# Patient Record
Sex: MALE | Race: WHITE | NOT HISPANIC OR LATINO | Employment: OTHER | URBAN - METROPOLITAN AREA
[De-identification: names, ages, dates, MRNs, and addresses within clinical notes are randomized per-mention and may not be internally consistent; named-entity substitution may affect disease eponyms.]

---

## 2023-10-30 DIAGNOSIS — I10 HYPERTENSION, UNSPECIFIED TYPE: ICD-10-CM

## 2023-10-30 DIAGNOSIS — I25.10 CORONARY ARTERY DISEASE INVOLVING NATIVE CORONARY ARTERY OF NATIVE HEART, UNSPECIFIED WHETHER ANGINA PRESENT: Primary | ICD-10-CM

## 2023-10-30 RX ORDER — METOPROLOL TARTRATE 25 MG/1
1 TABLET, FILM COATED ORAL 2 TIMES DAILY
COMMUNITY
Start: 2021-12-20 | End: 2023-10-30 | Stop reason: SDUPTHER

## 2023-10-30 RX ORDER — METOPROLOL TARTRATE 25 MG/1
25 TABLET, FILM COATED ORAL 2 TIMES DAILY
Qty: 180 TABLET | Refills: 1 | Status: SHIPPED | OUTPATIENT
Start: 2023-10-30 | End: 2024-04-19 | Stop reason: ALTCHOICE

## 2024-01-02 ENCOUNTER — APPOINTMENT (OUTPATIENT)
Dept: RADIOLOGY | Facility: CLINIC | Age: 69
End: 2024-01-02
Payer: MEDICARE

## 2024-01-15 ENCOUNTER — APPOINTMENT (OUTPATIENT)
Dept: RADIOLOGY | Facility: CLINIC | Age: 69
End: 2024-01-15
Payer: MEDICARE

## 2024-01-29 ENCOUNTER — APPOINTMENT (OUTPATIENT)
Dept: RADIOLOGY | Facility: CLINIC | Age: 69
End: 2024-01-29
Payer: MEDICARE

## 2024-02-16 ENCOUNTER — HOSPITAL ENCOUNTER (OUTPATIENT)
Dept: RADIOLOGY | Facility: CLINIC | Age: 69
End: 2024-02-16
Payer: MEDICARE

## 2024-02-24 RX ORDER — MELATON/THEAN/VAL/LEM/CHAM/LAV 10MG-200MG
1 TABLET,IMMED, EXTENDED RELEASE, BIPHASIC ORAL DAILY
COMMUNITY

## 2024-02-24 RX ORDER — ATORVASTATIN CALCIUM 40 MG/1
40 TABLET, FILM COATED ORAL
COMMUNITY
End: 2024-04-19 | Stop reason: WASHOUT

## 2024-02-24 RX ORDER — EZETIMIBE 10 MG/1
10 TABLET ORAL DAILY
COMMUNITY
Start: 2022-04-13

## 2024-02-24 RX ORDER — SEMAGLUTIDE 0.68 MG/ML
INJECTION, SOLUTION SUBCUTANEOUS
COMMUNITY
Start: 2023-05-18

## 2024-02-24 RX ORDER — MULTIVIT WITH MINERALS/HERBS
1 TABLET ORAL DAILY
COMMUNITY
Start: 2022-04-13

## 2024-02-24 RX ORDER — CETIRIZINE HYDROCHLORIDE 10 MG/1
10 TABLET ORAL DAILY
COMMUNITY

## 2024-02-24 RX ORDER — CLOPIDOGREL BISULFATE 75 MG/1
75 TABLET ORAL DAILY
COMMUNITY
End: 2024-03-13 | Stop reason: SDUPTHER

## 2024-02-24 RX ORDER — MULTIVIT-MIN/IRON/FOLIC ACID/K 18-600-40
CAPSULE ORAL
COMMUNITY

## 2024-02-24 RX ORDER — ISOSORBIDE MONONITRATE 30 MG/1
30 TABLET, EXTENDED RELEASE ORAL DAILY
COMMUNITY
End: 2024-03-13 | Stop reason: SDUPTHER

## 2024-02-24 RX ORDER — HUMAN INSULIN 100 [IU]/ML
INJECTION, SOLUTION SUBCUTANEOUS
COMMUNITY

## 2024-02-24 RX ORDER — NAPROXEN SODIUM 220 MG/1
1 TABLET, FILM COATED ORAL DAILY
COMMUNITY

## 2024-02-24 RX ORDER — UBIDECARENONE 75 MG
CAPSULE ORAL
COMMUNITY

## 2024-02-24 RX ORDER — INSULIN HUMAN 100 [IU]/ML
INJECTION, SUSPENSION SUBCUTANEOUS 2 TIMES DAILY
COMMUNITY
Start: 2019-06-27

## 2024-02-24 RX ORDER — SYRINGE-NEEDLE,INSULIN,0.5 ML 31 GX5/16"
SYRINGE, EMPTY DISPOSABLE MISCELLANEOUS
COMMUNITY
Start: 2023-10-30

## 2024-02-24 RX ORDER — MULTIVITAMIN
TABLET ORAL
COMMUNITY

## 2024-02-24 RX ORDER — BLOOD-GLUCOSE METER
EACH MISCELLANEOUS
COMMUNITY

## 2024-02-24 RX ORDER — QUINIDINE SULFATE 200 MG
TABLET ORAL
COMMUNITY

## 2024-02-24 RX ORDER — HUMAN INSULIN 100 [IU]/ML
INJECTION, SUSPENSION SUBCUTANEOUS
COMMUNITY

## 2024-02-24 RX ORDER — LOSARTAN POTASSIUM 50 MG/1
50 TABLET ORAL DAILY
COMMUNITY
Start: 2022-07-28

## 2024-02-28 ENCOUNTER — TELEPHONE (OUTPATIENT)
Dept: CARDIOLOGY | Facility: CLINIC | Age: 69
End: 2024-02-28
Payer: MEDICARE

## 2024-02-29 ENCOUNTER — APPOINTMENT (OUTPATIENT)
Dept: CARDIOLOGY | Facility: CLINIC | Age: 69
End: 2024-02-29
Payer: MEDICARE

## 2024-03-13 DIAGNOSIS — I25.10 CORONARY ARTERY DISEASE INVOLVING NATIVE CORONARY ARTERY OF NATIVE HEART, UNSPECIFIED WHETHER ANGINA PRESENT: Primary | ICD-10-CM

## 2024-03-13 RX ORDER — CLOPIDOGREL BISULFATE 75 MG/1
75 TABLET ORAL DAILY
Qty: 90 TABLET | Refills: 0 | Status: SHIPPED | OUTPATIENT
Start: 2024-03-13 | End: 2024-04-19 | Stop reason: SDUPTHER

## 2024-03-13 RX ORDER — ISOSORBIDE MONONITRATE 30 MG/1
30 TABLET, EXTENDED RELEASE ORAL DAILY
Qty: 90 TABLET | Refills: 0 | Status: SHIPPED | OUTPATIENT
Start: 2024-03-13 | End: 2024-04-19 | Stop reason: SDUPTHER

## 2024-03-13 NOTE — TELEPHONE ENCOUNTER
----- Message from Jose Alejandro Garcia sent at 3/13/2024 10:12 AM EDT -----  Regarding: Med refill  Patient would like isosorbide mononitrate ER (Imdur) 30 mg 24 hr tablet and clopidogrel (Plavix) 75 mg tablet to be sent to [facility] in [place]. He had an appt but was canceled and we got him rescheduled but he thinks these meds may not least until then. Thank you

## 2024-03-13 NOTE — TELEPHONE ENCOUNTER
Last Appointment: 2/28/23 with Dr Murdock  Next Appointment: 4/19/24 with Dr Murdock  Last Labs: Requested labs from PCP 11/2023  Last Refilled: 2/28/23 90 days 3 refills

## 2024-03-14 ENCOUNTER — APPOINTMENT (OUTPATIENT)
Dept: RADIOLOGY | Facility: HOSPITAL | Age: 69
End: 2024-03-14
Payer: MEDICARE

## 2024-03-14 ENCOUNTER — HOSPITAL ENCOUNTER (OUTPATIENT)
Dept: RADIOLOGY | Facility: HOSPITAL | Age: 69
Discharge: HOME | End: 2024-03-14
Payer: MEDICARE

## 2024-03-14 DIAGNOSIS — R54 AGE-RELATED PHYSICAL DEBILITY: ICD-10-CM

## 2024-03-14 PROCEDURE — 72125 CT NECK SPINE W/O DYE: CPT

## 2024-03-14 PROCEDURE — 72125 CT NECK SPINE W/O DYE: CPT | Performed by: STUDENT IN AN ORGANIZED HEALTH CARE EDUCATION/TRAINING PROGRAM

## 2024-03-24 PROBLEM — I25.10 CAD (CORONARY ARTERY DISEASE): Status: ACTIVE | Noted: 2024-03-24

## 2024-03-24 PROBLEM — R26.89 IMBALANCE: Status: ACTIVE | Noted: 2024-03-24

## 2024-03-24 PROBLEM — M79.89 PAIN AND SWELLING OF LOWER LEG, LEFT: Status: ACTIVE | Noted: 2024-03-24

## 2024-03-24 PROBLEM — R53.83 FATIGUE: Status: ACTIVE | Noted: 2024-03-24

## 2024-03-24 PROBLEM — R42 DIZZINESS: Status: ACTIVE | Noted: 2024-02-07

## 2024-03-24 PROBLEM — E78.5 HLD (HYPERLIPIDEMIA): Status: ACTIVE | Noted: 2024-03-24

## 2024-03-24 PROBLEM — I73.9 CLAUDICATION, INTERMITTENT (CMS-HCC): Status: ACTIVE | Noted: 2024-03-24

## 2024-03-24 PROBLEM — R79.89 ELEVATED SERUM CREATININE: Status: ACTIVE | Noted: 2024-03-24

## 2024-03-24 PROBLEM — G56.21 ULNAR NEUROPATHY AT ELBOW, RIGHT: Status: ACTIVE | Noted: 2024-03-24

## 2024-03-24 PROBLEM — N28.9 MILD RENAL INSUFFICIENCY: Status: ACTIVE | Noted: 2024-03-24

## 2024-03-24 PROBLEM — H93.13 BILATERAL TINNITUS: Status: ACTIVE | Noted: 2024-02-07

## 2024-03-24 PROBLEM — Z86.79 HISTORY OF HYPERTENSION: Status: ACTIVE | Noted: 2024-03-24

## 2024-03-24 PROBLEM — R29.898 RIGHT HAND WEAKNESS: Status: ACTIVE | Noted: 2024-03-24

## 2024-03-24 PROBLEM — Z86.39 HISTORY OF ELEVATED LIPIDS: Status: ACTIVE | Noted: 2024-03-24

## 2024-03-24 PROBLEM — G81.90 HEMIPLEGIA (MULTI): Status: ACTIVE | Noted: 2024-03-24

## 2024-03-24 PROBLEM — G47.33 OBSTRUCTIVE SLEEP APNEA SYNDROME: Status: ACTIVE | Noted: 2023-05-04

## 2024-03-24 PROBLEM — M47.812 SPONDYLOSIS OF CERVICAL SPINE: Status: ACTIVE | Noted: 2024-03-24

## 2024-03-24 PROBLEM — R42 CERVICAL VERTIGO: Status: ACTIVE | Noted: 2024-03-24

## 2024-03-24 PROBLEM — M47.22 OSTEOARTHRITIS OF SPINE WITH RADICULOPATHY, CERVICAL REGION: Status: ACTIVE | Noted: 2024-03-24

## 2024-03-24 PROBLEM — R94.39 ABNORMAL STRESS TEST: Status: ACTIVE | Noted: 2024-03-24

## 2024-03-24 PROBLEM — K14.0: Status: ACTIVE | Noted: 2024-02-07

## 2024-03-24 PROBLEM — M79.673 PAIN OF FOOT: Status: ACTIVE | Noted: 2024-03-24

## 2024-03-24 PROBLEM — G56.01 ACUTE CARPAL TUNNEL SYNDROME, RIGHT: Status: ACTIVE | Noted: 2024-03-24

## 2024-03-24 PROBLEM — J30.9 ALLERGIC RHINITIS: Status: ACTIVE | Noted: 2024-03-24

## 2024-03-24 PROBLEM — M25.562 LEFT KNEE PAIN: Status: ACTIVE | Noted: 2024-03-24

## 2024-03-24 PROBLEM — G81.94 LEFT HEMIPARESIS (MULTI): Status: ACTIVE | Noted: 2024-03-24

## 2024-03-24 PROBLEM — B35.4 TINEA CORPORIS: Status: ACTIVE | Noted: 2024-03-24

## 2024-03-24 PROBLEM — R20.2 RIGHT HAND PARESTHESIA: Status: ACTIVE | Noted: 2024-03-24

## 2024-03-24 PROBLEM — M79.662 PAIN AND SWELLING OF LOWER LEG, LEFT: Status: ACTIVE | Noted: 2024-03-24

## 2024-03-24 PROBLEM — R69 DISEASE SUSPECTED: Status: ACTIVE | Noted: 2024-03-24

## 2024-03-24 PROBLEM — R20.0 NUMBNESS: Status: ACTIVE | Noted: 2024-03-24

## 2024-03-24 PROBLEM — R05.3 CHRONIC COUGH: Status: ACTIVE | Noted: 2024-03-24

## 2024-03-24 PROBLEM — L85.3 DRY SKIN: Status: ACTIVE | Noted: 2024-03-24

## 2024-03-24 PROBLEM — Z20.822 CONTACT WITH AND (SUSPECTED) EXPOSURE TO COVID-19: Status: ACTIVE | Noted: 2022-05-20

## 2024-03-24 PROBLEM — E11.9 DIABETES MELLITUS (MULTI): Status: ACTIVE | Noted: 2024-03-24

## 2024-03-24 PROBLEM — E29.1 HYPOGONADISM IN MALE: Status: ACTIVE | Noted: 2024-03-24

## 2024-03-24 PROBLEM — R06.02 SHORTNESS OF BREATH: Status: ACTIVE | Noted: 2024-03-24

## 2024-03-24 PROBLEM — J44.9 MILD CHRONIC OBSTRUCTIVE PULMONARY DISEASE (MULTI): Status: ACTIVE | Noted: 2022-05-20

## 2024-03-24 PROBLEM — R94.31 ABNORMAL EKG: Status: ACTIVE | Noted: 2024-03-24

## 2024-03-24 PROBLEM — J44.9 COPD, MILD (MULTI): Status: ACTIVE | Noted: 2024-03-24

## 2024-03-24 PROBLEM — I63.9 STROKE (MULTI): Status: ACTIVE | Noted: 2024-03-24

## 2024-03-24 PROBLEM — J34.89 NASAL VALVE BLOCKAGE: Status: ACTIVE | Noted: 2024-03-24

## 2024-03-24 PROBLEM — G47.00 INSOMNIA: Status: ACTIVE | Noted: 2024-03-24

## 2024-03-24 PROBLEM — J01.90 ACUTE SINUSITIS: Status: ACTIVE | Noted: 2024-03-24

## 2024-03-24 PROBLEM — B35.1 ONYCHOMYCOSIS: Status: ACTIVE | Noted: 2024-03-24

## 2024-03-24 PROBLEM — E11.42 DIABETIC PERIPHERAL NEUROPATHY (MULTI): Status: ACTIVE | Noted: 2024-03-24

## 2024-03-24 PROBLEM — R79.89 LOW TESTOSTERONE IN MALE: Status: ACTIVE | Noted: 2024-03-24

## 2024-03-24 PROBLEM — E87.5 HYPERKALEMIA: Status: ACTIVE | Noted: 2024-03-24

## 2024-03-24 PROBLEM — I10 BENIGN ESSENTIAL HYPERTENSION: Status: ACTIVE | Noted: 2022-05-20

## 2024-03-24 RX ORDER — AMLODIPINE BESYLATE 5 MG/1
5 TABLET ORAL
COMMUNITY
Start: 2019-05-06 | End: 2024-04-19 | Stop reason: WASHOUT

## 2024-03-24 RX ORDER — CALCIUM CARBONATE 260MG(650)
TABLET,CHEWABLE ORAL
COMMUNITY

## 2024-03-24 RX ORDER — LISINOPRIL 20 MG/1
20 TABLET ORAL
COMMUNITY
Start: 2014-10-24 | End: 2024-04-19 | Stop reason: SINTOL

## 2024-03-24 RX ORDER — INSULIN LISPRO 100 [IU]/ML
INJECTION, SOLUTION INTRAVENOUS; SUBCUTANEOUS
COMMUNITY
Start: 2015-06-16

## 2024-03-24 RX ORDER — ZOLPIDEM TARTRATE 5 MG/1
TABLET ORAL
COMMUNITY
Start: 2023-05-04

## 2024-03-24 RX ORDER — TURMERIC ROOT EXTRACT 500 MG
TABLET ORAL
COMMUNITY

## 2024-03-24 RX ORDER — ACETAMINOPHEN 500 MG
TABLET ORAL
COMMUNITY

## 2024-03-24 RX ORDER — PSEUDOEPHEDRINE HCL 120 MG
TABLET, EXTENDED RELEASE ORAL
COMMUNITY

## 2024-03-24 RX ORDER — INSULIN GLARGINE 100 [IU]/ML
INJECTION, SOLUTION SUBCUTANEOUS
COMMUNITY
Start: 2017-10-09

## 2024-03-24 RX ORDER — BLOOD SUGAR DIAGNOSTIC, DISC
STRIP MISCELLANEOUS
COMMUNITY
Start: 2014-10-24

## 2024-03-24 RX ORDER — TESTOSTERONE CYPIONATE 1000 MG/10ML
INJECTION, SOLUTION INTRAMUSCULAR
COMMUNITY
Start: 2019-01-18

## 2024-03-24 RX ORDER — TIOTROPIUM BROMIDE AND OLODATEROL 3.124; 2.736 UG/1; UG/1
SPRAY, METERED RESPIRATORY (INHALATION)
COMMUNITY
Start: 2024-02-13

## 2024-03-24 RX ORDER — GUAIFENESIN 400 MG/1
400 TABLET ORAL
COMMUNITY
Start: 2019-05-09

## 2024-03-24 RX ORDER — NYSTATIN 100000 [USP'U]/ML
SUSPENSION ORAL
COMMUNITY

## 2024-04-19 ENCOUNTER — OFFICE VISIT (OUTPATIENT)
Dept: CARDIOLOGY | Facility: CLINIC | Age: 69
End: 2024-04-19
Payer: MEDICARE

## 2024-04-19 VITALS
HEART RATE: 59 BPM | SYSTOLIC BLOOD PRESSURE: 126 MMHG | HEIGHT: 72 IN | WEIGHT: 212 LBS | BODY MASS INDEX: 28.71 KG/M2 | DIASTOLIC BLOOD PRESSURE: 70 MMHG

## 2024-04-19 DIAGNOSIS — I25.10 CORONARY ARTERY DISEASE INVOLVING NATIVE CORONARY ARTERY OF NATIVE HEART, UNSPECIFIED WHETHER ANGINA PRESENT: ICD-10-CM

## 2024-04-19 DIAGNOSIS — R94.31 ABNORMAL EKG: Primary | ICD-10-CM

## 2024-04-19 PROCEDURE — 4010F ACE/ARB THERAPY RXD/TAKEN: CPT | Performed by: INTERNAL MEDICINE

## 2024-04-19 PROCEDURE — 1159F MED LIST DOCD IN RCRD: CPT | Performed by: INTERNAL MEDICINE

## 2024-04-19 PROCEDURE — 3078F DIAST BP <80 MM HG: CPT | Performed by: INTERNAL MEDICINE

## 2024-04-19 PROCEDURE — 93000 ELECTROCARDIOGRAM COMPLETE: CPT | Performed by: INTERNAL MEDICINE

## 2024-04-19 PROCEDURE — 1157F ADVNC CARE PLAN IN RCRD: CPT | Performed by: INTERNAL MEDICINE

## 2024-04-19 PROCEDURE — 3074F SYST BP LT 130 MM HG: CPT | Performed by: INTERNAL MEDICINE

## 2024-04-19 PROCEDURE — 99213 OFFICE O/P EST LOW 20 MIN: CPT | Performed by: INTERNAL MEDICINE

## 2024-04-19 PROCEDURE — 1036F TOBACCO NON-USER: CPT | Performed by: INTERNAL MEDICINE

## 2024-04-19 PROCEDURE — 1160F RVW MEDS BY RX/DR IN RCRD: CPT | Performed by: INTERNAL MEDICINE

## 2024-04-19 RX ORDER — CLOPIDOGREL BISULFATE 75 MG/1
75 TABLET ORAL DAILY
Qty: 90 TABLET | Refills: 3 | Status: SHIPPED | OUTPATIENT
Start: 2024-04-19 | End: 2025-04-19

## 2024-04-19 RX ORDER — ISOSORBIDE MONONITRATE 30 MG/1
30 TABLET, EXTENDED RELEASE ORAL DAILY
Qty: 90 TABLET | Refills: 3 | Status: SHIPPED | OUTPATIENT
Start: 2024-04-19 | End: 2025-04-19

## 2024-04-19 ASSESSMENT — ENCOUNTER SYMPTOMS
OCCASIONAL FEELINGS OF UNSTEADINESS: 1
LOSS OF SENSATION IN FEET: 0
DEPRESSION: 0

## 2024-04-19 NOTE — PROGRESS NOTES
Counseling:  The patient was counseled regarding diagnostic results, instructions for management, risk factor reductions, prognosis, patient and family education, impressions, risks and benefits of treatment options and importance of compliance with treatment.      Chief Complaint:   The patient presents today for annual followup of CAD.     History Of Present Illness:    Jose Olivia is a 68 year old male patient who presents today for annual followup of CAD. His PMH is significant for HTN, CAD, mild COPD, DM with diabetic peripheral neuropathy, hyperlipidemia, mild renal insufficiency and CVA 2018 (on aspirin and Plavix since that time). Over the past year, the patient states that he has done well from a cardiac standpoint. He denies any CP, chest discomfort or SOB. He reports persistent fatigue, which has improved since discontinuing his a.m. dose of metoprolol. Patient is bradycardic today at 59 bpm, and the patient states that his HR is consistently low. BP has been stable. EKG today shows NSR with no acute changes.      Last Recorded Vitals:  Vitals:    04/19/24 1506   BP: 126/70   BP Location: Left arm   Pulse: 59   Weight: 96.2 kg (212 lb)   Height: 1.829 m (6')       Past Surgical History:  He has a past surgical history that includes Prostate surgery (04/05/2016); Knee surgery (04/05/2016); Tonsillectomy (04/05/2016); MR angio neck wo IV contrast (5/18/2022); and MR angio head wo IV contrast (5/18/2022).      Social History:  He reports that he has quit smoking. His smoking use included cigarettes. He has never used smokeless tobacco. No history on file for alcohol use and drug use.    Family History:  No family history on file.     Allergies:  Metformin and Lactate    Outpatient Medications:  Current Outpatient Medications   Medication Instructions    amLODIPine (NORVASC) 5 mg, oral, Daily RT    ascorbic acid, vitamin C, 500 mg capsule oral    aspirin 81 mg chewable tablet 1 tablet, oral, Daily     "atorvastatin (LIPITOR) 40 mg, oral    blood sugar diagnostic, disc (Breeze 2 Test Strips) strip Dilma Breeze 2 Test DISK Quantity: 400 Refills: 0 Start : 24-Oct-2014 Active    cetirizine (ZYRTEC) 10 mg, oral, Daily    cholecalciferol (Vitamin D-3) 50 mcg (2,000 unit) capsule oral    chromium amino acid chelate 400 mcg tablet oral    clopidogrel (PLAVIX) 75 mg, oral, Daily    cyanocobalamin (Vitamin B-12) 500 mcg tablet oral    Easy Touch Insulin Syringe 0.5 mL 31 gauge x 5/16\" syringe     ezetimibe (ZETIA) 10 mg, oral, Daily    folic acid/vit B complex and C (B complex-vitamin C-folic acid) 400 mcg tablet extended release 1 tablet, oral, Daily    glycopyrrolate-formoteroL 9-4.8 mcg HFA aerosol inhaler inhalation, Every 12 hours    guaiFENesin (HUMIBID 3) 400 mg, oral    insulin glargine (Lantus) 100 unit/mL (3 mL) pen subcutaneous    insulin lispro (HumaLOG) 100 unit/mL injection subcutaneous    insulin NPH and regular human (HumuLIN 70/30 U-100 Insulin) 100 unit/mL (70-30) injection subcutaneous, 2 times daily    insulin NPH, Isophane, (NovoLIN N NPH U-100 Insulin) 100 unit/mL injection subcutaneous    insulin regular (NovoLIN R Regular U100 Insulin) 100 unit/mL injection injectable 3 times a day (before meals)Sliding scale coverage    isosorbide mononitrate ER (IMDUR) 30 mg, oral, Daily    KRILL OIL ORAL oral    lisinopril 20 mg, oral, Daily RT    losartan (COZAAR) 50 mg, oral, Daily    magnesium oxide,aspartate,citr (Triple Magnesium Complex) 400 mg magnesium capsule oral    metoprolol tartrate (LOPRESSOR) 25 mg, oral, 2 times daily    multivitamin tablet oral    mv-mn-iron-FA-herbal cmplx#190 (Vitamin D3 Complete) 18 mg iron-800 mcg-150 mg tablet oral    nystatin (Mycostatin) 100,000 unit/mL suspension     OneTouch Verio test strips strip use to take blood sugars at least 3-4 times per day    Ozempic 0.25 mg or 0.5 mg (2 mg/3 mL) pen injector     SACCHAROMYCES BOULARDII ORAL oral    SITagliptin phosphate " (Januvia) 100 mg tablet oral, Daily RT    Stiolto Respimat 2.5-2.5 mcg/actuation mist inhaler 2 puffs inhaled once a day    testosterone cypionate (Depo-Testosterone) 100 mg/mL injection intramuscular    turmeric root extract 500 mg tablet oral    vitamin B complex tablet 1 tablet, oral, Daily    zolpidem (Ambien) 5 mg tablet      Review of Systems   All other systems reviewed and are negative.     Physical Exam:  Constitutional:       Appearance: Healthy appearance. Not in distress.   Neck:      Vascular: No JVR. JVD normal.   Pulmonary:      Effort: Pulmonary effort is normal.      Breath sounds: Normal breath sounds. No wheezing. No rhonchi. No rales.   Chest:      Chest wall: Not tender to palpatation.   Cardiovascular:      PMI at left midclavicular line. Normal rate. Regular rhythm. Normal S1. Normal S2.       Murmurs: There is no murmur.      No gallop.  No click. No rub.   Pulses:     Intact distal pulses.   Edema:     Peripheral edema absent.   Abdominal:      General: Bowel sounds are normal.      Palpations: Abdomen is soft.      Tenderness: There is no abdominal tenderness.   Musculoskeletal: Normal range of motion.         General: No tenderness. Skin:     General: Skin is warm and dry.   Neurological:      General: No focal deficit present.      Mental Status: Alert and oriented to person, place and time.          Last Labs:  CBC -  Lab Results   Component Value Date    WBC 10.9 05/20/2022    HGB 15.6 05/20/2022    HCT 44.3 05/20/2022    MCV 95 05/20/2022     05/20/2022       CMP -  Lab Results   Component Value Date    CALCIUM 8.6 05/20/2022    PHOS 4.1 11/16/2019    PROT 6.4 05/18/2022    ALBUMIN 3.7 05/18/2022    AST 23 05/18/2022    ALT 29 05/18/2022    ALKPHOS 73 05/18/2022    BILITOT 0.5 05/18/2022       LIPID PANEL -   Lab Results   Component Value Date    CHOL 150 05/19/2022    TRIG 204 (H) 05/19/2022    HDL 40.1 05/19/2022    CHHDL 3.7 05/19/2022    LDLF 69 05/19/2022    VLDL 41 (H)  05/19/2022    NHDL 110 05/19/2022       RENAL FUNCTION PANEL -   Lab Results   Component Value Date    GLUCOSE 95 05/20/2022     05/20/2022    K 3.8 05/20/2022     (H) 05/20/2022    CO2 24 05/20/2022    ANIONGAP 10 05/20/2022    BUN 27 (H) 05/20/2022    CREATININE 1.28 05/20/2022    GFRMALE 61 05/20/2022    CALCIUM 8.6 05/20/2022    PHOS 4.1 11/16/2019    ALBUMIN 3.7 05/18/2022        Lab Results   Component Value Date     (H) 05/19/2022    HGBA1C 6.5 (A) 05/19/2022       Last Cardiology Tests:  05/20/2022 - TTE  1. The left ventricular systolic function is normal with a 55-60% estimated ejection fraction.  2. Spectral Doppler shows a pseudonormal pattern of left ventricular diastolic filling.  3. Aortic valve stenosis is not present.     12/23/2021 - Vascular Lab PVR ANGLE Only   1. Bilateral Lower PVR: No evidence of arterial occlusive disease bilaterally in the lower extremities at rest.  2. Right Lower PVR: Normal digital perfusion noted. Biphasic flow is noted in the right posterior tibial artery and right dorsalis pedis artery. Triphasic flow is noted in the right common femoral artery.  3. Left Lower PVR: Normal digital perfusion noted. Biphasic flow is noted in the left posterior tibial artery. Triphasic flow is noted in the left common femoral artery and left dorsalis pedis artery.     12/20/2021 - Cardiac Catheterization (LH)  1. Double vessel coronary artery disease without proximal left anterior descending involvement.  2. Left Ventricular end-diastolic pressure = 9.  3. Normal LV filling pressures.     11/23/2021 - Vascular Lab Carotid Artery Duplex U/S   1. Right Carotid: Findings are consistent with less than 50% stenosis of the right proximal ICA. Laminar flow seen by color Doppler. Right external carotid artery appears patent with no evidence of stenosis. No evidence of hemodynamically significant stenosis of the right common carotid artery. The right vertebral artery is patent with  antegrade flow. No evidence of hemodynamically significant stenosis in the right subclavian.  2. Left Carotid: Findings are consistent with less than 50% stenosis of the left proximal ICA. Laminar flow seen by color Doppler. Left external carotid artery appears patent with no evidence of stenosis. No evidence of hemodynamically significant stenosis of the left common carotid artery. The left vertebral artery is patent with antegrade flow. No evidence of hemodynamically significant stenosis in the left subclavian.     11/04/2021 - TTE  1. The left ventricular systolic function is normal with a 60-65% estimated ejection fraction.  2. There is moderate concentric left ventricular hypertrophy.     11/04/2021 - Exercise Stress Test  1. Stress test was indeterminate / nondiagnostic due to baseline EKG abnormalities.  2. Consideration for alternative testing (stress test with imaging component, CTA coronaries, or coronary angiography) can be considered if clinically indicated.  3. Dr. Treadwell's office was made aware of the result and recommendations as outlined above.  4. Hypertensive blood pressure response to exercise.  5. Indeterminate Stress Test.  6. The adequate level of stress was achieved.     06/10/2019 - PFTs  Minimal airway obstruction is present.     Lab review: I have personally reviewed the laboratory result(s).    Assessment/Plan   1) Moderate Diffuse CAD of RCA and LAD  On ASA 81 mg daily, Plavix 75 mg daily, Zetia 10 mg daily, Imdur 30 mg daily, metoprolol tartrate 25 mg BID  If recurrent symptoms on medical Rx, then PCI of RCA  Lipid panel 11/21/2023 with LDL of 43  Denies CP, chest discomfort or SOB  Reports persistent fatigue which has improved since discontinuing his a.m. dose of metoprolol  Patient has been consistently bradycardic   BP stable  Wheezing heard on exam today - per patient, recent CXR was negative, advised to f/u with PCP    Discontinue metoprolol   Home monitoring of BP  F/U 1 year      2) Claudication Symptoms  Negative LE ANGLE in the past     3) Fatigue/Tiredness, Decreased Energy, Poor Sleep Quality  Pt reports these symptoms have been present x10 years  Inconclusive in-lab sleep study in past d/t inability to fall asleep in lab setting  Previously referred to Dr. Castro for assessment of sleep apnea  Reports persistent fatigue which has improved since discontinuing his a.m. dose of metoprolol  Will discontinue metoprolol        Scribe Attestation  By signing my name below, I, Reena Macias   attest that this documentation has been prepared under the direction and in the presence of Diogo Murdock MD.

## 2024-04-19 NOTE — PATIENT INSTRUCTIONS
Discontinue metoprolol.  Continue all other medications as prescribed.   Continue to monitor your blood pressure at home, especially since we are discontinuing your metoprolol.   As Dr. Murdock heard some wheezing in your lungs today, he has recommended that you followup with your primary care provider to make sure that your recent chest x-ray was normal.   Followup with Dr. Murdock in 1 year, sooner should any issues or concerns arise before then.     If you have any questions or cardiac concerns, please call our office at [phone].

## 2024-07-07 ENCOUNTER — HOSPITAL ENCOUNTER (INPATIENT)
Facility: HOSPITAL | Age: 69
DRG: 397 | End: 2024-07-07
Attending: EMERGENCY MEDICINE | Admitting: INTERNAL MEDICINE
Payer: MEDICARE

## 2024-07-07 ENCOUNTER — ANESTHESIA EVENT (OUTPATIENT)
Dept: OPERATING ROOM | Facility: HOSPITAL | Age: 69
End: 2024-07-07
Payer: MEDICARE

## 2024-07-07 ENCOUNTER — APPOINTMENT (OUTPATIENT)
Dept: CARDIOLOGY | Facility: HOSPITAL | Age: 69
DRG: 397 | End: 2024-07-07
Payer: MEDICARE

## 2024-07-07 ENCOUNTER — APPOINTMENT (OUTPATIENT)
Dept: RADIOLOGY | Facility: HOSPITAL | Age: 69
DRG: 397 | End: 2024-07-07
Payer: MEDICARE

## 2024-07-07 ENCOUNTER — ANESTHESIA (OUTPATIENT)
Dept: OPERATING ROOM | Facility: HOSPITAL | Age: 69
End: 2024-07-07
Payer: MEDICARE

## 2024-07-07 VITALS
TEMPERATURE: 98.2 F | DIASTOLIC BLOOD PRESSURE: 86 MMHG | HEART RATE: 73 BPM | HEIGHT: 72 IN | WEIGHT: 210.54 LBS | BODY MASS INDEX: 28.52 KG/M2 | RESPIRATION RATE: 18 BRPM | SYSTOLIC BLOOD PRESSURE: 149 MMHG | OXYGEN SATURATION: 94 %

## 2024-07-07 DIAGNOSIS — I48.91 UNSPECIFIED ATRIAL FIBRILLATION (MULTI): ICD-10-CM

## 2024-07-07 DIAGNOSIS — K35.30 ACUTE APPENDICITIS WITH LOCALIZED PERITONITIS, WITHOUT PERFORATION, ABSCESS, OR GANGRENE: Primary | ICD-10-CM

## 2024-07-07 DIAGNOSIS — I48.91 ATRIAL FIBRILLATION, UNSPECIFIED TYPE (MULTI): ICD-10-CM

## 2024-07-07 DIAGNOSIS — R94.31 ABNORMAL EKG: ICD-10-CM

## 2024-07-07 DIAGNOSIS — R06.02 SHORTNESS OF BREATH: ICD-10-CM

## 2024-07-07 LAB
ABO GROUP (TYPE) IN BLOOD: NORMAL
ABO GROUP (TYPE) IN BLOOD: NORMAL
ALBUMIN SERPL BCP-MCNC: 3.8 G/DL (ref 3.4–5)
ALP SERPL-CCNC: 68 U/L (ref 33–136)
ALT SERPL W P-5'-P-CCNC: 19 U/L (ref 10–52)
ANION GAP SERPL CALC-SCNC: 12 MMOL/L (ref 10–20)
ANTIBODY SCREEN: NORMAL
AST SERPL W P-5'-P-CCNC: 14 U/L (ref 9–39)
BASOPHILS # BLD AUTO: 0.04 X10*3/UL (ref 0–0.1)
BASOPHILS NFR BLD AUTO: 0.2 %
BILIRUB SERPL-MCNC: 1.3 MG/DL (ref 0–1.2)
BLOOD EXPIRATION DATE: NORMAL
BLOOD EXPIRATION DATE: NORMAL
BUN SERPL-MCNC: 26 MG/DL (ref 6–23)
CALCIUM SERPL-MCNC: 9.4 MG/DL (ref 8.6–10.3)
CHLORIDE SERPL-SCNC: 107 MMOL/L (ref 98–107)
CO2 SERPL-SCNC: 23 MMOL/L (ref 21–32)
CREAT SERPL-MCNC: 1.67 MG/DL (ref 0.5–1.3)
DISPENSE STATUS: NORMAL
DISPENSE STATUS: NORMAL
EGFRCR SERPLBLD CKD-EPI 2021: 44 ML/MIN/1.73M*2
EOSINOPHIL # BLD AUTO: 0.01 X10*3/UL (ref 0–0.7)
EOSINOPHIL NFR BLD AUTO: 0 %
ERYTHROCYTE [DISTWIDTH] IN BLOOD BY AUTOMATED COUNT: 12.7 % (ref 11.5–14.5)
GLUCOSE BLD MANUAL STRIP-MCNC: 156 MG/DL (ref 74–99)
GLUCOSE BLD MANUAL STRIP-MCNC: 177 MG/DL (ref 74–99)
GLUCOSE BLD MANUAL STRIP-MCNC: 196 MG/DL (ref 74–99)
GLUCOSE BLD MANUAL STRIP-MCNC: 207 MG/DL (ref 74–99)
GLUCOSE SERPL-MCNC: 166 MG/DL (ref 74–99)
HCT VFR BLD AUTO: 46.4 % (ref 41–52)
HGB BLD-MCNC: 16.4 G/DL (ref 13.5–17.5)
IMM GRANULOCYTES # BLD AUTO: 0.13 X10*3/UL (ref 0–0.7)
IMM GRANULOCYTES NFR BLD AUTO: 0.6 % (ref 0–0.9)
LACTATE SERPL-SCNC: 1.6 MMOL/L (ref 0.4–2)
LIPASE SERPL-CCNC: 13 U/L (ref 9–82)
LYMPHOCYTES # BLD AUTO: 0.28 X10*3/UL (ref 1.2–4.8)
LYMPHOCYTES NFR BLD AUTO: 1.3 %
MAGNESIUM SERPL-MCNC: 1.5 MG/DL (ref 1.6–2.4)
MCH RBC QN AUTO: 34 PG (ref 26–34)
MCHC RBC AUTO-ENTMCNC: 35.3 G/DL (ref 32–36)
MCV RBC AUTO: 96 FL (ref 80–100)
MONOCYTES # BLD AUTO: 0.59 X10*3/UL (ref 0.1–1)
MONOCYTES NFR BLD AUTO: 2.8 %
NEUTROPHILS # BLD AUTO: 20.28 X10*3/UL (ref 1.2–7.7)
NEUTROPHILS NFR BLD AUTO: 95.1 %
NRBC BLD-RTO: 0 /100 WBCS (ref 0–0)
PLATELET # BLD AUTO: 219 X10*3/UL (ref 150–450)
POTASSIUM SERPL-SCNC: 4.1 MMOL/L (ref 3.5–5.3)
PRODUCT BLOOD TYPE: 2800
PRODUCT BLOOD TYPE: 2800
PRODUCT CODE: NORMAL
PRODUCT CODE: NORMAL
PROT SERPL-MCNC: 6.4 G/DL (ref 6.4–8.2)
RBC # BLD AUTO: 4.82 X10*6/UL (ref 4.5–5.9)
RH FACTOR (ANTIGEN D): NORMAL
RH FACTOR (ANTIGEN D): NORMAL
SODIUM SERPL-SCNC: 138 MMOL/L (ref 136–145)
UNIT ABO: NORMAL
UNIT ABO: NORMAL
UNIT NUMBER: NORMAL
UNIT NUMBER: NORMAL
UNIT RH: NORMAL
UNIT RH: NORMAL
UNIT VOLUME: 324
UNIT VOLUME: 325
WBC # BLD AUTO: 21.3 X10*3/UL (ref 4.4–11.3)

## 2024-07-07 PROCEDURE — 99285 EMERGENCY DEPT VISIT HI MDM: CPT | Mod: 25

## 2024-07-07 PROCEDURE — 83735 ASSAY OF MAGNESIUM: CPT | Performed by: EMERGENCY MEDICINE

## 2024-07-07 PROCEDURE — 2500000005 HC RX 250 GENERAL PHARMACY W/O HCPCS: Performed by: SURGERY

## 2024-07-07 PROCEDURE — 3600000004 HC OR TIME - INITIAL BASE CHARGE - PROCEDURE LEVEL FOUR: Performed by: SURGERY

## 2024-07-07 PROCEDURE — 2720000007 HC OR 272 NO HCPCS: Performed by: SURGERY

## 2024-07-07 PROCEDURE — 93005 ELECTROCARDIOGRAM TRACING: CPT

## 2024-07-07 PROCEDURE — 99222 1ST HOSP IP/OBS MODERATE 55: CPT | Performed by: INTERNAL MEDICINE

## 2024-07-07 PROCEDURE — 86901 BLOOD TYPING SEROLOGIC RH(D): CPT | Performed by: STUDENT IN AN ORGANIZED HEALTH CARE EDUCATION/TRAINING PROGRAM

## 2024-07-07 PROCEDURE — 2500000004 HC RX 250 GENERAL PHARMACY W/ HCPCS (ALT 636 FOR OP/ED): Performed by: INTERNAL MEDICINE

## 2024-07-07 PROCEDURE — 83690 ASSAY OF LIPASE: CPT | Performed by: EMERGENCY MEDICINE

## 2024-07-07 PROCEDURE — 85025 COMPLETE CBC W/AUTO DIFF WBC: CPT | Performed by: EMERGENCY MEDICINE

## 2024-07-07 PROCEDURE — 7100000001 HC RECOVERY ROOM TIME - INITIAL BASE CHARGE: Performed by: SURGERY

## 2024-07-07 PROCEDURE — 96375 TX/PRO/DX INJ NEW DRUG ADDON: CPT

## 2024-07-07 PROCEDURE — 2500000004 HC RX 250 GENERAL PHARMACY W/ HCPCS (ALT 636 FOR OP/ED): Performed by: SURGERY

## 2024-07-07 PROCEDURE — 2500000004 HC RX 250 GENERAL PHARMACY W/ HCPCS (ALT 636 FOR OP/ED): Performed by: STUDENT IN AN ORGANIZED HEALTH CARE EDUCATION/TRAINING PROGRAM

## 2024-07-07 PROCEDURE — 0752T DGTZ GLS MCRSCP SLD LVL III: CPT | Mod: TC,PORLAB | Performed by: EMERGENCY MEDICINE

## 2024-07-07 PROCEDURE — 2060000001 HC INTERMEDIATE ICU ROOM DAILY

## 2024-07-07 PROCEDURE — 7100000002 HC RECOVERY ROOM TIME - EACH INCREMENTAL 1 MINUTE: Performed by: SURGERY

## 2024-07-07 PROCEDURE — 36430 TRANSFUSION BLD/BLD COMPNT: CPT

## 2024-07-07 PROCEDURE — 0DTJ4ZZ RESECTION OF APPENDIX, PERCUTANEOUS ENDOSCOPIC APPROACH: ICD-10-PCS | Performed by: SURGERY

## 2024-07-07 PROCEDURE — 88304 TISSUE EXAM BY PATHOLOGIST: CPT | Performed by: PATHOLOGY

## 2024-07-07 PROCEDURE — P9035 PLATELET PHERES LEUKOREDUCED: HCPCS

## 2024-07-07 PROCEDURE — 83036 HEMOGLOBIN GLYCOSYLATED A1C: CPT | Performed by: INTERNAL MEDICINE

## 2024-07-07 PROCEDURE — 83605 ASSAY OF LACTIC ACID: CPT | Performed by: EMERGENCY MEDICINE

## 2024-07-07 PROCEDURE — 2780000003 HC OR 278 NO HCPCS: Performed by: SURGERY

## 2024-07-07 PROCEDURE — 74177 CT ABD & PELVIS W/CONTRAST: CPT

## 2024-07-07 PROCEDURE — 3600000009 HC OR TIME - EACH INCREMENTAL 1 MINUTE - PROCEDURE LEVEL FOUR: Performed by: SURGERY

## 2024-07-07 PROCEDURE — 84295 ASSAY OF SERUM SODIUM: CPT | Performed by: EMERGENCY MEDICINE

## 2024-07-07 PROCEDURE — 36415 COLL VENOUS BLD VENIPUNCTURE: CPT | Performed by: EMERGENCY MEDICINE

## 2024-07-07 PROCEDURE — 2500000004 HC RX 250 GENERAL PHARMACY W/ HCPCS (ALT 636 FOR OP/ED): Performed by: NURSE ANESTHETIST, CERTIFIED REGISTERED

## 2024-07-07 PROCEDURE — 0WQF4ZZ REPAIR ABDOMINAL WALL, PERCUTANEOUS ENDOSCOPIC APPROACH: ICD-10-PCS | Performed by: SURGERY

## 2024-07-07 PROCEDURE — 2500000004 HC RX 250 GENERAL PHARMACY W/ HCPCS (ALT 636 FOR OP/ED): Performed by: ANESTHESIOLOGY

## 2024-07-07 PROCEDURE — 2500000005 HC RX 250 GENERAL PHARMACY W/O HCPCS: Performed by: NURSE ANESTHETIST, CERTIFIED REGISTERED

## 2024-07-07 PROCEDURE — 3700000001 HC GENERAL ANESTHESIA TIME - INITIAL BASE CHARGE: Performed by: SURGERY

## 2024-07-07 PROCEDURE — 3700000002 HC GENERAL ANESTHESIA TIME - EACH INCREMENTAL 1 MINUTE: Performed by: SURGERY

## 2024-07-07 PROCEDURE — 87185 SC STD ENZYME DETCJ PER NZM: CPT | Mod: PORLAB | Performed by: SURGERY

## 2024-07-07 PROCEDURE — 36415 COLL VENOUS BLD VENIPUNCTURE: CPT | Performed by: STUDENT IN AN ORGANIZED HEALTH CARE EDUCATION/TRAINING PROGRAM

## 2024-07-07 PROCEDURE — 2500000004 HC RX 250 GENERAL PHARMACY W/ HCPCS (ALT 636 FOR OP/ED): Performed by: EMERGENCY MEDICINE

## 2024-07-07 PROCEDURE — 96365 THER/PROPH/DIAG IV INF INIT: CPT

## 2024-07-07 PROCEDURE — 82947 ASSAY GLUCOSE BLOOD QUANT: CPT

## 2024-07-07 PROCEDURE — 2550000001 HC RX 255 CONTRASTS: Performed by: EMERGENCY MEDICINE

## 2024-07-07 RX ORDER — ACETAMINOPHEN 325 MG/1
650 TABLET ORAL EVERY 4 HOURS PRN
Status: DISCONTINUED | OUTPATIENT
Start: 2024-07-07 | End: 2024-07-15 | Stop reason: HOSPADM

## 2024-07-07 RX ORDER — PROPOFOL 10 MG/ML
INJECTION, EMULSION INTRAVENOUS AS NEEDED
Status: DISCONTINUED | OUTPATIENT
Start: 2024-07-07 | End: 2024-07-07

## 2024-07-07 RX ORDER — INSULIN LISPRO 100 [IU]/ML
0-10 INJECTION, SOLUTION INTRAVENOUS; SUBCUTANEOUS
Status: DISCONTINUED | OUTPATIENT
Start: 2024-07-07 | End: 2024-07-15 | Stop reason: HOSPADM

## 2024-07-07 RX ORDER — CLOPIDOGREL BISULFATE 75 MG/1
75 TABLET ORAL DAILY
Status: DISCONTINUED | OUTPATIENT
Start: 2024-07-08 | End: 2024-07-15 | Stop reason: HOSPADM

## 2024-07-07 RX ORDER — AMOXICILLIN AND CLAVULANATE POTASSIUM 875; 125 MG/1; MG/1
1 TABLET, FILM COATED ORAL EVERY 12 HOURS
Qty: 20 TABLET | Refills: 0 | Status: SHIPPED | OUTPATIENT
Start: 2024-07-07 | End: 2024-07-25 | Stop reason: WASHOUT

## 2024-07-07 RX ORDER — LIDOCAINE HCL/PF 100 MG/5ML
SYRINGE (ML) INTRAVENOUS AS NEEDED
Status: DISCONTINUED | OUTPATIENT
Start: 2024-07-07 | End: 2024-07-07

## 2024-07-07 RX ORDER — ONDANSETRON HYDROCHLORIDE 2 MG/ML
4 INJECTION, SOLUTION INTRAVENOUS ONCE AS NEEDED
Status: DISCONTINUED | OUTPATIENT
Start: 2024-07-07 | End: 2024-07-07 | Stop reason: HOSPADM

## 2024-07-07 RX ORDER — SODIUM CHLORIDE, SODIUM LACTATE, POTASSIUM CHLORIDE, CALCIUM CHLORIDE 600; 310; 30; 20 MG/100ML; MG/100ML; MG/100ML; MG/100ML
100 INJECTION, SOLUTION INTRAVENOUS CONTINUOUS
Status: DISCONTINUED | OUTPATIENT
Start: 2024-07-07 | End: 2024-07-07 | Stop reason: HOSPADM

## 2024-07-07 RX ORDER — ISOSORBIDE MONONITRATE 30 MG/1
30 TABLET, EXTENDED RELEASE ORAL DAILY
Status: DISCONTINUED | OUTPATIENT
Start: 2024-07-07 | End: 2024-07-15 | Stop reason: HOSPADM

## 2024-07-07 RX ORDER — FENTANYL CITRATE 50 UG/ML
INJECTION, SOLUTION INTRAMUSCULAR; INTRAVENOUS AS NEEDED
Status: DISCONTINUED | OUTPATIENT
Start: 2024-07-07 | End: 2024-07-07

## 2024-07-07 RX ORDER — MAGNESIUM GLYCINATE 100 MG
255 CAPSULE ORAL DAILY
COMMUNITY
End: 2024-07-25 | Stop reason: WASHOUT

## 2024-07-07 RX ORDER — SODIUM CHLORIDE 0.9 G/100ML
IRRIGANT IRRIGATION AS NEEDED
Status: DISCONTINUED | OUTPATIENT
Start: 2024-07-07 | End: 2024-07-07 | Stop reason: HOSPADM

## 2024-07-07 RX ORDER — DIPHENHYDRAMINE HYDROCHLORIDE 50 MG/ML
12.5 INJECTION INTRAMUSCULAR; INTRAVENOUS ONCE AS NEEDED
Status: DISCONTINUED | OUTPATIENT
Start: 2024-07-07 | End: 2024-07-07 | Stop reason: HOSPADM

## 2024-07-07 RX ORDER — DROPERIDOL 2.5 MG/ML
0.62 INJECTION, SOLUTION INTRAMUSCULAR; INTRAVENOUS ONCE AS NEEDED
Status: DISCONTINUED | OUTPATIENT
Start: 2024-07-07 | End: 2024-07-07 | Stop reason: HOSPADM

## 2024-07-07 RX ORDER — METRONIDAZOLE 500 MG/1
500 TABLET ORAL 3 TIMES DAILY
Qty: 30 TABLET | Refills: 0 | Status: SHIPPED | OUTPATIENT
Start: 2024-07-07 | End: 2024-07-25 | Stop reason: WASHOUT

## 2024-07-07 RX ORDER — ROCURONIUM BROMIDE 10 MG/ML
INJECTION, SOLUTION INTRAVENOUS AS NEEDED
Status: DISCONTINUED | OUTPATIENT
Start: 2024-07-07 | End: 2024-07-07

## 2024-07-07 RX ORDER — MEPERIDINE HYDROCHLORIDE 25 MG/ML
12.5 INJECTION INTRAMUSCULAR; INTRAVENOUS; SUBCUTANEOUS EVERY 10 MIN PRN
Status: DISCONTINUED | OUTPATIENT
Start: 2024-07-07 | End: 2024-07-07 | Stop reason: HOSPADM

## 2024-07-07 RX ORDER — HYDROMORPHONE HYDROCHLORIDE 0.2 MG/ML
0.2 INJECTION INTRAMUSCULAR; INTRAVENOUS; SUBCUTANEOUS EVERY 5 MIN PRN
Status: DISCONTINUED | OUTPATIENT
Start: 2024-07-07 | End: 2024-07-07 | Stop reason: HOSPADM

## 2024-07-07 RX ORDER — DEXTROSE 50 % IN WATER (D50W) INTRAVENOUS SYRINGE
12.5
Status: DISCONTINUED | OUTPATIENT
Start: 2024-07-07 | End: 2024-07-15 | Stop reason: HOSPADM

## 2024-07-07 RX ORDER — ACETAMINOPHEN 10 MG/ML
1000 INJECTION, SOLUTION INTRAVENOUS EVERY 8 HOURS
Status: DISCONTINUED | OUTPATIENT
Start: 2024-07-07 | End: 2024-07-10

## 2024-07-07 RX ORDER — LABETALOL HYDROCHLORIDE 5 MG/ML
5 INJECTION, SOLUTION INTRAVENOUS ONCE AS NEEDED
Status: DISCONTINUED | OUTPATIENT
Start: 2024-07-07 | End: 2024-07-07 | Stop reason: HOSPADM

## 2024-07-07 RX ORDER — PHENYLEPHRINE HCL IN 0.9% NACL 1 MG/10 ML
SYRINGE (ML) INTRAVENOUS AS NEEDED
Status: DISCONTINUED | OUTPATIENT
Start: 2024-07-07 | End: 2024-07-07

## 2024-07-07 RX ORDER — OXYCODONE HYDROCHLORIDE 5 MG/1
5 TABLET ORAL EVERY 4 HOURS PRN
Status: DISCONTINUED | OUTPATIENT
Start: 2024-07-07 | End: 2024-07-07 | Stop reason: HOSPADM

## 2024-07-07 RX ORDER — ONDANSETRON HYDROCHLORIDE 2 MG/ML
4 INJECTION, SOLUTION INTRAVENOUS EVERY 6 HOURS PRN
Status: DISCONTINUED | OUTPATIENT
Start: 2024-07-07 | End: 2024-07-15 | Stop reason: HOSPADM

## 2024-07-07 RX ORDER — ONDANSETRON HYDROCHLORIDE 2 MG/ML
INJECTION, SOLUTION INTRAVENOUS AS NEEDED
Status: DISCONTINUED | OUTPATIENT
Start: 2024-07-07 | End: 2024-07-07

## 2024-07-07 RX ORDER — ALBUTEROL SULFATE 0.83 MG/ML
2.5 SOLUTION RESPIRATORY (INHALATION) ONCE AS NEEDED
Status: DISCONTINUED | OUTPATIENT
Start: 2024-07-07 | End: 2024-07-07 | Stop reason: HOSPADM

## 2024-07-07 RX ORDER — EZETIMIBE 10 MG/1
10 TABLET ORAL DAILY
Status: DISCONTINUED | OUTPATIENT
Start: 2024-07-07 | End: 2024-07-15 | Stop reason: HOSPADM

## 2024-07-07 RX ORDER — MAGNESIUM SULFATE HEPTAHYDRATE 40 MG/ML
2 INJECTION, SOLUTION INTRAVENOUS ONCE
Status: COMPLETED | OUTPATIENT
Start: 2024-07-07 | End: 2024-07-07

## 2024-07-07 RX ORDER — DEXTROSE 50 % IN WATER (D50W) INTRAVENOUS SYRINGE
25
Status: DISCONTINUED | OUTPATIENT
Start: 2024-07-07 | End: 2024-07-15 | Stop reason: HOSPADM

## 2024-07-07 RX ORDER — NAPROXEN SODIUM 220 MG/1
81 TABLET, FILM COATED ORAL DAILY
Status: DISCONTINUED | OUTPATIENT
Start: 2024-07-07 | End: 2024-07-15 | Stop reason: HOSPADM

## 2024-07-07 RX ORDER — LOSARTAN POTASSIUM 50 MG/1
50 TABLET ORAL DAILY
Status: DISCONTINUED | OUTPATIENT
Start: 2024-07-07 | End: 2024-07-12

## 2024-07-07 RX ORDER — MIDAZOLAM HYDROCHLORIDE 1 MG/ML
INJECTION, SOLUTION INTRAMUSCULAR; INTRAVENOUS AS NEEDED
Status: DISCONTINUED | OUTPATIENT
Start: 2024-07-07 | End: 2024-07-07

## 2024-07-07 RX ORDER — SODIUM CHLORIDE, SODIUM LACTATE, POTASSIUM CHLORIDE, CALCIUM CHLORIDE 600; 310; 30; 20 MG/100ML; MG/100ML; MG/100ML; MG/100ML
75 INJECTION, SOLUTION INTRAVENOUS CONTINUOUS
Status: DISCONTINUED | OUTPATIENT
Start: 2024-07-07 | End: 2024-07-09

## 2024-07-07 RX ORDER — MIDAZOLAM HYDROCHLORIDE 1 MG/ML
1 INJECTION, SOLUTION INTRAMUSCULAR; INTRAVENOUS ONCE AS NEEDED
Status: DISCONTINUED | OUTPATIENT
Start: 2024-07-07 | End: 2024-07-07 | Stop reason: HOSPADM

## 2024-07-07 RX ADMIN — ACETAMINOPHEN 1000 MG: 10 INJECTION INTRAVENOUS at 21:41

## 2024-07-07 RX ADMIN — PIPERACILLIN SODIUM AND TAZOBACTAM SODIUM 4.5 G: 4; .5 INJECTION, SOLUTION INTRAVENOUS at 04:33

## 2024-07-07 RX ADMIN — MAGNESIUM SULFATE HEPTAHYDRATE 2 G: 40 INJECTION, SOLUTION INTRAVENOUS at 06:23

## 2024-07-07 RX ADMIN — SUGAMMADEX 200 MG: 100 INJECTION, SOLUTION INTRAVENOUS at 13:47

## 2024-07-07 RX ADMIN — IOHEXOL 75 ML: 350 INJECTION, SOLUTION INTRAVENOUS at 03:42

## 2024-07-07 RX ADMIN — LIDOCAINE HYDROCHLORIDE 100 MG: 20 INJECTION, SOLUTION INTRAVENOUS at 12:33

## 2024-07-07 RX ADMIN — PIPERACILLIN SODIUM AND TAZOBACTAM SODIUM 3.38 G: 3; .375 INJECTION, SOLUTION INTRAVENOUS at 17:06

## 2024-07-07 RX ADMIN — MIDAZOLAM 2 MG: 1 INJECTION INTRAMUSCULAR; INTRAVENOUS at 12:24

## 2024-07-07 RX ADMIN — ACETAMINOPHEN 1000 MG: 10 INJECTION INTRAVENOUS at 06:43

## 2024-07-07 RX ADMIN — DEXAMETHASONE SODIUM PHOSPHATE 4 MG: 4 INJECTION, SOLUTION INTRAMUSCULAR; INTRAVENOUS at 12:36

## 2024-07-07 RX ADMIN — FENTANYL CITRATE 100 MCG: 50 INJECTION INTRAMUSCULAR; INTRAVENOUS at 12:33

## 2024-07-07 RX ADMIN — SODIUM CHLORIDE, POTASSIUM CHLORIDE, SODIUM LACTATE AND CALCIUM CHLORIDE 75 ML/HR: 600; 310; 30; 20 INJECTION, SOLUTION INTRAVENOUS at 06:20

## 2024-07-07 RX ADMIN — PIPERACILLIN SODIUM AND TAZOBACTAM SODIUM 3.38 G: 3; .375 INJECTION, SOLUTION INTRAVENOUS at 22:15

## 2024-07-07 RX ADMIN — Medication 200 MCG: at 12:55

## 2024-07-07 RX ADMIN — HYDROMORPHONE HYDROCHLORIDE 0.2 MG: 0.2 INJECTION, SOLUTION INTRAMUSCULAR; INTRAVENOUS; SUBCUTANEOUS at 14:57

## 2024-07-07 RX ADMIN — HYDROMORPHONE HYDROCHLORIDE 0.5 MG: 0.5 INJECTION, SOLUTION INTRAMUSCULAR; INTRAVENOUS; SUBCUTANEOUS at 06:23

## 2024-07-07 RX ADMIN — SODIUM CHLORIDE, POTASSIUM CHLORIDE, SODIUM LACTATE AND CALCIUM CHLORIDE 1000 ML: 600; 310; 30; 20 INJECTION, SOLUTION INTRAVENOUS at 04:11

## 2024-07-07 RX ADMIN — ROCURONIUM BROMIDE 50 MG: 10 INJECTION, SOLUTION INTRAVENOUS at 12:33

## 2024-07-07 RX ADMIN — PIPERACILLIN SODIUM AND TAZOBACTAM SODIUM 3.38 G: 3; .375 INJECTION, SOLUTION INTRAVENOUS at 11:08

## 2024-07-07 RX ADMIN — ONDANSETRON 4 MG: 2 INJECTION INTRAMUSCULAR; INTRAVENOUS at 12:36

## 2024-07-07 RX ADMIN — ONDANSETRON 4 MG: 2 INJECTION INTRAMUSCULAR; INTRAVENOUS at 13:40

## 2024-07-07 RX ADMIN — Medication 100 MCG: at 12:51

## 2024-07-07 RX ADMIN — PROPOFOL 200 MG: 10 INJECTION, EMULSION INTRAVENOUS at 12:33

## 2024-07-07 RX ADMIN — HYDROMORPHONE HYDROCHLORIDE 0.2 MG: 0.2 INJECTION, SOLUTION INTRAMUSCULAR; INTRAVENOUS; SUBCUTANEOUS at 14:35

## 2024-07-07 SDOH — SOCIAL STABILITY: SOCIAL INSECURITY: ARE YOU OR HAVE YOU BEEN THREATENED OR ABUSED PHYSICALLY, EMOTIONALLY, OR SEXUALLY BY ANYONE?: NO

## 2024-07-07 SDOH — SOCIAL STABILITY: SOCIAL INSECURITY: WERE YOU ABLE TO COMPLETE ALL THE BEHAVIORAL HEALTH SCREENINGS?: YES

## 2024-07-07 SDOH — SOCIAL STABILITY: SOCIAL INSECURITY: HAVE YOU HAD THOUGHTS OF HARMING ANYONE ELSE?: NO

## 2024-07-07 SDOH — SOCIAL STABILITY: SOCIAL INSECURITY: HAVE YOU HAD ANY THOUGHTS OF HARMING ANYONE ELSE?: NO

## 2024-07-07 SDOH — SOCIAL STABILITY: SOCIAL INSECURITY: DO YOU FEEL UNSAFE GOING BACK TO THE PLACE WHERE YOU ARE LIVING?: NO

## 2024-07-07 SDOH — SOCIAL STABILITY: SOCIAL INSECURITY: HAS ANYONE EVER THREATENED TO HURT YOUR FAMILY OR YOUR PETS?: NO

## 2024-07-07 SDOH — SOCIAL STABILITY: SOCIAL INSECURITY: ARE THERE ANY APPARENT SIGNS OF INJURIES/BEHAVIORS THAT COULD BE RELATED TO ABUSE/NEGLECT?: NO

## 2024-07-07 SDOH — HEALTH STABILITY: MENTAL HEALTH: CURRENT SMOKER: 0

## 2024-07-07 SDOH — SOCIAL STABILITY: SOCIAL INSECURITY: DO YOU FEEL ANYONE HAS EXPLOITED OR TAKEN ADVANTAGE OF YOU FINANCIALLY OR OF YOUR PERSONAL PROPERTY?: NO

## 2024-07-07 SDOH — SOCIAL STABILITY: SOCIAL INSECURITY: DOES ANYONE TRY TO KEEP YOU FROM HAVING/CONTACTING OTHER FRIENDS OR DOING THINGS OUTSIDE YOUR HOME?: NO

## 2024-07-07 ASSESSMENT — ENCOUNTER SYMPTOMS
EYE DISCHARGE: 0
APNEA: 0
DIFFICULTY URINATING: 0
EYES NEGATIVE: 1
VOMITING: 1
PSYCHIATRIC NEGATIVE: 1
APPETITE CHANGE: 1
CARDIOVASCULAR NEGATIVE: 1
ACTIVITY CHANGE: 1
CHILLS: 0
CONSTITUTIONAL NEGATIVE: 1
ALLERGIC/IMMUNOLOGIC NEGATIVE: 1
ENDOCRINE NEGATIVE: 1
WEAKNESS: 1
MUSCULOSKELETAL NEGATIVE: 1
ARTHRALGIAS: 0
ABDOMINAL PAIN: 1
FATIGUE: 0
COLOR CHANGE: 0
RESPIRATORY NEGATIVE: 1
NAUSEA: 1
ABDOMINAL DISTENTION: 1
AGITATION: 0
DIZZINESS: 0

## 2024-07-07 ASSESSMENT — LIFESTYLE VARIABLES
HAVE YOU OR SOMEONE ELSE BEEN INJURED AS A RESULT OF YOUR DRINKING: NO
HOW MANY STANDARD DRINKS CONTAINING ALCOHOL DO YOU HAVE ON A TYPICAL DAY: 3 OR 4
HOW OFTEN DO YOU HAVE A DRINK CONTAINING ALCOHOL: 2-3 TIMES A WEEK
HOW OFTEN DURING THE LAST YEAR HAVE YOU HAD A FEELING OF GUILT OR REMORSE AFTER DRINKING: NEVER
HOW OFTEN DURING THE LAST YEAR HAVE YOU FAILED TO DO WHAT WAS NORMALLY EXPECTED FROM YOU BECAUSE OF DRINKING: NEVER
SKIP TO QUESTIONS 9-10: 0
HOW OFTEN DURING THE LAST YEAR HAVE YOU NEEDED AN ALCOHOLIC DRINK FIRST THING IN THE MORNING TO GET YOURSELF GOING AFTER A NIGHT OF HEAVY DRINKING: NEVER
AUDIT-C TOTAL SCORE: 6
HOW OFTEN DO YOU HAVE 6 OR MORE DRINKS ON ONE OCCASION: MONTHLY
AUDIT TOTAL SCORE: 6
HOW OFTEN DURING THE LAST YEAR HAVE YOU BEEN UNABLE TO REMEMBER WHAT HAPPENED THE NIGHT BEFORE BECAUSE YOU HAD BEEN DRINKING: NEVER
HOW OFTEN DURING THE LAST YEAR HAVE YOU FOUND THAT YOU WERE NOT ABLE TO STOP DRINKING ONCE YOU HAD STARTED: NEVER
AUDIT-C TOTAL SCORE: 6
HAS A RELATIVE, FRIEND, DOCTOR, OR ANOTHER HEALTH PROFESSIONAL EXPRESSED CONCERN ABOUT YOUR DRINKING OR SUGGESTED YOU CUT DOWN: NO
AUDIT TOTAL SCORE: 0

## 2024-07-07 ASSESSMENT — PAIN DESCRIPTION - DESCRIPTORS: DESCRIPTORS: ACHING

## 2024-07-07 ASSESSMENT — COGNITIVE AND FUNCTIONAL STATUS - GENERAL
MOBILITY SCORE: 24
DAILY ACTIVITIY SCORE: 24
PATIENT BASELINE BEDBOUND: NO

## 2024-07-07 ASSESSMENT — PAIN DESCRIPTION - LOCATION
LOCATION: ABDOMEN

## 2024-07-07 ASSESSMENT — ACTIVITIES OF DAILY LIVING (ADL)
BATHING: INDEPENDENT
ASSISTIVE_DEVICE: EYEGLASSES
HEARING - LEFT EAR: FUNCTIONAL
PATIENT'S MEMORY ADEQUATE TO SAFELY COMPLETE DAILY ACTIVITIES?: YES
LACK_OF_TRANSPORTATION: NO
HEARING - RIGHT EAR: FUNCTIONAL
DRESSING YOURSELF: INDEPENDENT
ADEQUATE_TO_COMPLETE_ADL: YES
TOILETING: INDEPENDENT
JUDGMENT_ADEQUATE_SAFELY_COMPLETE_DAILY_ACTIVITIES: YES
FEEDING YOURSELF: INDEPENDENT
WALKS IN HOME: INDEPENDENT
GROOMING: INDEPENDENT

## 2024-07-07 ASSESSMENT — PAIN DESCRIPTION - ORIENTATION
ORIENTATION: RIGHT;LEFT;LOWER
ORIENTATION: RIGHT;LOWER
ORIENTATION: RIGHT;LEFT;LOWER

## 2024-07-07 ASSESSMENT — COLUMBIA-SUICIDE SEVERITY RATING SCALE - C-SSRS
1. IN THE PAST MONTH, HAVE YOU WISHED YOU WERE DEAD OR WISHED YOU COULD GO TO SLEEP AND NOT WAKE UP?: NO
2. HAVE YOU ACTUALLY HAD ANY THOUGHTS OF KILLING YOURSELF?: NO
6. HAVE YOU EVER DONE ANYTHING, STARTED TO DO ANYTHING, OR PREPARED TO DO ANYTHING TO END YOUR LIFE?: NO

## 2024-07-07 ASSESSMENT — PAIN SCALES - GENERAL
PAINLEVEL_OUTOF10: 5 - MODERATE PAIN
PAIN_LEVEL: 3
PAINLEVEL_OUTOF10: 5 - MODERATE PAIN
PAINLEVEL_OUTOF10: 5 - MODERATE PAIN
PAINLEVEL_OUTOF10: 4
PAINLEVEL_OUTOF10: 3
PAINLEVEL_OUTOF10: 4
PAINLEVEL_OUTOF10: 0 - NO PAIN
PAINLEVEL_OUTOF10: 4
PAINLEVEL_OUTOF10: 3
PAINLEVEL_OUTOF10: 4

## 2024-07-07 ASSESSMENT — PAIN DESCRIPTION - PROGRESSION: CLINICAL_PROGRESSION: GRADUALLY IMPROVING

## 2024-07-07 ASSESSMENT — PAIN - FUNCTIONAL ASSESSMENT
PAIN_FUNCTIONAL_ASSESSMENT: 0-10

## 2024-07-07 ASSESSMENT — PATIENT HEALTH QUESTIONNAIRE - PHQ9
2. FEELING DOWN, DEPRESSED OR HOPELESS: NOT AT ALL
SUM OF ALL RESPONSES TO PHQ9 QUESTIONS 1 & 2: 0
1. LITTLE INTEREST OR PLEASURE IN DOING THINGS: NOT AT ALL

## 2024-07-07 NOTE — SIGNIFICANT EVENT
After the consideration patient now amenable for surgical intervention.  Given dual antiplatelet use will order platelet transfusion prior to operating time and given medical history will have patient evaluated by IMS team prior to the operating room.  Patient in agreement with plan going forward.    Patient will be discussed with Attending Physician Dr. Jazlyn Chan PGY4  General Surgery Service

## 2024-07-07 NOTE — ANESTHESIA PREPROCEDURE EVALUATION
Patient: Jose Olivia    Procedure Information       Anesthesia Start Date/Time: 07/07/24 1224    Procedure: Appendectomy Laparoscopy    Location: [facility] / [facility]    Surgeons: Gilles Hobson MD            Relevant Problems   Anesthesia (within normal limits)      Cardiac   (+) Abnormal EKG   (+) Benign essential hypertension   (+) CAD (coronary artery disease)   (+) HLD (hyperlipidemia)      Pulmonary   (+) COPD, mild (Multi)   (+) Mild chronic obstructive pulmonary disease (Multi)   (+) Obstructive sleep apnea syndrome      Neuro   (+) Acute carpal tunnel syndrome, right   (+) Diabetic peripheral neuropathy (Multi)   (+) Hemiplegia (Multi)   (+) Stroke (Multi)   (+) Ulnar neuropathy at elbow, right      Endocrine   (+) Diabetic peripheral neuropathy (Multi)      Musculoskeletal   (+) Acute carpal tunnel syndrome, right   (+) Osteoarthritis of spine with radiculopathy, cervical region   (+) Spondylosis of cervical spine      HEENT   (+) Acute sinusitis      ID   (+) Onychomycosis   (+) Tinea corporis       Clinical information reviewed:   Tobacco  Allergies  Meds   Med Hx  Surg Hx   Fam Hx  Soc Hx        NPO Detail:  No data recorded     Physical Exam    Airway  Mallampati: III  TM distance: >3 FB  Neck ROM: full     Cardiovascular   Rhythm: regular  Rate: normal     Dental        Pulmonary - normal exam     Abdominal      Other findings: Missing front upper tooth. Otherfront upper tooth chipped. Multiple chipped teeth          Anesthesia Plan    History of general anesthesia?: yes  History of complications of general anesthesia?: no    ASA 3     general     The patient is not a current smoker.  Patient was not previously instructed to abstain from smoking on day of procedure.  Patient did not smoke on day of procedure.    intravenous induction   Postoperative administration of opioids is intended.  Trial extubation is planned.  Anesthetic plan and risks discussed with patient.  Use of blood products  discussed with patient who consented to blood products.    Plan discussed with CRNA.

## 2024-07-07 NOTE — OP NOTE
Appendectomy Laparoscopy WITH UMBILICAL HERNIA REPAIR. Operative Note     Date: 2024  OR Location: [facility]    Name: Jose Olivia, : 1955, Age: 68 y.o., MRN: [id number], Sex: male    Diagnosis  Pre-op Diagnosis     * Acute appendicitis with localized peritonitis, without perforation, abscess, or gangrene [K35.30] Perforated appendicitis     Procedures  Appendectomy Laparoscopy WITH UMBILICAL HERNIA REPAIR.  16084 - HI LAPAROSCOPIC APPENDECTOMY    umbilical hernia reducible, 2cm  Surgeons      * Gilles Hobson - Primary    Resident/Fellow/Other Assistant:  Surgeons and Role:  * No surgeons found with a matching role *  Ye Palafox  Procedure Summary  Anesthesia: General  ASA: III  Anesthesia Staff: CRNA: GABRIEL Nguyen-CRNA  Estimated Blood Loss: 2mL  Intra-op Medications:   Administrations occurring from 1200 to 1340 on 24:   Medication Name Total Dose   sodium chloride 0.9 % irrigation solution 200 mL   acetaminophen (Ofirmev) injection 1,000 mg Cannot be calculated   aspirin chewable tablet 81 mg Cannot be calculated   ezetimibe (Zetia) tablet 10 mg Cannot be calculated   HYDROmorphone (Dilaudid) injection 0.2 mg Cannot be calculated   isosorbide mononitrate ER (Imdur) 24 hr tablet 30 mg Cannot be calculated   losartan (Cozaar) tablet 50 mg Cannot be calculated   ondansetron (Zofran) injection 4 mg Cannot be calculated   piperacillin-tazobactam-dextrose (Zosyn) IV 3.375 g Cannot be calculated              Anesthesia Record               Intraprocedure I/O Totals          Intake    Dexmedetomidine 0.00 mL    The total shown is the total volume documented since Anesthesia Start was filed.    lactated Ringer's infusion 1200.00 mL    Total Intake 1200 mL       Output    Urine 750 mL    Est. Blood Loss 2 mL    Total Output 752 mL       Net    Net Volume 448 mL          Specimen:   ID Type Source Tests Collected by Time   1 : APPENDIX Tissue APPENDIX SURGICAL PATHOLOGY EXAM Gilles Hobson MD  7/7/2024 [id number]        Staff:   Circulator: Cecilia Soares Person: Frida         Drains and/or Catheters:   NG/OG/Feeding Tube (Active)       Urethral Catheter Non-latex 16 Fr. (Active)       Tourniquet Times:         Implants:     Findings:       Indications: Jose Olivia is an 68 y.o. male who is having surgery for Acute appendicitis with localized peritonitis, without perforation, abscess, or gangrene [K35.30].       The patient was seen in the preoperative area. The risks, benefits, complications, treatment options, non-operative alternatives, expected recovery and outcomes were discussed with the patient. The possibilities of reaction to medication, pulmonary aspiration, injury to surrounding structures, bleeding, recurrent infection, the need for additional procedures, failure to diagnose a condition, and creating a complication requiring transfusion or operation were discussed with the patient. The patient concurred with the proposed plan, giving informed consent.  The site of surgery was properly noted/marked if necessary per policy. The patient has been actively warmed in preoperative area. Preoperative antibiotics have been ordered and given within scheduled hours of incision. Venous thrombosis prophylaxis have been ordered including bilateral sequential compression devices    Procedure Details: Patient presented with signs and symptoms of appendicitis.  Imaging laboratory report was supportive of this diagnosis.  His imaging did not suggest perforation.  He was seen and transfused platelets due to his dual platelet antagonist and was promptly brought to the operating room.  He was found to have a reducible umbilical hernia on physical exam.    Patient was brought to the operating room placed in the supine position placed under general anesthesia.  Orogastric Mcintosh catheter PAS stockings were placed.  Abdomen was prepped and draped in usual sterile fashion.  A left periumbilical incision was made carried  down to the fascia and the umbilical hernia sac was encircled for 360 degrees at the fascial level sac was entered sac contained herniated preperitoneal fat which was reducible.  The preperitoneal fat was reduced Aurora clamp was used to enter the peritoneal cavity bluntly Kocher clamps were placed bilaterally and stay sutures of 0 Vicryl were placed and Florence trocar was placed.  Abdomen was insufflated to a pressure of 15 with CO2 gas.  Suprapubic and left lower quadrant abdominal ports were made with 5 mm ports.  Patient was placed headdown and rolled to the left.  Initial view showed a small amount of brownish turbid fluid in the right lower quadrant surrounding an appendix that appeared perforated with some stool staining on the local peritoneum.  Fluid was suctioned up for approximately 5 cc this was cultured.  Right lower quadrant was gently irrigated and suctioned for clear return the appendix was elevated in the anatomic position he had a fair amount of edema of the mesoappendix and the proximal aspect medially and he was found to have what appeared to be a perforation in the mid body of the appendix proximal appendix looked good.  The dissection initially started with scissors at the appendical cecal junction laterally as this had a good view and we are able to take down the peritoneum giving us a good view of the mesoappendix.  Using sequential Maryland clips and then scissors the mesoappendix at the appendical cecal junction was taken down from lateral to medial to provide us with a good view of the appendical cecal junction.  Once this was easily visualizable a tissue stapler was placed and fired across the appendical cecal junction resulting in a excellent appearing staple line the appendix then being placed into a bag.  The right lower quadrant was irrigated and suctioned for clear return there was minimal oozing during the case and at the end there was no oozing at all.  Pelvis was viewed as was the  right upper quadrant there did not appear to be any extra fluid the right lower quadrant was irrigated again for clear return and 5 mm ports were removed the umbilical hernia was closed at the fascial level with interrupted figure-of-eight 0 Prolene sutures.  Umbilical dermis was tacked down to the fascia using 2-0 Vicryl.  Skin was closed with subcuticular Vicryl.  Patient tolerated the procedure well and will be removed to the recovery room in satisfactory condition.  Complications:  None; patient tolerated the procedure well.    Disposition: PACU - hemodynamically stable.  Condition: stable         Additional Details:       Attending Attestation: I performed the procedure.    Gilles Hobson  Phone Number: [phone]

## 2024-07-07 NOTE — ED PROVIDER NOTES
Jose Olivia is a 68 y.o. patient presenting to the ED for abdominal pain.  The patient states that his pain began yesterday morning.  It was initially located in the center upper part of his abdomen.  It has slowly become lower and located more to the right.  Is associated with nausea, vomiting.  He has had a couple of episodes of softer stool, but denies watery, bloody, or mucus containing stool.  He has had chills and sweats intermittently throughout the day.  He denies any known fever.  He denies similar pain previously.    Additional History Obtained from: None  Limitations to History: None  ------------------------------------------------------------------------------------------------------------------------------------------  Physical Exam:  Appearance: Alert, cooperative,   Skin: Warm, dry, appropriate color for ethnicity.  Eyes: Cornea clear. No scleral icterus or injection.   ENT: Mucous membranes moist.  Pulmonary: No accessory muscle use or stridor. Clear lung sounds bilaterally without rhonchi or wheezing.   Cardiac: Heart sounds regular without murmur. B/L radial pulses full and symmetric.   Abdomen: Soft, tenderness in the right lower quadrant with guarding in the right lower quadrant.  Musculoskeletal: No gross deformities.   Neurological: Face symmetrical. Voice clear. Appropriately conversant.   Psychiatric: Appropriate mood and affect.    Medical Decision Making:  Chronic Medical Conditions Significantly Affecting Care:  has a past medical history of Diabetes mellitus (Multi), Other conditions influencing health status, Personal history of other diseases of the circulatory system (03/08/2017), Personal history of other diseases of the respiratory system (03/08/2017), and Personal history of other endocrine, nutritional and metabolic disease (03/08/2017).    Social Determinants of Health Significantly Affecting Care: None identified    Differential Diagnosis Considered but not limited to:  Appendicitis, diverticulitis, less likely cholecystitis, pyelonephritis.  Gastroenteritis is also a consideration.      External Records Reviewed:   I reviewed recent and relevant outside records including:     Independent Interpretation of Studies: The following studies were ordered as part of the emergency department work up and independently interpreted by me. See ED Course for details.    CBC with leukocytosis to 21.3.  The patient is hemodynamically stable without significant tachycardia or tachypnea.  Lactate is normal at 1.6.  CMP with mild acute on chronic renal dysfunction.  Current creatinine 1.6 with baseline of 1.2.  Remainder of CMP is unremarkable.    CT scan does show evidence of acute appendicitis without perforation or abscess.  The patient was given Zosyn.  Additionally he is receiving IV fluids.    EKG performed at 0106 and interpreted by me shows sinus rhythm at a rate of 94.  Intervals are normal.  There is left axis deviation.  There are no significant ST elevations or depressions.  No T wave changes.  No STEMI.    I discussed the case with the on-call surgical resident.  He did evaluate the patient and recommended appendectomy.  The patient initially declined this saying he had things to do at home.  After completing initial dose of IV antibiotics the patient's pain is returning.  After further discussion with him he is agreeable to staying for definitive management of his appendicitis.  I did discuss this with the surgical resident.  He does request that I involve medicine as the patient is on dual antiplatelet therapy and has cardiac history.  This was discussed with Dr. Lee, hospitalist.  He will admit the patient to stepdown with surgery consultation.    Diagnoses as of 07/07/24 0603   Acute appendicitis with localized peritonitis, without perforation, abscess, or gangrene          Mandy Platt,   07/07/24 0615       Mandy Platt, DO  07/07/24 0741

## 2024-07-07 NOTE — CONSULTS
[facility]  GENERAL SURGERY/TRAUMA SURGERY - HISTORY AND PHYSICAL / CONSULT    Patient Name: Jose Olivia  MRN: [id number]  Admit Date: 707  : 1955  AGE: 68 y.o.   GENDER: male    CHIEF COMPLAINT/REASON FOR CONSULT:  RLQ pain    Patient is a 68-year-old male who presented with 48 hours of abdominal pain.  Patient states the pain started Friday night and persisted prompting patient to come in the emergency room.  Patient denies any fevers, chills, shortness of breath.  States that the pain initially started in the epigastrium encounter worked its way periumbilical and now went into the right lower quadrant.  Patient had CT scan concerning for acute appendicitis as well as leukocytosis of 21,000. Patient denies any history of colonoscopy, egd or abdominal surgeries, but has had prostate surgery in the past.     PAST MEDICAL HISTORY:   Past Medical History:   Diagnosis Date    Diabetes mellitus (Multi)     Other conditions influencing health status     Type 2 diabetes mellitus with diabetic retinopathy and without macular edema, unspecified retinopathy severity    Personal history of other diseases of the circulatory system 2017    History of hypertension    Personal history of other diseases of the respiratory system 2017    History of chronic obstructive lung disease    Personal history of other endocrine, nutritional and metabolic disease 2017    History of hyperlipidemia     Past Surgical History:   Procedure Laterality Date    KNEE SURGERY  2016    Knee Surgery Right    MR HEAD ANGIO WO IV CONTRAST  2022    MR HEAD ANGIO WO IV CONTRAST 2022 [facility]ICAL LEGAC[facility]    MR NECK ANGIO WO IV CONTRAST  2022    MR NECK ANGIO WO IV CONTRAST 2022 [facility] CLINICAL LEGACY    PROSTATE SURGERY  2016    Prostate Surgery    TONSILLECTOMY  2016    Tonsillectomy       No family history on file.  SOCIAL HISTORY:    Social History  "    Tobacco Use   Smoking Status Former    Types: Cigarettes   Smokeless Tobacco Never     Social History     Substance and Sexual Activity   Alcohol Use None     Prior to Admission medications    Medication Sig Start Date End Date Taking? Authorizing Provider   ascorbic acid, vitamin C, 500 mg capsule Take by mouth.    Historical Provider, MD   aspirin 81 mg chewable tablet Chew 1 tablet (81 mg) once daily.    Historical Provider, MD   blood sugar diagnostic, disc (Breeze 2 Test Strips) strip Dilma Breeze 2 Test DISK Quantity: 400 Refills: 0 Start : 24-Oct-2014 Active 10/24/14   Historical Provider, MD   cetirizine (ZyrTEC) 10 mg tablet Take 1 tablet (10 mg) by mouth once daily.    Historical Provider, MD   cholecalciferol (Vitamin D-3) 50 mcg (2,000 unit) capsule Take by mouth.    Historical Provider, MD   chromium amino acid chelate 400 mcg tablet Take by mouth.    Historical Provider, MD   clopidogrel (Plavix) 75 mg tablet Take 1 tablet (75 mg) by mouth once daily. 4/19/24 4/19/25  Diogo Murdock MD   cyanocobalamin (Vitamin B-12) 500 mcg tablet Take by mouth.    Historical Provider, MD   Easy Touch Insulin Syringe 0.5 mL 31 gauge x 5/16\" syringe  10/30/23   Historical Provider, MD   ezetimibe (Zetia) 10 mg tablet Take 1 tablet (10 mg) by mouth once daily. 4/13/22   Historical Provider, MD   folic acid/vit B complex and C (B complex-vitamin C-folic acid) 400 mcg tablet extended release Take 1 tablet by mouth once daily.    Historical Provider, MD   glycopyrrolate-formoteroL 9-4.8 mcg HFA aerosol inhaler Inhale every 12 hours. 6/24/19   Historical Provider, MD   guaiFENesin (Humibid 3) 400 mg tablet Take 1 tablet (400 mg) by mouth. 5/9/19   Historical Provider, MD   insulin glargine (Lantus) 100 unit/mL (3 mL) pen Inject under the skin. 10/9/17   Historical Provider, MD   insulin lispro (HumaLOG) 100 unit/mL injection Inject under the skin. 6/16/15   Historical Provider, MD   insulin NPH and regular human (HumuLIN " 70/30 U-100 Insulin) 100 unit/mL (70-30) injection Inject under the skin twice a day. 6/27/19   Historical Provider, MD   insulin NPH, Isophane, (NovoLIN N NPH U-100 Insulin) 100 unit/mL injection Inject under the skin.    Historical Provider, MD   insulin regular (NovoLIN R Regular U100 Insulin) 100 unit/mL injection injectable 3 times a day (before meals)Sliding scale coverage    Historical Provider, MD   isosorbide mononitrate ER (Imdur) 30 mg 24 hr tablet Take 1 tablet (30 mg) by mouth once daily. 4/19/24 4/19/25  Diogo Murdock MD   KRILL OIL ORAL Take by mouth.    Historical Provider, MD   losartan (Cozaar) 50 mg tablet Take 1 tablet (50 mg) by mouth once daily. 7/28/22   Historical Provider, MD   magnesium oxide,aspartate,citr (Triple Magnesium Complex) 400 mg magnesium capsule Take by mouth.    Historical Provider, MD   multivitamin tablet Take by mouth.    Historical Provider, MD   mv-mn-iron-FA-herbal cmplx#190 (Vitamin D3 Complete) 18 mg iron-800 mcg-150 mg tablet Take by mouth.    Historical Provider, MD   nystatin (Mycostatin) 100,000 unit/mL suspension     Historical Provider, MD   OneTouch Verio test strips strip use to take blood sugars at least 3-4 times per day    Historical Provider, MD   Ozempic 0.25 mg or 0.5 mg (2 mg/3 mL) pen injector  5/18/23   Historical Provider, MD BELLA WILLIAM ORAL Take by mouth.    Historical Provider, MD   SITagliptin phosphate (Januvia) 100 mg tablet Take by mouth once daily. 1/18/19   Historical Provider, MD   Stiolto Respimat 2.5-2.5 mcg/actuation mist inhaler 2 puffs inhaled once a day 2/13/24   Historical Provider, MD   testosterone cypionate (Depo-Testosterone) 100 mg/mL injection Inject into the muscle. 1/18/19   Historical Provider, MD   turmeric root extract 500 mg tablet Take by mouth.    Historical Provider, MD   vitamin B complex tablet Take 1 tablet by mouth once daily. 4/13/22   Historical Provider, MD   zolpidem (Ambien) 5 mg tablet  5/4/23    Historical Provider, MD       Allergies   Allergen Reactions    Metformin Diarrhea    Lactate Rash       REVIEW OF SYSTEMS:  Review of Systems   Constitutional:  Positive for activity change and appetite change. Negative for chills and fatigue.   HENT:  Negative for congestion.    Eyes:  Negative for discharge.   Respiratory:  Negative for apnea.    Cardiovascular:  Negative for chest pain.   Gastrointestinal:  Positive for abdominal distention and abdominal pain.   Endocrine: Negative for cold intolerance.   Genitourinary:  Negative for difficulty urinating.   Musculoskeletal:  Negative for arthralgias.   Skin:  Negative for color change.   Allergic/Immunologic: Negative for environmental allergies.   Neurological:  Negative for dizziness.   Psychiatric/Behavioral:  Negative for agitation.      PHYSICAL EXAM:  Physical Exam  Constitutional:       Appearance: Normal appearance.   HENT:      Head: Normocephalic.      Right Ear: Tympanic membrane normal.      Nose: Nose normal.      Mouth/Throat:      Mouth: Mucous membranes are moist.   Eyes:      Pupils: Pupils are equal, round, and reactive to light.   Cardiovascular:      Pulses: Normal pulses.   Pulmonary:      Effort: Pulmonary effort is normal.   Abdominal:      General: There is distension.      Tenderness: There is abdominal tenderness. There is no guarding.   Musculoskeletal:         General: Normal range of motion.      Cervical back: Normal range of motion.   Skin:     General: Skin is warm.      Capillary Refill: Capillary refill takes less than 2 seconds.   Neurological:      General: No focal deficit present.      Mental Status: He is alert.           LABS:  Results from last 7 days   Lab Units 07/07/24  0156   WBC AUTO x10*3/uL 21.3*   HEMOGLOBIN g/dL 16.4   HEMATOCRIT % 46.4   PLATELETS AUTO x10*3/uL 219   NEUTROS PCT AUTO % 95.1   LYMPHS PCT AUTO % 1.3   MONOS PCT AUTO % 2.8   EOS PCT AUTO % 0.0         Results from last 7 days   Lab Units  07/07/24  0156   SODIUM mmol/L 138   POTASSIUM mmol/L 4.1   CHLORIDE mmol/L 107   CO2 mmol/L 23   BUN mg/dL 26*   CREATININE mg/dL 1.67*   CALCIUM mg/dL 9.4   PROTEIN TOTAL g/dL 6.4   BILIRUBIN TOTAL mg/dL 1.3*   ALK PHOS U/L 68   ALT U/L 19   AST U/L 14   GLUCOSE mg/dL 166*     Results from last 7 days   Lab Units 07/07/24  0156   BILIRUBIN TOTAL mg/dL 1.3*             I have reviewed all laboratory and imaging results ordered/pertinent for this encounter.      ==============================================================================  ASSESSMENT/PLAN:  Patient is a 68-year-old male past medical history of coronary artery disease, hypertension, drug who presented with abdominal pain and findings concerning for acute appendicitis.  Surgical management recommended for patient however given issues related to water heater patient insisting needs to take care of matters prior to any surgical intervention.  Risks of perforation, sepsis, death discussed with patient and patient decided to go AGAINST MEDICAL ADVICE.  Patient will be discharged home with oral antibiotics with a follow-up appointment with Dr. Hobson tomorrow to help schedule surgery.    Plan:  -Patient leaving AMA  -follow up with Dr. Hobson as an outpatient 07/08  -Oral Abx, Augmentin, Flagyl    Patient will be discussed with Attending Physician Dr. Jazlyn Chan PGY3  General Surgery Service   ==============================================================================

## 2024-07-07 NOTE — CARE PLAN
The patient's goals for the shift include      The clinical goals for the shift include Tolerate appendectomy

## 2024-07-07 NOTE — ED TRIAGE NOTES
Pt states he started with mild abdominal pain on Friday. Pt woke on Saturday with more intense pain. Pt states he vomited several times throughout the day and had multiple BM's. Pt states he had the chills and was shaky all day.

## 2024-07-07 NOTE — H&P
History Of Present Illness  Jose Olivia is a 68 y.o. male with past medical history of HTN, CAD, mild COPD, DM with diabetic peripheral neuropathy, hyperlipidemia, mild renal insufficiency and CVA 2018 (on aspirin and Plavix since that time) comes to the hospital with abdominal pain.  The patient states that his pain began yesterday morning while he was working on his water heater.  According the patient the pain moved from the middle of the abdomen more to the right.  He reported some nausea and vomiting.  He also reported some diarrhea.  He reported some chills and sweats but denies any chest pain or shortness of breath.  Initially the patient wanted to leave AMA to finish his water heater work but then later decided to stay for surgical intervention.  At this time the patient states his abdominal pain is improved and he is awaiting to go to the OR for surgery later today.     Past Medical History  He has a past medical history of Diabetes mellitus (Multi), Other conditions influencing health status, Personal history of other diseases of the circulatory system (03/08/2017), Personal history of other diseases of the respiratory system (03/08/2017), and Personal history of other endocrine, nutritional and metabolic disease (03/08/2017).    Surgical History  He has a past surgical history that includes Prostate surgery (04/05/2016); Knee surgery (04/05/2016); Tonsillectomy (04/05/2016); MR angio neck wo IV contrast (5/18/2022); and MR angio head wo IV contrast (5/18/2022).     Social History  He reports that he has quit smoking. His smoking use included cigarettes. He has never used smokeless tobacco. No history on file for alcohol use and drug use.    Family History  No family history on file.     Allergies  Metformin    Review of Systems   Constitutional: Negative.    HENT: Negative.     Eyes: Negative.    Respiratory: Negative.     Cardiovascular: Negative.    Gastrointestinal:  Positive for abdominal pain,  nausea and vomiting.   Endocrine: Negative.    Musculoskeletal: Negative.    Skin: Negative.    Allergic/Immunologic: Negative.    Neurological:  Positive for weakness.   Psychiatric/Behavioral: Negative.     All other systems reviewed and are negative.       Physical Exam  Constitutional:       Appearance: Normal appearance.   HENT:      Head: Normocephalic and atraumatic.      Nose: Nose normal.      Mouth/Throat:      Mouth: Mucous membranes are dry.   Eyes:      Extraocular Movements: Extraocular movements intact.      Conjunctiva/sclera: Conjunctivae normal.   Cardiovascular:      Rate and Rhythm: Normal rate and regular rhythm.      Pulses: Normal pulses.      Heart sounds: Normal heart sounds.   Pulmonary:      Effort: Pulmonary effort is normal.      Breath sounds: Normal breath sounds.   Abdominal:      General: Bowel sounds are normal.      Palpations: Abdomen is soft.      Tenderness: There is abdominal tenderness.   Musculoskeletal:         General: Normal range of motion.      Cervical back: Normal range of motion and neck supple.   Skin:     General: Skin is warm and dry.   Neurological:      General: No focal deficit present.      Mental Status: He is alert and oriented to person, place, and time. Mental status is at baseline.   Psychiatric:         Mood and Affect: Mood normal.         Behavior: Behavior normal.         Thought Content: Thought content normal.         Judgment: Judgment normal.          Last Recorded Vitals  /60   Pulse 61   Temp 36.4 °C (97.6 °F) (Temporal)   Resp 16   Wt 95.5 kg (210 lb 8.6 oz)   SpO2 95%     Relevant Results        Jose Olivia is a 68 y.o. male with past medical history of HTN, CAD, mild COPD, DM with diabetic peripheral neuropathy, hyperlipidemia, mild renal insufficiency and CVA 2018 (on aspirin and Plavix since that time) comes to the hospital with abdominal pain.  Admitted with acute appendicitis.     Assessment/Plan   Principal Problem:    Acute  appendicitis with localized peritonitis, without perforation, abscess, or gangrene      Acute appendicitis  -Finding on CAT scan concerning for acute appendicitis.  Surgery has been consulted plan 4 OR today.  -Leukocytosis secondary appendicitis.  Continue Zosyn.    HELLEN with underlying CKD  -Creatinine 1.67 on admission.  Continue IV fluid hydration  -Avoid nephrotoxins check renal functions in AM.    Diabetes mellitus  -Continue sliding scale insulin last A1c is from 2022 shows A1c of 6.5  -Recheck A1c    Coronary artery disease  -Continue with aspirin and Plavix postoperatively once okay with surgery.    Hypertension  -Holding losartan in setting of HELLEN resume once renal functions improved.    COPD  -Currently stable               Jori Blanco MD

## 2024-07-07 NOTE — ANESTHESIA PROCEDURE NOTES
Airway  Date/Time: 7/7/2024 12:33 PM  Urgency: elective      Staffing  Performed: CRNA   Authorized by: FLAKITO Nguyen    Performed by: FLAKITO Ngyuen  Patient location during procedure: OR    Indications and Patient Condition  Indications for airway management: anesthesia  Spontaneous Ventilation: absent  Sedation level: deep  Preoxygenated: yes  Patient position: sniffing  Mask difficulty assessment: 1 - vent by mask  Planned trial extubation    Final Airway Details  Final airway type: endotracheal airway      Successful airway: ETT     Successful intubation technique: video laryngoscopy  Facilitating devices/methods: intubating stylet  Blade: Nickie  Blade size: #4  ETT size (mm): 7.5  Cormack-Lehane Classification: grade IIa - partial view of glottis  Placement verified by: chest auscultation and capnometry   Measured from: lips  ETT to lips (cm): 22  Number of attempts at approach: 1  Ventilation between attempts: none  Number of other approaches attempted: 0    Additional Comments  Atraumatic intubation patricio 4. Lips teeth same as preanesthetic condition

## 2024-07-08 LAB
ANION GAP SERPL CALC-SCNC: 14 MMOL/L (ref 10–20)
BASOPHILS # BLD AUTO: 0.03 X10*3/UL (ref 0–0.1)
BASOPHILS NFR BLD AUTO: 0.2 %
BLOOD EXPIRATION DATE: NORMAL
BLOOD EXPIRATION DATE: NORMAL
BUN SERPL-MCNC: 32 MG/DL (ref 6–23)
CALCIUM SERPL-MCNC: 8.9 MG/DL (ref 8.6–10.3)
CHLORIDE SERPL-SCNC: 105 MMOL/L (ref 98–107)
CO2 SERPL-SCNC: 22 MMOL/L (ref 21–32)
CREAT SERPL-MCNC: 1.64 MG/DL (ref 0.5–1.3)
DISPENSE STATUS: NORMAL
DISPENSE STATUS: NORMAL
EGFRCR SERPLBLD CKD-EPI 2021: 45 ML/MIN/1.73M*2
EOSINOPHIL # BLD AUTO: 0 X10*3/UL (ref 0–0.7)
EOSINOPHIL NFR BLD AUTO: 0 %
ERYTHROCYTE [DISTWIDTH] IN BLOOD BY AUTOMATED COUNT: 13.1 % (ref 11.5–14.5)
EST. AVERAGE GLUCOSE BLD GHB EST-MCNC: 151 MG/DL
GLUCOSE BLD MANUAL STRIP-MCNC: 132 MG/DL (ref 74–99)
GLUCOSE BLD MANUAL STRIP-MCNC: 166 MG/DL (ref 74–99)
GLUCOSE BLD MANUAL STRIP-MCNC: 257 MG/DL (ref 74–99)
GLUCOSE BLD MANUAL STRIP-MCNC: 82 MG/DL (ref 74–99)
GLUCOSE SERPL-MCNC: 158 MG/DL (ref 74–99)
HBA1C MFR BLD: 6.9 %
HCT VFR BLD AUTO: 44.4 % (ref 41–52)
HGB BLD-MCNC: 15.2 G/DL (ref 13.5–17.5)
IMM GRANULOCYTES # BLD AUTO: 0.09 X10*3/UL (ref 0–0.7)
IMM GRANULOCYTES NFR BLD AUTO: 0.5 % (ref 0–0.9)
LYMPHOCYTES # BLD AUTO: 0.63 X10*3/UL (ref 1.2–4.8)
LYMPHOCYTES NFR BLD AUTO: 3.8 %
MCH RBC QN AUTO: 33.4 PG (ref 26–34)
MCHC RBC AUTO-ENTMCNC: 34.2 G/DL (ref 32–36)
MCV RBC AUTO: 98 FL (ref 80–100)
MONOCYTES # BLD AUTO: 0.75 X10*3/UL (ref 0.1–1)
MONOCYTES NFR BLD AUTO: 4.5 %
NEUTROPHILS # BLD AUTO: 15.06 X10*3/UL (ref 1.2–7.7)
NEUTROPHILS NFR BLD AUTO: 91 %
NRBC BLD-RTO: 0 /100 WBCS (ref 0–0)
PLATELET # BLD AUTO: 217 X10*3/UL (ref 150–450)
POTASSIUM SERPL-SCNC: 4.6 MMOL/L (ref 3.5–5.3)
PRODUCT BLOOD TYPE: 2800
PRODUCT BLOOD TYPE: 2800
PRODUCT CODE: NORMAL
PRODUCT CODE: NORMAL
RBC # BLD AUTO: 4.55 X10*6/UL (ref 4.5–5.9)
SODIUM SERPL-SCNC: 136 MMOL/L (ref 136–145)
UNIT ABO: NORMAL
UNIT ABO: NORMAL
UNIT NUMBER: NORMAL
UNIT NUMBER: NORMAL
UNIT RH: NORMAL
UNIT RH: NORMAL
UNIT VOLUME: 324
UNIT VOLUME: 325
WBC # BLD AUTO: 16.6 X10*3/UL (ref 4.4–11.3)

## 2024-07-08 PROCEDURE — 82947 ASSAY GLUCOSE BLOOD QUANT: CPT

## 2024-07-08 PROCEDURE — 2500000002 HC RX 250 W HCPCS SELF ADMINISTERED DRUGS (ALT 637 FOR MEDICARE OP, ALT 636 FOR OP/ED): Performed by: SURGERY

## 2024-07-08 PROCEDURE — 2500000001 HC RX 250 WO HCPCS SELF ADMINISTERED DRUGS (ALT 637 FOR MEDICARE OP): Performed by: SURGERY

## 2024-07-08 PROCEDURE — 51701 INSERT BLADDER CATHETER: CPT

## 2024-07-08 PROCEDURE — 99233 SBSQ HOSP IP/OBS HIGH 50: CPT | Performed by: HOSPITALIST

## 2024-07-08 PROCEDURE — 82374 ASSAY BLOOD CARBON DIOXIDE: CPT | Performed by: SURGERY

## 2024-07-08 PROCEDURE — 2500000001 HC RX 250 WO HCPCS SELF ADMINISTERED DRUGS (ALT 637 FOR MEDICARE OP): Performed by: STUDENT IN AN ORGANIZED HEALTH CARE EDUCATION/TRAINING PROGRAM

## 2024-07-08 PROCEDURE — 36415 COLL VENOUS BLD VENIPUNCTURE: CPT | Performed by: SURGERY

## 2024-07-08 PROCEDURE — 85025 COMPLETE CBC W/AUTO DIFF WBC: CPT | Performed by: SURGERY

## 2024-07-08 PROCEDURE — 2500000004 HC RX 250 GENERAL PHARMACY W/ HCPCS (ALT 636 FOR OP/ED): Performed by: SURGERY

## 2024-07-08 PROCEDURE — 2500000004 HC RX 250 GENERAL PHARMACY W/ HCPCS (ALT 636 FOR OP/ED): Mod: JZ | Performed by: INTERNAL MEDICINE

## 2024-07-08 PROCEDURE — 2060000001 HC INTERMEDIATE ICU ROOM DAILY

## 2024-07-08 PROCEDURE — 2500000001 HC RX 250 WO HCPCS SELF ADMINISTERED DRUGS (ALT 637 FOR MEDICARE OP): Performed by: INTERNAL MEDICINE

## 2024-07-08 RX ORDER — METRONIDAZOLE 500 MG/100ML
500 INJECTION, SOLUTION INTRAVENOUS EVERY 8 HOURS
Status: DISCONTINUED | OUTPATIENT
Start: 2024-07-08 | End: 2024-07-15 | Stop reason: HOSPADM

## 2024-07-08 RX ORDER — CEFTRIAXONE 2 G/50ML
2 INJECTION, SOLUTION INTRAVENOUS EVERY 24 HOURS
Status: DISCONTINUED | OUTPATIENT
Start: 2024-07-08 | End: 2024-07-15 | Stop reason: HOSPADM

## 2024-07-08 RX ORDER — OXYCODONE HYDROCHLORIDE 5 MG/1
5 TABLET ORAL EVERY 4 HOURS PRN
Status: DISCONTINUED | OUTPATIENT
Start: 2024-07-08 | End: 2024-07-15 | Stop reason: HOSPADM

## 2024-07-08 RX ORDER — OXYCODONE HYDROCHLORIDE 10 MG/1
10 TABLET ORAL EVERY 4 HOURS PRN
Status: DISCONTINUED | OUTPATIENT
Start: 2024-07-08 | End: 2024-07-15 | Stop reason: HOSPADM

## 2024-07-08 RX ADMIN — METRONIDAZOLE 500 MG: 500 INJECTION, SOLUTION INTRAVENOUS at 16:46

## 2024-07-08 RX ADMIN — METRONIDAZOLE 500 MG: 500 INJECTION, SOLUTION INTRAVENOUS at 06:42

## 2024-07-08 RX ADMIN — EZETIMIBE 10 MG: 10 TABLET ORAL at 08:40

## 2024-07-08 RX ADMIN — ISOSORBIDE MONONITRATE 30 MG: 30 TABLET, EXTENDED RELEASE ORAL at 08:40

## 2024-07-08 RX ADMIN — ACETAMINOPHEN 1000 MG: 10 INJECTION INTRAVENOUS at 06:24

## 2024-07-08 RX ADMIN — INSULIN LISPRO 6 UNITS: 100 INJECTION, SOLUTION INTRAVENOUS; SUBCUTANEOUS at 13:34

## 2024-07-08 RX ADMIN — CEFTRIAXONE SODIUM 2 G: 2 INJECTION, SOLUTION INTRAVENOUS at 08:40

## 2024-07-08 RX ADMIN — LOSARTAN POTASSIUM 50 MG: 50 TABLET, FILM COATED ORAL at 19:56

## 2024-07-08 RX ADMIN — PIPERACILLIN SODIUM AND TAZOBACTAM SODIUM 3.38 G: 3; .375 INJECTION, SOLUTION INTRAVENOUS at 04:23

## 2024-07-08 RX ADMIN — INSULIN LISPRO 2 UNITS: 100 INJECTION, SOLUTION INTRAVENOUS; SUBCUTANEOUS at 16:46

## 2024-07-08 RX ADMIN — CLOPIDOGREL 75 MG: 75 TABLET ORAL at 08:40

## 2024-07-08 RX ADMIN — ACETAMINOPHEN 1000 MG: 10 INJECTION INTRAVENOUS at 22:50

## 2024-07-08 RX ADMIN — HYDROMORPHONE HYDROCHLORIDE 0.2 MG: 0.5 INJECTION, SOLUTION INTRAMUSCULAR; INTRAVENOUS; SUBCUTANEOUS at 04:23

## 2024-07-08 RX ADMIN — ASPIRIN 81 MG CHEWABLE TABLET 81 MG: 81 TABLET CHEWABLE at 08:40

## 2024-07-08 RX ADMIN — METRONIDAZOLE 500 MG: 500 INJECTION, SOLUTION INTRAVENOUS at 23:17

## 2024-07-08 RX ADMIN — OXYCODONE HYDROCHLORIDE 10 MG: 10 TABLET ORAL at 19:56

## 2024-07-08 ASSESSMENT — PAIN DESCRIPTION - LOCATION: LOCATION: ABDOMEN

## 2024-07-08 ASSESSMENT — PAIN SCALES - GENERAL
PAINLEVEL_OUTOF10: 7
PAINLEVEL_OUTOF10: 3
PAINLEVEL_OUTOF10: 0 - NO PAIN
PAINLEVEL_OUTOF10: 0 - NO PAIN
PAINLEVEL_OUTOF10: 3
PAINLEVEL_OUTOF10: 7

## 2024-07-08 ASSESSMENT — COGNITIVE AND FUNCTIONAL STATUS - GENERAL
MOBILITY SCORE: 24
DAILY ACTIVITIY SCORE: 24

## 2024-07-08 ASSESSMENT — PAIN - FUNCTIONAL ASSESSMENT
PAIN_FUNCTIONAL_ASSESSMENT: 0-10

## 2024-07-08 NOTE — CONSULTS
Infectious Disease Inpatient Consult    Inpatient consult to Infectious Diseases  Consult performed by: Hamlet Wolff MD  Consult ordered by: Jori Blanco MD        Referred by Dr Taurus Bernard MD: López Neil DO    Reason For Consult  Perf appy    History Of Present Illness  Jose Olivia is a 68 y.o. male presenting with past medical history of HTN, CAD, mild COPD, DM with diabetic peripheral neuropathy, hyperlipidemia, mild renal insufficiency and CVA 2018 (on aspirin and Plavix since that time) comes to the hospital with abdominal pain.  The patient states that his pain began yesterday morning while he was working on his water heater.  According the patient the pain moved from the middle of the abdomen more to the right.  He reported some nausea and vomiting.  He also reported some diarrhea.  He reported some chills and sweats but denies any chest pain or shortness of breath.  Initially the patient wanted to leave AMA to finish his water heater work but then later decided to stay for surgical intervention.  At this time the patient states his abdominal pain is improved and he is awaiting to go to the OR for surgery yesterday     Past Medical History  He has a past medical history of Diabetes mellitus (Multi), Other conditions influencing health status, Personal history of other diseases of the circulatory system (03/08/2017), Personal history of other diseases of the respiratory system (03/08/2017), and Personal history of other endocrine, nutritional and metabolic disease (03/08/2017).    Surgical History  He has a past surgical history that includes Prostate surgery (04/05/2016); Knee surgery (04/05/2016); Tonsillectomy (04/05/2016); MR angio neck wo IV contrast (5/18/2022); and MR angio head wo IV contrast (5/18/2022).     Social History     Occupational History    Not on file   Tobacco Use    Smoking status: Former     Types: Cigarettes    Smokeless tobacco: Never   Substance  "and Sexual Activity    Alcohol use: Not on file    Drug use: Not on file    Sexual activity: Not on file     Travel History   Travel since 06/08/24    No documented travel since 06/08/24            Family History  No family history on file.  Allergies  Patient has no known allergies.       There is no immunization history on file for this patient.  Medications  Home medications:  Medications Prior to Admission   Medication Sig Dispense Refill Last Dose    ascorbic acid, vitamin C, 500 mg capsule Take 1,000 mg by mouth 2 times a day.   7/6/2024    aspirin 81 mg chewable tablet Chew 1 tablet (81 mg) once daily.   7/6/2024    cetirizine (ZyrTEC) 10 mg tablet Take 1 tablet (10 mg) by mouth once daily.   7/6/2024    cholecalciferol (Vitamin D-3) 50 mcg (2,000 unit) capsule Take by mouth.   7/6/2024    clopidogrel (Plavix) 75 mg tablet Take 1 tablet (75 mg) by mouth once daily. 90 tablet 3 7/6/2024    cyanocobalamin (Vitamin B-12) 500 mcg tablet Take by mouth.   7/6/2024    ezetimibe (Zetia) 10 mg tablet Take 1 tablet (10 mg) by mouth once daily.   7/6/2024    insulin regular (NovoLIN R Regular U100 Insulin) 100 unit/mL injection injectable 3 times a day (before meals)Sliding scale coverage   7/6/2024    isosorbide mononitrate ER (Imdur) 30 mg 24 hr tablet Take 1 tablet (30 mg) by mouth once daily. 90 tablet 3 7/6/2024    losartan (Cozaar) 50 mg tablet Take 1 tablet (50 mg) by mouth once daily.   7/6/2024    multivitamin tablet Take by mouth.   7/6/2024    Ozempic 0.25 mg or 0.5 mg (2 mg/3 mL) pen injector    Past Week    Stiolto Respimat 2.5-2.5 mcg/actuation mist inhaler 2 puffs inhaled once a day   7/6/2024    vitamin B complex tablet Take 1 tablet by mouth once daily.   7/6/2024    blood sugar diagnostic, disc (Breeze 2 Test Strips) strip Dilma Breeze 2 Test DISK Quantity: 400 Refills: 0 Start : 24-Oct-2014 Active       Easy Touch Insulin Syringe 0.5 mL 31 gauge x 5/16\" syringe        insulin NPH, Isophane, (HumuLIN " N,NovoLIN N) 100 unit/mL injection Inject 25 Units under the skin 2 times a day before meals. Take as directed per insulin instructions.       magnesium glycinate 100 mg magnesium capsule Take 255 capsules (25,500 mg) by mouth once daily.       OneTouch Verio test strips strip use to take blood sugars at least 3-4 times per day        Current medications:  Scheduled medications  acetaminophen, 1,000 mg, intravenous, q8h  aspirin, 81 mg, oral, Daily  clopidogrel, 75 mg, oral, Daily  ezetimibe, 10 mg, oral, Daily  insulin lispro, 0-10 Units, subcutaneous, TID  isosorbide mononitrate ER, 30 mg, oral, Daily  losartan, 50 mg, oral, Daily  piperacillin-tazobactam, 3.375 g, intravenous, q6h      Continuous medications  lactated Ringer's, 75 mL/hr, Last Rate: 75 mL/hr (24 1537)      PRN medications  PRN medications: [Held by provider] acetaminophen, dextrose, dextrose, glucagon, glucagon, HYDROmorphone, ondansetron    Review of Systems     Objective  Range of Vitals (last 24 hours)  Heart Rate:  [53-76]   Temp:  [36.3 °C (97.3 °F)-37.1 °C (98.8 °F)]   Resp:  [14-20]   BP: (104-167)/(58-88)   Height:  [182.9 cm (6')]   Weight:  [95.5 kg (210 lb 8.6 oz)]   SpO2:  [94 %-100 %]   Daily Weight  24 : 95.5 kg (210 lb 8.6 oz)    Body mass index is 28.55 kg/m².     Physical Exam  /80 (BP Location: Left arm, Patient Position: Sitting)   Pulse 68   Temp 36.7 °C (98 °F) (Temporal)   Resp 18   Ht 1.829 m (6')   Wt 95.5 kg (210 lb 8.6 oz)   SpO2 96%   BMI 28.55 kg/m²   Temp (24hrs), Av.7 °C (98 °F), Min:36.3 °C (97.3 °F), Max:37.1 °C (98.8 °F)      General: alert, oriented, NAD  Lungs: bilaterally clear to auscultation  Heart: regular rate and rhythm  Abdomen: soft, + tender, non distended, BS+  Extremities: no edema  No rashes  No joint inflammation  Neck supple  Lines ok  No CVAT     Relevant Results    Labs  Results from last 72 hours   Lab Units 24  0428 24  0156   WBC AUTO x10*3/uL 16.6*  "21.3*   HEMOGLOBIN g/dL 15.2 16.4   HEMATOCRIT % 44.4 46.4   PLATELETS AUTO x10*3/uL 217 219   NEUTROS PCT AUTO % 91.0 95.1   LYMPHS PCT AUTO % 3.8 1.3   MONOS PCT AUTO % 4.5 2.8   EOS PCT AUTO % 0.0 0.0     Results from last 72 hours   Lab Units 07/08/24  0428 07/07/24  0156   SODIUM mmol/L 136 138   POTASSIUM mmol/L 4.6 4.1   CHLORIDE mmol/L 105 107   CO2 mmol/L 22 23   BUN mg/dL 32* 26*   CREATININE mg/dL 1.64* 1.67*   GLUCOSE mg/dL 158* 166*   CALCIUM mg/dL 8.9 9.4   ANION GAP mmol/L 14 12   EGFR mL/min/1.73m*2 45* 44*     Results from last 72 hours   Lab Units 07/07/24  0156   ALK PHOS U/L 68   BILIRUBIN TOTAL mg/dL 1.3*   PROTEIN TOTAL g/dL 6.4   ALT U/L 19   AST U/L 14   ALBUMIN g/dL 3.8     Estimated Creatinine Clearance: 51 mL/min (A) (by C-G formula based on SCr of 1.64 mg/dL (H)).  CRP   Date Value Ref Range Status   11/15/2019 9.28 (A) mg/dL Final     Comment:     REF VALUE  < 1.00       Sedimentation Rate   Date Value Ref Range Status   11/15/2019 >130 (H) 0 - 20 mm/h Final     No results found for: \"HIV1X2\", \"HIVCONF\", \"EBSQVG2WK\"  No results found for: \"HEPCABINIT\", \"HEPCAB\", \"HCVPCRQUANT\"  Microbiology  No results found for the last 100 days.          No lab exists for component: \"AGALPCRNB\"                        Imaging  CT abdomen pelvis w IV contrast    Result Date: 7/7/2024  Interpreted By:  Corina Farmer, STUDY: CT ABDOMEN PELVIS W IV CONTRAST;  7/7/2024 3:45 am   INDICATION: Signs/Symptoms:RLQ tenderness guarding.   COMPARISON: CT scan of the abdomen pelvis 09/19/2011   ACCESSION NUMBER(S): [id number]   ORDERING CLINICIAN: LIVAN FLORES   TECHNIQUE: Axial CT images of the abdomen and pelvis with coronal and sagittal reconstructed images obtained after intravenous administration of 75 mL of Omnipaque 350   FINDINGS: LOWER CHEST: Peripheral reticular opacities in the lung bases likely relate to chronic fibrotic changes. Calcified granulomas noted right lung base. Aortic root, mitral annular and " coronary artery calcifications noted.   ABDOMEN:   LIVER: Morphology. Hepatic steatosis. BILE DUCTS: Normal caliber. GALLBLADDER: No calcified gallstones. No wall thickening. PANCREAS: Mild fatty atrophy of the pancreas. SPLEEN: Within normal limits.  Accessory splenule noted. ADRENALS: Within normal limits. KIDNEYS and URETERS: Symmetric renal enhancement. No hydronephrosis or hydroureter. There is a 3 mm calculus in the lower pole of the right kidney. Subcentimeter hypodense foci bilaterally are too small to characterize. Bilateral perinephric stranding is nonspecific and could be age related.   VESSELS:  Calcific atherosclerosis of the aortoiliac and mesenteric vessels. No aortic aneurysm. RETROPERITONEUM: No pathologically enlarged retroperitoneal lymph nodes.   PELVIS:   REPRODUCTIVE ORGANS: Prostate gland is enlarged protruding into the base of the bladder. BLADDER: Bladder is moderately distended with mild wall thickening.   BOWEL: No dilated bowel. Appendix is dilated measuring up to 12 mm. There is an appendicolith of the base of the appendix. There is wall thickening and surrounding inflammatory changes consistent with acute appendicitis. No evidence for microperforation or abscess formation at this time. Mild bowel wall thickening involving several loops of small bowel in the right lower quadrant, likely reactive no pneumatosis or portal venous gas. A few scattered colonic diverticula without evidence for acute diverticulitis. PERITONEUM: No ascites or free air, no fluid collection.   ABDOMINAL WALL: Bilateral inguinal hernias containing fat. Small umbilical hernia contains fat. BONES: Multilevel degenerative changes of the spine.       Dilated appendix measuring 12 mm with periappendiceal inflammatory changes and wall thickening consistent with acute appendicitis. No evidence for microperforation or abscess formation. Surgical consultation is advised.   Mild bowel wall thickening involving several loops  of bowel in the right lower quadrant, likely reactive. Diverticulosis without evidence for acute diverticulitis.   Prostatomegaly with mild bladder wall thickening which may relate to under distention or sequela of chronic outlet obstruction. Correlate with urinalysis to exclude infectious cystitis.   Nonobstructing right renal calculus.   Additional findings as described above.   MACRO: None   Signed by: Corina Farmer 7/7/2024 3:59 AM Dictation workstation:   [id number]      Echo  No results found for this or any previous visit.    Assessment   Perf Appendicitis  Localized peritonitis  HELLEN on CKD 3  DM2   CAD  COPD  HTN    Plan   Stop zosyn  Start Ceftriaxone  Start Flagyl  Follow cultures  Check Bcx x 2 w fevers   Serial abd exams    I spent 35 minutes in the professional and overall care of this patient.      Hamlet Wolff MD

## 2024-07-08 NOTE — CARE PLAN
The patient's goals for the shift include      The clinical goals for the shift include tolerate clear liquids      Problem: Pain - Adult  Goal: Verbalizes/displays adequate comfort level or baseline comfort level  Outcome: Progressing     Problem: Safety - Adult  Goal: Free from fall injury  Outcome: Progressing     Problem: Discharge Planning  Goal: Discharge to home or other facility with appropriate resources  Outcome: Progressing     Problem: Chronic Conditions and Co-morbidities  Goal: Patient's chronic conditions and co-morbidity symptoms are monitored and maintained or improved  Outcome: Progressing     Problem: Diabetes  Goal: Achieve decreasing blood glucose levels by end of shift  Outcome: Progressing  Goal: Increase stability of blood glucose readings by end of shift  Outcome: Progressing  Goal: Decrease in ketones present in urine by end of shift  Outcome: Progressing  Goal: Maintain electrolyte levels within acceptable range throughout shift  Outcome: Progressing  Goal: Maintain glucose levels >70mg/dl to <250mg/dl throughout shift  Outcome: Progressing  Goal: No changes in neurological exam by end of shift  Outcome: Progressing  Goal: Learn about and adhere to nutrition recommendations by end of shift  Outcome: Progressing  Goal: Vital signs within normal range for age by end of shift  Outcome: Progressing  Goal: Increase self care and/or family involovement by end of shift  Outcome: Progressing  Goal: Receive DSME education by end of shift  Outcome: Progressing     Problem: Pain  Goal: Takes deep breaths with improved pain control throughout the shift  Outcome: Progressing  Goal: Turns in bed with improved pain control throughout the shift  Outcome: Progressing  Goal: Walks with improved pain control throughout the shift  Outcome: Progressing  Goal: Performs ADL's with improved pain control throughout shift  Outcome: Progressing  Goal: Participates in PT with improved pain control throughout the  shift  Outcome: Progressing  Goal: Free from opioid side effects throughout the shift  Outcome: Progressing  Goal: Free from acute confusion related to pain meds throughout the shift  Outcome: Progressing

## 2024-07-08 NOTE — PROGRESS NOTES
Social work consult placed for positive medical risk screen. SW reviewed pt's chart and communicated with TCC. Per chart review, pt at risk for alcohol misuse. SW met with pt to assess needs and provide support, introduced self and my role as  with care transition team. Explored pt's thoughts toward drinking behaviors, awareness, and willingness for tx. Pt stated that he does not drink 3-4 daily and when he does drink it is not a problem for him. Pt denied needing any SW resources. SW signing off; available upon request.    Bre Chiang, MSW, LSW ([id number])   Care Transitions

## 2024-07-08 NOTE — PROGRESS NOTES
Jose Olivia [id number]   Service: Internal Medicine / Hospitalist Date of service: 7/8/2024                           Full Code        Overnight Events: No new overnight events reported by patient or nursing.     Subjective      History Of Present Illness  Jose Olivia is a 68 y.o. male with past medical history of HTN, CAD, mild COPD, DM with diabetic peripheral neuropathy, hyperlipidemia, mild renal insufficiency and CVA 2018 (on aspirin and Plavix since that time) comes to the hospital with abdominal pain.  The patient states that his pain began yesterday morning while he was working on his water heater.  According the patient the pain moved from the middle of the abdomen more to the right.  He reported some nausea and vomiting.  He also reported some diarrhea.  He reported some chills and sweats but denies any chest pain or shortness of breath.  Initially the patient wanted to leave AMA to finish his water heater work but then later decided to stay for surgical intervention.  At this time the patient states his abdominal pain is improved and he is awaiting to go to the OR for surgery later today.        7/8.................No reported: Chest pains, dyspnea, orthopnea, palpitations, fevers or chills.Patient reported that overall he was doing well given recent surgery.  He endorsed some abdominal pain related to recent surgery.      Review of Systems:   Review of system otherwise negative if not aforementioned above in subjective.    Objective        Physical Exam     Constitutional:       Appearance: Patient appeared in no acute cardiopulmonary distress.     Comments: Patient alert and oriented to person place time and situation.  HEENT:      Head: Normocephalic and atraumatic.Trachea midline      Nose:No observed congestion or rhinorrhea.     Mouth/Throat: Mucous membranes Moist, Trachea appeared  midline.  Eyes:      Extraocular Movements: Extraocular movements intact.      Pupils: Pupils are equal, round,  and reactive to light.      Comments: No scleral icterus or conjunctival injection appreciated.   Cardiovascular:      Rate and Rhythm: Normal rate and regular rhythm. No clicks rubs or gallops, normal S1 and S2.No peripheral stigmata of endocarditis appreciated.     Pulmonary:      Lungs appeared clear to auscultation, no adventitious sound appreciated.  Abdominal:      General: Status post surgical intervention, abdomen nondistended, hypoactive bowel sounds, no involuntary guarding or rebound tenderness appreciated.     Comments: None   Musculoskeletal:       Patient appeared to have full active range of motion for upper and lower extremities, no acute apparent joint deformity appreciated on examination.   No pitting edema or cyanosis appreciated.       Lymphadenopathy:      No appreciable palpable lymphadenopathy  Skin:     General: Skin is warm.      Coloration:  No jaundice     Findings: No Pandey Banks's or [place] signs appreciated.  No appreciation of erythema or signs of infectious process related to surgical site.  Neurological:      General: No focal sensory or motor deficits appreciated, no meningeal signs or dysmetria noted.      Cranial Nerves: Cranial nerves II to XII appearing grossly intact.     Genitals:  Deferred  Psychiatric:         The patient appears to be displaying normal mood and affect at the time of evaluation.    Labs:     Lab Results   Component Value Date    GLUCOSE 158 (H) 07/08/2024    CALCIUM 8.9 07/08/2024     07/08/2024    K 4.6 07/08/2024    CO2 22 07/08/2024     07/08/2024    BUN 32 (H) 07/08/2024    CREATININE 1.64 (H) 07/08/2024      Lab Results   Component Value Date    WBC 16.6 (H) 07/08/2024    HGB 15.2 07/08/2024    HCT 44.4 07/08/2024    MCV 98 07/08/2024     07/08/2024      [unfilled]   [unfilled]   No results found for the last 90 days.              X-rays/ Images    [unfilled]   Radiology Results (last 21 days)    Procedure Component Value  Units Date/Time   CT abdomen pelvis w IV contrast [[id number]] Collected: 07/07/24 0400   Order Status: Completed Updated: 07/07/24 0400   Narrative:     Interpreted By:  Corina Farmer,  STUDY:  CT ABDOMEN PELVIS W IV CONTRAST;  7/7/2024 3:45 am      INDICATION:  Signs/Symptoms:RLQ tenderness guarding.      COMPARISON:  CT scan of the abdomen pelvis 09/19/2011      ACCESSION NUMBER(S):  [id number]      ORDERING CLINICIAN:  LIVAN FLORES      TECHNIQUE:  Axial CT images of the abdomen and pelvis with coronal and sagittal  reconstructed images obtained after intravenous administration of 75  mL of Omnipaque 350      FINDINGS:  LOWER CHEST: Peripheral reticular opacities in the lung bases likely  relate to chronic fibrotic changes. Calcified granulomas noted right  lung base. Aortic root, mitral annular and coronary artery  calcifications noted.      ABDOMEN:      LIVER: Morphology. Hepatic steatosis.  BILE DUCTS: Normal caliber.  GALLBLADDER: No calcified gallstones. No wall thickening.  PANCREAS: Mild fatty atrophy of the pancreas.  SPLEEN: Within normal limits.  Accessory splenule noted.  ADRENALS: Within normal limits.  KIDNEYS and URETERS: Symmetric renal enhancement. No hydronephrosis  or hydroureter. There is a 3 mm calculus in the lower pole of the  right kidney. Subcentimeter hypodense foci bilaterally are too small  to characterize. Bilateral perinephric stranding is nonspecific and  could be age related.      VESSELS:  Calcific atherosclerosis of the aortoiliac and mesenteric  vessels. No aortic aneurysm. RETROPERITONEUM: No pathologically  enlarged retroperitoneal lymph nodes.      PELVIS:      REPRODUCTIVE ORGANS: Prostate gland is enlarged protruding into the  base of the bladder. BLADDER: Bladder is moderately distended with  mild wall thickening.      BOWEL: No dilated bowel. Appendix is dilated measuring up to 12 mm.  There is an appendicolith of the base of the appendix. There is wall  thickening and  surrounding inflammatory changes consistent with acute  appendicitis. No evidence for microperforation or abscess formation  at this time. Mild bowel wall thickening involving several loops of  small bowel in the right lower quadrant, likely reactive no  pneumatosis or portal venous gas. A few scattered colonic diverticula  without evidence for acute diverticulitis. PERITONEUM: No ascites or  free air, no fluid collection.      ABDOMINAL WALL: Bilateral inguinal hernias containing fat. Small  umbilical hernia contains fat. BONES: Multilevel degenerative changes  of the spine.       Impression:     Dilated appendix measuring 12 mm with periappendiceal inflammatory  changes and wall thickening consistent with acute appendicitis. No  evidence for microperforation or abscess formation. Surgical  consultation is advised.      Mild bowel wall thickening involving several loops of bowel in the  right lower quadrant, likely reactive. Diverticulosis without  evidence for acute diverticulitis.      Prostatomegaly with mild bladder wall thickening which may relate to  under distention or sequela of chronic outlet obstruction. Correlate  with urinalysis to exclude infectious cystitis.      Nonobstructing right renal calculus.      Additional findings as described above.      MACRO:  None      Signed by: Corina Farmer 7/7/2024 3:59 AM  Dictation workstation:   [id number]             Medical Problems       Problem List       * (Principal) Acute appendicitis with localized peritonitis, without perforation, abscess, or gangrene    Abnormal EKG    Abnormal stress test    Acute carpal tunnel syndrome, right    Acute sinusitis    Allergic rhinitis    Benign essential hypertension    Bilateral tinnitus    CAD (coronary artery disease)    Cervical vertigo    Chronic cough    Chronic glossitis    Claudication, intermittent (CMS-HCC)    Contact with and (suspected) exposure to covid-19    COPD, mild (Multi)    Mild chronic obstructive  pulmonary disease (Multi)    Diabetes mellitus (Multi)    Diabetic peripheral neuropathy (Multi)    Disease suspected    Dizziness    Dry skin    Elevated serum creatinine    Fatigue    Hemiplegia (Multi)    History of elevated lipids    History of hypertension    HLD (hyperlipidemia)    Hyperkalemia    Hypogonadism in male    Imbalance    Insomnia    Left hemiparesis (Multi)    Low testosterone in male    Mild renal insufficiency    Nasal valve blockage    Numbness    Obstructive sleep apnea syndrome    Onychomycosis    Spondylosis of cervical spine    Osteoarthritis of spine with radiculopathy, cervical region    Left knee pain    Pain and swelling of lower leg, left    Pain of foot    Right hand paresthesia    Right hand weakness    Shortness of breath    Stroke (Multi)    Tinea corporis    Ulnar neuropathy at elbow, right               Above medical problems may be reflective of historical medical problems that may have resolved and may not related to acute clinical condition/medical problems.    Clinical impression/plan:    Principal Problem:    Acute appendicitis with localized peritonitis, without perforation, abscess, or gangrene        Acute appendicitis  -Finding on CAT scan concerning for acute appendicitis.  Surgery has been consulted plan 4 OR today.  -Leukocytosis secondary appendicitis.  Continue Zosyn.     HELLEN with underlying CKD  -Creatinine 1.67 on admission.  Continue IV fluid hydration  -Avoid nephrotoxins check renal functions in AM.     Diabetes mellitus  -Continue sliding scale insulin last A1c is from 2022 shows A1c of 6.5  -Recheck A1c     Coronary artery disease  -Continue with aspirin and Plavix postoperatively once okay with surgery.     Hypertension  -Holding losartan in setting of HELLEN resume once renal functions improved.     COPD  -Currently stable    Disposition/additional care plan/interventions: 7/8/2024    Transition to clear liquid diet as per general surgery continue monitor  clinical response.    Transition to gram-negative coverage with Rocephin and anaerobic coverage and Flagyl as per infectious disease, continue stewardship as per ID    Monitor fever curve    Monitor leukocytosis.    Zosyn................. stop.,  Continue to monitor renal function.    BMP and cbc in am.      The patient was informed of differential diagnosis , work up , plan of care and possible sequelae of clinical disposition.Patient in agreement with plan of care. Further recommendations forthcoming in accordance with patient's clinical disposition and response to care.    Discharge planning:Discharge timing to be determined, in accordance recommendation as well.  From her surgical service.    Care time: > 55  mins           Dictation performed with assistance of voice recognition device therefore transcription errors are possible.

## 2024-07-08 NOTE — PROGRESS NOTES
[facility]  GENERAL SURGERY - PROGRESS NOTE    Patient Name: Jose Olivia  MRN: [id number]  Admit Date: 707  : 1955  AGE: 68 y.o.   GENDER: male    CHIEF COMPLAINT / EVENTS LAST 24HRS / HPI:  Patient taken to OR during day, NAEO    MEDICAL HISTORY / ROS:   Admission history and ROS reviewed. Pertinent changes as follows:      Review of Systems   Constitutional:   Negative for fever, chills, weight loss.   HENT:  Negative for congestion and dental problem.    Eyes:  Negative for discharge and itching.   Respiratory: . Negative for apnea, choking and wheezing.    Cardiovascular:  Negative for palpitations and leg swelling.   Gastrointestinal:  negative for n/v  Endocrine: Negative for cold intolerance and heat intolerance.   Musculoskeletal:  Negative for neck pain.   Skin:  Negative for color change.   Neurological:  Negative for tingling, loss of consciousness and numbness.   Psychiatric/Behavioral:  Negative for agitation and behavioral problems.      PHYSICAL EXAM:  Heart Rate:  [53-76]   Temp:  [36.3 °C (97.3 °F)-37.1 °C (98.8 °F)]   Resp:  [14-20]   BP: (104-167)/(58-88)   Height:  [182.9 cm (6')]   Weight:  [95.5 kg (210 lb 8.6 oz)]   SpO2:  [94 %-100 %]   Physical Exam  Constitutional:       Appearance: Normal appearance.   HENT:      Head: Normocephalic.      Mouth/Throat:      Mouth: Mucous membranes are moist.   Eyes:      Pupils: Pupils are equal, round, and reactive to light.   Cardiovascular:      Rate and Rhythm: Normal rate.   Abdominal:      General: Abdomen is flat.      Comments: Laparoscopic incisions with dressings in place, appropriately tender to palpation, soft no rebound or guarding.   Musculoskeletal:         General: Normal range of motion.   Skin:     General: Skin is warm.      Capillary Refill: Capillary refill takes less than 2 seconds.   Neurological:      Mental Status: He is alert.   Psychiatric:         Mood and Affect: Mood normal.              I have reviewed all medications, laboratory results, and imaging pertinent for today's encounter.    ==============================================================================  TODAY'S ASSESSMENT AND PLAN OF CARE:  Patient is a 68-year-old male who presented with acute appendicitis now status post laparoscopic appendectomy found to have perforated appendicitis IntraOp.    Plan:  -CLD  -Oral pain meds  -IV abx for 4 days total through 07/10  -OOB  -IS    Patient will be discussed with Attending Physician Dr. Jazlyn Chan PGY4  General Surgery Service   ==============================================================================

## 2024-07-08 NOTE — PROGRESS NOTES
07/08/24 1110   Discharge Planning   Living Arrangements Alone   Support Systems Family members;Children   Assistance Needed none   Type of Residence Private residence   Home or Post Acute Services None   Patient expects to be discharged to: Home   Does the patient need discharge transport arranged? No     Discharge assessment complete. Patient was alert and oriented. Patient lives alone at home. Patient confirmed PCP as Dr. Neil. Patient is completely independent at home and declined any needs upon discharge. Patient has no transportation or home going concerns. Patient is aware of Care Transitions team if any needs should arise.

## 2024-07-08 NOTE — ANESTHESIA POSTPROCEDURE EVALUATION
Patient: Jose Olivia    Procedure Summary       Date: 07/07/24 Room / Location: P[facility] OR 01 / Virtual [facility] OR    Anesthesia Start: 1224 Anesthesia Stop: 1411    Procedure: Appendectomy Laparoscopy WITH UMBILICAL HERNIA REPAIR. Diagnosis:       Acute appendicitis with localized peritonitis, without perforation, abscess, or gangrene      (Acute appendicitis with localized peritonitis, without perforation, abscess, or gangrene [K35.30])    Surgeons: Gilles Hobson MD Responsible Provider: FLAKITO Nguyen    Anesthesia Type: general ASA Status: 3            Anesthesia Type: general    Vitals Value Taken Time   /69 07/07/24 1501   Temp 36.6 °C (97.8 °F) 07/07/24 1500   Pulse 61 07/07/24 1508   Resp 53 07/07/24 1509   SpO2 97 % 07/07/24 1509   Vitals shown include unfiled device data.    Anesthesia Post Evaluation    Patient location during evaluation: PACU  Patient participation: complete - patient participated  Level of consciousness: awake  Pain score: 3  Pain management: adequate  Airway patency: patent  Cardiovascular status: acceptable  Respiratory status: acceptable  Hydration status: acceptable  Postoperative Nausea and Vomiting: mild        There were no known notable events for this encounter.

## 2024-07-09 LAB
ANION GAP SERPL CALC-SCNC: 9 MMOL/L (ref 10–20)
B-LACTAMASE ORGANISM ISLT: POSITIVE
BACTERIA SPEC CULT: ABNORMAL
BACTERIA SPEC CULT: ABNORMAL
BUN SERPL-MCNC: 29 MG/DL (ref 6–23)
CALCIUM SERPL-MCNC: 8.2 MG/DL (ref 8.6–10.3)
CHLORIDE SERPL-SCNC: 105 MMOL/L (ref 98–107)
CO2 SERPL-SCNC: 26 MMOL/L (ref 21–32)
CREAT SERPL-MCNC: 1.53 MG/DL (ref 0.5–1.3)
EGFRCR SERPLBLD CKD-EPI 2021: 49 ML/MIN/1.73M*2
ERYTHROCYTE [DISTWIDTH] IN BLOOD BY AUTOMATED COUNT: 13 % (ref 11.5–14.5)
GLUCOSE BLD MANUAL STRIP-MCNC: 107 MG/DL (ref 74–99)
GLUCOSE BLD MANUAL STRIP-MCNC: 147 MG/DL (ref 74–99)
GLUCOSE BLD MANUAL STRIP-MCNC: 166 MG/DL (ref 74–99)
GLUCOSE BLD MANUAL STRIP-MCNC: 98 MG/DL (ref 74–99)
GLUCOSE SERPL-MCNC: 105 MG/DL (ref 74–99)
GRAM STN SPEC: ABNORMAL
GRAM STN SPEC: ABNORMAL
HCT VFR BLD AUTO: 40.9 % (ref 41–52)
HGB BLD-MCNC: 13.6 G/DL (ref 13.5–17.5)
MCH RBC QN AUTO: 32.7 PG (ref 26–34)
MCHC RBC AUTO-ENTMCNC: 33.3 G/DL (ref 32–36)
MCV RBC AUTO: 98 FL (ref 80–100)
NRBC BLD-RTO: 0 /100 WBCS (ref 0–0)
PLATELET # BLD AUTO: 182 X10*3/UL (ref 150–450)
POTASSIUM SERPL-SCNC: 4.1 MMOL/L (ref 3.5–5.3)
RBC # BLD AUTO: 4.16 X10*6/UL (ref 4.5–5.9)
SODIUM SERPL-SCNC: 136 MMOL/L (ref 136–145)
WBC # BLD AUTO: 12 X10*3/UL (ref 4.4–11.3)

## 2024-07-09 PROCEDURE — 82947 ASSAY GLUCOSE BLOOD QUANT: CPT

## 2024-07-09 PROCEDURE — 80048 BASIC METABOLIC PNL TOTAL CA: CPT | Performed by: HOSPITALIST

## 2024-07-09 PROCEDURE — 36415 COLL VENOUS BLD VENIPUNCTURE: CPT | Performed by: HOSPITALIST

## 2024-07-09 PROCEDURE — 2500000001 HC RX 250 WO HCPCS SELF ADMINISTERED DRUGS (ALT 637 FOR MEDICARE OP): Performed by: INTERNAL MEDICINE

## 2024-07-09 PROCEDURE — 2500000001 HC RX 250 WO HCPCS SELF ADMINISTERED DRUGS (ALT 637 FOR MEDICARE OP): Performed by: STUDENT IN AN ORGANIZED HEALTH CARE EDUCATION/TRAINING PROGRAM

## 2024-07-09 PROCEDURE — 2060000001 HC INTERMEDIATE ICU ROOM DAILY

## 2024-07-09 PROCEDURE — 2500000004 HC RX 250 GENERAL PHARMACY W/ HCPCS (ALT 636 FOR OP/ED): Performed by: SURGERY

## 2024-07-09 PROCEDURE — 2500000002 HC RX 250 W HCPCS SELF ADMINISTERED DRUGS (ALT 637 FOR MEDICARE OP, ALT 636 FOR OP/ED): Performed by: SURGERY

## 2024-07-09 PROCEDURE — 2500000004 HC RX 250 GENERAL PHARMACY W/ HCPCS (ALT 636 FOR OP/ED): Performed by: INTERNAL MEDICINE

## 2024-07-09 PROCEDURE — 99232 SBSQ HOSP IP/OBS MODERATE 35: CPT | Performed by: HOSPITALIST

## 2024-07-09 PROCEDURE — 85027 COMPLETE CBC AUTOMATED: CPT | Performed by: HOSPITALIST

## 2024-07-09 PROCEDURE — 2500000001 HC RX 250 WO HCPCS SELF ADMINISTERED DRUGS (ALT 637 FOR MEDICARE OP): Performed by: SURGERY

## 2024-07-09 RX ADMIN — INSULIN LISPRO 2 UNITS: 100 INJECTION, SOLUTION INTRAVENOUS; SUBCUTANEOUS at 16:35

## 2024-07-09 RX ADMIN — EZETIMIBE 10 MG: 10 TABLET ORAL at 08:03

## 2024-07-09 RX ADMIN — ISOSORBIDE MONONITRATE 30 MG: 30 TABLET, EXTENDED RELEASE ORAL at 08:03

## 2024-07-09 RX ADMIN — CLOPIDOGREL 75 MG: 75 TABLET ORAL at 08:03

## 2024-07-09 RX ADMIN — ASPIRIN 81 MG CHEWABLE TABLET 81 MG: 81 TABLET CHEWABLE at 08:03

## 2024-07-09 RX ADMIN — OXYCODONE HYDROCHLORIDE 10 MG: 10 TABLET ORAL at 16:34

## 2024-07-09 RX ADMIN — OXYCODONE HYDROCHLORIDE 10 MG: 10 TABLET ORAL at 06:24

## 2024-07-09 RX ADMIN — ACETAMINOPHEN 1000 MG: 10 INJECTION INTRAVENOUS at 21:24

## 2024-07-09 RX ADMIN — METRONIDAZOLE 500 MG: 500 INJECTION, SOLUTION INTRAVENOUS at 14:10

## 2024-07-09 RX ADMIN — CEFTRIAXONE SODIUM 2 G: 2 INJECTION, SOLUTION INTRAVENOUS at 08:03

## 2024-07-09 RX ADMIN — METRONIDAZOLE 500 MG: 500 INJECTION, SOLUTION INTRAVENOUS at 06:47

## 2024-07-09 RX ADMIN — METRONIDAZOLE 500 MG: 500 INJECTION, SOLUTION INTRAVENOUS at 23:43

## 2024-07-09 RX ADMIN — ACETAMINOPHEN 1000 MG: 10 INJECTION INTRAVENOUS at 06:24

## 2024-07-09 RX ADMIN — LOSARTAN POTASSIUM 50 MG: 50 TABLET, FILM COATED ORAL at 21:24

## 2024-07-09 ASSESSMENT — PAIN SCALES - GENERAL
PAINLEVEL_OUTOF10: 0 - NO PAIN
PAINLEVEL_OUTOF10: 0 - NO PAIN
PAINLEVEL_OUTOF10: 6
PAINLEVEL_OUTOF10: 0 - NO PAIN
PAINLEVEL_OUTOF10: 7
PAINLEVEL_OUTOF10: 0 - NO PAIN
PAINLEVEL_OUTOF10: 3

## 2024-07-09 ASSESSMENT — COGNITIVE AND FUNCTIONAL STATUS - GENERAL
DAILY ACTIVITIY SCORE: 24
MOBILITY SCORE: 24

## 2024-07-09 ASSESSMENT — PAIN - FUNCTIONAL ASSESSMENT
PAIN_FUNCTIONAL_ASSESSMENT: 0-10

## 2024-07-09 ASSESSMENT — PAIN DESCRIPTION - LOCATION
LOCATION: ABDOMEN
LOCATION: ABDOMEN

## 2024-07-09 ASSESSMENT — PAIN SCALES - PAIN ASSESSMENT IN ADVANCED DEMENTIA (PAINAD): TOTALSCORE: MEDICATION (SEE MAR)

## 2024-07-09 NOTE — PROGRESS NOTES
Jose Olivia [id number]   Service: Internal Medicine / Hospitalist Date of service: 7/8/2024                                   Full Code         Overnight Events: No new overnight events reported by patient or nursing.      Subjective        History Of Present Illness  Jose Olivia is a 68 y.o. male with past medical history of HTN, CAD, mild COPD, DM with diabetic peripheral neuropathy, hyperlipidemia, mild renal insufficiency and CVA 2018 (on aspirin and Plavix since that time) comes to the hospital with abdominal pain.  The patient states that his pain began yesterday morning while he was working on his water heater.  According the patient the pain moved from the middle of the abdomen more to the right.  He reported some nausea and vomiting.  He also reported some diarrhea.  He reported some chills and sweats but denies any chest pain or shortness of breath.  Initially the patient wanted to leave AMA to finish his water heater work but then later decided to stay for surgical intervention.  At this time the patient states his abdominal pain is improved and he is awaiting to go to the OR for surgery later today.         7/8.................No reported: Chest pains, dyspnea, orthopnea, palpitations, fevers or chills.Patient reported that overall he was doing well given recent surgery.  He endorsed some abdominal pain related to recent surgery.    7/9..............The patient reported feeling better today no reported : nausea, vomiting, chest pains or palpitations.The patient felt that his postop related abdominal pain was tolerable.        Review of Systems:   Review of system otherwise negative if not aforementioned above in subjective.     Objective           Physical Exam      Constitutional:       Appearance: Patient appeared in no acute cardiopulmonary distress.     Comments: Patient alert and oriented to person place time and situation.  HEENT:      Head: Normocephalic and atraumatic.Trachea midline       Nose:No observed congestion or rhinorrhea.     Mouth/Throat: Mucous membranes Moist, Trachea appeared  midline.  Eyes:      Extraocular Movements: Extraocular movements intact.      Pupils: Pupils are equal, round, and reactive to light.      Comments: No scleral icterus or conjunctival injection appreciated.   Cardiovascular:      Rate and Rhythm: Normal rate and regular rhythm. No clicks rubs or gallops, normal S1 and S2.No peripheral stigmata of endocarditis appreciated.     Pulmonary:      Lungs appeared clear to auscultation, no adventitious sound appreciated.  Abdominal:      General: Status post surgical intervention, abdomen nondistended, hypoactive bowel sounds, no involuntary guarding or rebound tenderness appreciated.     Comments: None   Musculoskeletal:       Patient appeared to have full active range of motion for upper and lower extremities, no acute apparent joint deformity appreciated on examination.   No pitting edema or cyanosis appreciated.       Lymphadenopathy:      No appreciable palpable lymphadenopathy  Skin:     General: Skin is warm.      Coloration:  No jaundice     Findings: No Pandey Banks's or [place] signs appreciated.  No appreciation of erythema or signs of infectious process related to surgical site/Laparoscopic incisions,dressing removed today..  Neurological:      General: No focal sensory or motor deficits appreciated, no meningeal signs or dysmetria noted.      Cranial Nerves: Cranial nerves II to XII appearing grossly intact.     Genitals:  Deferred  Psychiatric:         The patient appears to be displaying normal mood and affect at the time of evaluation.     Labs:           Lab Results   Component Value Date     GLUCOSE 158 (H) 07/08/2024     CALCIUM 8.9 07/08/2024      07/08/2024     K 4.6 07/08/2024     CO2 22 07/08/2024      07/08/2024     BUN 32 (H) 07/08/2024     CREATININE 1.64 (H) 07/08/2024            Lab Results   Component Value Date     WBC 16.6 (H)  07/08/2024     HGB 15.2 07/08/2024     HCT 44.4 07/08/2024     MCV 98 07/08/2024      07/08/2024      [unfilled]   [unfilled]   No results found for the last 90 days.                  X-rays/ Images     [unfilled]   Radiology Results (last 21 days)     Procedure Component Value Units Date/Time   CT abdomen pelvis w IV contrast [[id number]] Collected: 07/07/24 0400   Order Status: Completed Updated: 07/07/24 0400   Narrative:     Interpreted By:  Corina Farmer,  STUDY:  CT ABDOMEN PELVIS W IV CONTRAST;  7/7/2024 3:45 am      INDICATION:  Signs/Symptoms:RLQ tenderness guarding.      COMPARISON:  CT scan of the abdomen pelvis 09/19/2011      ACCESSION NUMBER(S):  [id number]      ORDERING CLINICIAN:  LIVAN FLORES      TECHNIQUE:  Axial CT images of the abdomen and pelvis with coronal and sagittal  reconstructed images obtained after intravenous administration of 75  mL of Omnipaque 350      FINDINGS:  LOWER CHEST: Peripheral reticular opacities in the lung bases likely  relate to chronic fibrotic changes. Calcified granulomas noted right  lung base. Aortic root, mitral annular and coronary artery  calcifications noted.      ABDOMEN:      LIVER: Morphology. Hepatic steatosis.  BILE DUCTS: Normal caliber.  GALLBLADDER: No calcified gallstones. No wall thickening.  PANCREAS: Mild fatty atrophy of the pancreas.  SPLEEN: Within normal limits.  Accessory splenule noted.  ADRENALS: Within normal limits.  KIDNEYS and URETERS: Symmetric renal enhancement. No hydronephrosis  or hydroureter. There is a 3 mm calculus in the lower pole of the  right kidney. Subcentimeter hypodense foci bilaterally are too small  to characterize. Bilateral perinephric stranding is nonspecific and  could be age related.      VESSELS:  Calcific atherosclerosis of the aortoiliac and mesenteric  vessels. No aortic aneurysm. RETROPERITONEUM: No pathologically  enlarged retroperitoneal lymph nodes.      PELVIS:      REPRODUCTIVE ORGANS:  Prostate gland is enlarged protruding into the  base of the bladder. BLADDER: Bladder is moderately distended with  mild wall thickening.      BOWEL: No dilated bowel. Appendix is dilated measuring up to 12 mm.  There is an appendicolith of the base of the appendix. There is wall  thickening and surrounding inflammatory changes consistent with acute  appendicitis. No evidence for microperforation or abscess formation  at this time. Mild bowel wall thickening involving several loops of  small bowel in the right lower quadrant, likely reactive no  pneumatosis or portal venous gas. A few scattered colonic diverticula  without evidence for acute diverticulitis. PERITONEUM: No ascites or  free air, no fluid collection.      ABDOMINAL WALL: Bilateral inguinal hernias containing fat. Small  umbilical hernia contains fat. BONES: Multilevel degenerative changes  of the spine.       Impression:     Dilated appendix measuring 12 mm with periappendiceal inflammatory  changes and wall thickening consistent with acute appendicitis. No  evidence for microperforation or abscess formation. Surgical  consultation is advised.      Mild bowel wall thickening involving several loops of bowel in the  right lower quadrant, likely reactive. Diverticulosis without  evidence for acute diverticulitis.      Prostatomegaly with mild bladder wall thickening which may relate to  under distention or sequela of chronic outlet obstruction. Correlate  with urinalysis to exclude infectious cystitis.      Nonobstructing right renal calculus.      Additional findings as described above.      MACRO:  None      Signed by: Corina Farmer 7/7/2024 3:59 AM  Dictation workstation:   [id number]                  Medical Problems         Problem List         * (Principal) Acute appendicitis with localized peritonitis, without perforation, abscess, or gangrene     Abnormal EKG     Abnormal stress test     Acute carpal tunnel syndrome, right     Acute sinusitis      Allergic rhinitis     Benign essential hypertension     Bilateral tinnitus     CAD (coronary artery disease)     Cervical vertigo     Chronic cough     Chronic glossitis     Claudication, intermittent (CMS-HCC)     Contact with and (suspected) exposure to covid-19     COPD, mild (Multi)     Mild chronic obstructive pulmonary disease (Multi)     Diabetes mellitus (Multi)     Diabetic peripheral neuropathy (Multi)     Disease suspected     Dizziness     Dry skin     Elevated serum creatinine     Fatigue     Hemiplegia (Multi)     History of elevated lipids     History of hypertension     HLD (hyperlipidemia)     Hyperkalemia     Hypogonadism in male     Imbalance     Insomnia     Left hemiparesis (Multi)     Low testosterone in male     Mild renal insufficiency     Nasal valve blockage     Numbness     Obstructive sleep apnea syndrome     Onychomycosis     Spondylosis of cervical spine     Osteoarthritis of spine with radiculopathy, cervical region     Left knee pain     Pain and swelling of lower leg, left     Pain of foot     Right hand paresthesia     Right hand weakness     Shortness of breath     Stroke (Multi)     Tinea corporis     Ulnar neuropathy at elbow, right                  Above medical problems may be reflective of historical medical problems that may have resolved and may not related to acute clinical condition/medical problems.     Clinical impression/plan:     Principal Problem:    Acute appendicitis with localized peritonitis, without perforation, abscess, or gangrene        Acute appendicitis, complicated by localized peritonitis status post appendectomy   -Finding on CAT scan concerning for acute appendicitis.  Surgery has been consulted plan 4 OR today.  -Leukocytosis secondary appendicitis.  Continue Zosyn.     HELLEN with underlying CKD  -Creatinine 1.67 on admission.  Continue IV fluid hydration  -Avoid nephrotoxins check renal functions in AM.     Diabetes mellitus  -Continue sliding scale  insulin last A1c is from 2022 shows A1c of 6.5  -Recheck A1c     Coronary artery disease  -Continue with aspirin and Plavix postoperatively once okay with surgery.     Hypertension  -Holding losartan in setting of HELLEN resume once renal functions improved.     COPD  -Currently stable     Disposition/additional care plan/interventions: 7/9/2024     Transitioned to gram-negative coverage with Rocephin and anaerobic coverage and Flagyl as per infectious disease, continue stewardship as per ID....................Duration of antibiotics as per ID.    Possible transition to oral therapy to cover anaerobic and gram-negative pathogen at the time of discharge, will defer to ID.     Monitor fever curve     Monitor leukocytosis.       The patient was informed of differential diagnosis , work up , plan of care and possible sequelae of clinical disposition.Patient in agreement with plan of care. Further recommendations forthcoming in accordance with patient's clinical disposition and response to care.     Discharge planning:Discharge timing to be determined, in accordance recommendation as well.  From her surgical service.     Care time: < 55  mins              Dictation performed with assistance of voice recognition device therefore transcription errors are possible.

## 2024-07-09 NOTE — CARE PLAN
The patient's goals for the shift include      The clinical goals for the shift include pt will remain hemodynamically stable throughout the shift      Problem: Pain - Adult  Goal: Verbalizes/displays adequate comfort level or baseline comfort level  Outcome: Progressing     Problem: Safety - Adult  Goal: Free from fall injury  Outcome: Progressing     Problem: Discharge Planning  Goal: Discharge to home or other facility with appropriate resources  Outcome: Progressing     Problem: Chronic Conditions and Co-morbidities  Goal: Patient's chronic conditions and co-morbidity symptoms are monitored and maintained or improved  Outcome: Progressing     Problem: Diabetes  Goal: Achieve decreasing blood glucose levels by end of shift  Outcome: Progressing  Goal: Increase stability of blood glucose readings by end of shift  Outcome: Progressing  Goal: Decrease in ketones present in urine by end of shift  Outcome: Progressing  Goal: Maintain electrolyte levels within acceptable range throughout shift  Outcome: Progressing  Goal: Maintain glucose levels >70mg/dl to <250mg/dl throughout shift  Outcome: Progressing  Goal: No changes in neurological exam by end of shift  Outcome: Progressing  Goal: Learn about and adhere to nutrition recommendations by end of shift  Outcome: Progressing  Goal: Vital signs within normal range for age by end of shift  Outcome: Progressing  Goal: Increase self care and/or family involovement by end of shift  Outcome: Progressing  Goal: Receive DSME education by end of shift  Outcome: Progressing     Problem: Pain  Goal: Takes deep breaths with improved pain control throughout the shift  Outcome: Progressing  Goal: Turns in bed with improved pain control throughout the shift  Outcome: Progressing  Goal: Walks with improved pain control throughout the shift  Outcome: Progressing  Goal: Performs ADL's with improved pain control throughout shift  Outcome: Progressing  Goal: Participates in PT with  improved pain control throughout the shift  Outcome: Progressing  Goal: Free from opioid side effects throughout the shift  Outcome: Progressing  Goal: Free from acute confusion related to pain meds throughout the shift  Outcome: Progressing

## 2024-07-09 NOTE — PROGRESS NOTES
[facility]  GENERAL SURGERY - PROGRESS NOTE    Patient Name: Jose Olivia  MRN: [id number]  Admit Date: 707  : 1955  AGE: 68 y.o.   GENDER: male    CHIEF COMPLAINT / EVENTS LAST 24HRS / HPI:  NAEO, tolerating CLD, no BMs    MEDICAL HISTORY / ROS:   Admission history and ROS reviewed. Pertinent changes as follows:      Review of Systems   Constitutional:   Negative for fever, chills, weight loss.   HENT:  Negative for congestion and dental problem.    Eyes:  Negative for discharge and itching.   Respiratory: . Negative for apnea, choking and wheezing.    Cardiovascular:  Negative for palpitations and leg swelling.   Gastrointestinal:  negative for n/v  Endocrine: Negative for cold intolerance and heat intolerance.   Musculoskeletal:  Negative for neck pain.   Skin:  Negative for color change.   Neurological:  Negative for tingling, loss of consciousness and numbness.   Psychiatric/Behavioral:  Negative for agitation and behavioral problems.      PHYSICAL EXAM:  Heart Rate:  [57-64]   Temp:  [36.5 °C (97.7 °F)-37.3 °C (99.1 °F)]   Resp:  [18-20]   BP: (121-147)/(63-77)   Weight:  [94.9 kg (209 lb 3.5 oz)]   SpO2:  [93 %-97 %]   Physical Exam  Constitutional:       Appearance: Normal appearance.   HENT:      Head: Normocephalic.      Mouth/Throat:      Mouth: Mucous membranes are moist.   Eyes:      Pupils: Pupils are equal, round, and reactive to light.   Cardiovascular:      Rate and Rhythm: Normal rate.   Abdominal:      General: Abdomen is flat.      Comments: Laparoscopic incisions with dressings in place, appropriately tender to palpation, soft no rebound or guarding.   Musculoskeletal:         General: Normal range of motion.   Skin:     General: Skin is warm.      Capillary Refill: Capillary refill takes less than 2 seconds.   Neurological:      Mental Status: He is alert.   Psychiatric:         Mood and Affect: Mood normal.             I have reviewed all medications,  laboratory results, and imaging pertinent for today's encounter.    ==============================================================================  TODAY'S ASSESSMENT AND PLAN OF CARE:  Patient is a 68-year-old male who presented with acute appendicitis now status post laparoscopic appendectomy found to have perforated appendicitis IntraOp. Patient tolerating CLD at this time.    Plan:  -Regular diet  -Oral pain meds  -IV abx per ID  -OOB  -IS 6-10 times per hour    Patient will be discussed with Attending Physician Dr. Jazlyn Chan PGY4  General Surgery Service   ==============================================================================

## 2024-07-09 NOTE — PROGRESS NOTES
[facility] INFECTIOUS DISEASE PROGRESS NOTE    Patient Name: Jose Olivia  MRN: [id number]    INTERVAL HISTORY:   No fevers. WBC are better. On liquids. Complains of abx pain wendy w coughing    Patient Active Problem List   Diagnosis    Abnormal EKG    Abnormal stress test    Acute carpal tunnel syndrome, right    Acute sinusitis    Allergic rhinitis    Benign essential hypertension    Bilateral tinnitus    CAD (coronary artery disease)    Cervical vertigo    Chronic cough    Chronic glossitis    Claudication, intermittent (CMS-HCC)    Contact with and (suspected) exposure to covid-19    COPD, mild (Multi)    Mild chronic obstructive pulmonary disease (Multi)    Diabetes mellitus (Multi)    Diabetic peripheral neuropathy (Multi)    Disease suspected    Dizziness    Dry skin    Elevated serum creatinine    Fatigue    Hemiplegia (Multi)    History of elevated lipids    History of hypertension    HLD (hyperlipidemia)    Hyperkalemia    Hypogonadism in male    Imbalance    Insomnia    Left hemiparesis (Multi)    Low testosterone in male    Mild renal insufficiency    Nasal valve blockage    Numbness    Obstructive sleep apnea syndrome    Onychomycosis    Spondylosis of cervical spine    Osteoarthritis of spine with radiculopathy, cervical region    Left knee pain    Pain and swelling of lower leg, left    Pain of foot    Right hand paresthesia    Right hand weakness    Shortness of breath    Stroke (Multi)    Tinea corporis    Ulnar neuropathy at elbow, right    Acute appendicitis with localized peritonitis, without perforation, abscess, or gangrene        ASSESSMENT:   Perf Appendicitis  Localized peritonitis  HELLEN on CKD 3  DM2   CAD  COPD  HTN     Plan   Continue Ceftriaxone and Flagyl  Follow cultures  Check Bcx x 2 w fevers   Serial abd exams    MEDICATIONS: reviewed.    Current Facility-Administered Medications:     acetaminophen (Ofirmev) injection 1,000 mg, 1,000 mg, intravenous, q8h, Gilles Hobson MD, Stopped at  07/09/24 0639    [Held by provider] acetaminophen (Tylenol) tablet 650 mg, 650 mg, oral, q4h PRN, Jori Blanco MD    aspirin chewable tablet 81 mg, 81 mg, oral, Daily, Gilles Hobson MD, 81 mg at 07/08/24 0840    cefTRIAXone (Rocephin) 2 g in dextrose 5% in water (D5W) 50 mL, 2 g, intravenous, q24h, Hamlet Wolff MD, Stopped at 07/08/24 0910    clopidogrel (Plavix) tablet 75 mg, 75 mg, oral, Daily, Jori Blanco MD, 75 mg at 07/08/24 0840    dextrose 50 % injection 12.5 g, 12.5 g, intravenous, q15 min PRN, Gilles Hobson MD    dextrose 50 % injection 25 g, 25 g, intravenous, q15 min PRN, Gilles Hobson MD    ezetimibe (Zetia) tablet 10 mg, 10 mg, oral, Daily, Gilles Hobson MD, 10 mg at 07/08/24 0840    glucagon (Glucagen) injection 1 mg, 1 mg, intramuscular, q15 min PRN, Gilles Hobson MD    glucagon (Glucagen) injection 1 mg, 1 mg, intramuscular, q15 min PRN, Gilles Hobson MD    insulin lispro (HumaLOG) injection 0-10 Units, 0-10 Units, subcutaneous, TID, Gilles Hobson MD, 2 Units at 07/08/24 1646    isosorbide mononitrate ER (Imdur) 24 hr tablet 30 mg, 30 mg, oral, Daily, Gilles Hobson MD, 30 mg at 07/08/24 0840    lactated Ringer's infusion, 75 mL/hr, intravenous, Continuous, Gilles Hobson MD, Stopped at 07/08/24 1800    losartan (Cozaar) tablet 50 mg, 50 mg, oral, Daily, Gilles Hobson MD, 50 mg at 07/08/24 1956    metroNIDAZOLE (Flagyl) 500 mg in NaCl (iso-os) 100 mL, 500 mg, intravenous, q8h, Hamlet Wolff MD, Last Rate: 0 mL/hr at 07/09/24 0017, 500 mg at 07/09/24 0647    ondansetron (Zofran) injection 4 mg, 4 mg, intravenous, q6h PRN, Gilles Hobson MD    oxyCODONE (Roxicodone) immediate release tablet 10 mg, 10 mg, oral, q4h PRN, Woody Chan MD, 10 mg at 07/09/24 0624    oxyCODONE (Roxicodone) immediate release tablet 5 mg, 5 mg, oral, q4h PRN, Woody Chan MD     PHYSICAL EXAM:  Vital signs: /64 (BP Location: Right arm, Patient Position: Lying)   Pulse 61   Temp 36.6 °C (97.9 °F) (Temporal)   Resp 18    Ht 1.829 m (6')   Wt 94.9 kg (209 lb 3.5 oz)   SpO2 94%   BMI 28.37 kg/m²   Temp (24hrs), Av.7 °C (98.1 °F), Min:36.3 °C (97.4 °F), Max:37.3 °C (99.1 °F)    General: alert, oriented, NAD  Lungs: bilaterally clear to auscultation  Heart: regular rate and rhythm  Abdomen: soft, + tender, non distended, BS+  Extremities: no edema  No rashes  No joint inflammation  Neck supple  Lines ok  No CVAT              Labs:    Results for orders placed or performed during the hospital encounter of 24 (from the past 96 hour(s))   ECG 12 lead   Result Value Ref Range    Ventricular Rate 94 BPM    Atrial Rate 92 BPM    MA Interval 191 ms    QRS Duration 109 ms    QT Interval 397 ms    QTC Calculation(Bazett) 497 ms    P Axis 72 degrees    R Axis -47 degrees    T Axis -8 degrees    QRS Count 15 beats    Q Onset 252 ms    T Offset 451 ms    QTC Fredericia 461 ms   CBC and Auto Differential   Result Value Ref Range    WBC 21.3 (H) 4.4 - 11.3 x10*3/uL    nRBC 0.0 0.0 - 0.0 /100 WBCs    RBC 4.82 4.50 - 5.90 x10*6/uL    Hemoglobin 16.4 13.5 - 17.5 g/dL    Hematocrit 46.4 41.0 - 52.0 %    MCV 96 80 - 100 fL    MCH 34.0 26.0 - 34.0 pg    MCHC 35.3 32.0 - 36.0 g/dL    RDW 12.7 11.5 - 14.5 %    Platelets 219 150 - 450 x10*3/uL    Neutrophils % 95.1 40.0 - 80.0 %    Immature Granulocytes %, Automated 0.6 0.0 - 0.9 %    Lymphocytes % 1.3 13.0 - 44.0 %    Monocytes % 2.8 2.0 - 10.0 %    Eosinophils % 0.0 0.0 - 6.0 %    Basophils % 0.2 0.0 - 2.0 %    Neutrophils Absolute 20.28 (H) 1.20 - 7.70 x10*3/uL    Immature Granulocytes Absolute, Automated 0.13 0.00 - 0.70 x10*3/uL    Lymphocytes Absolute 0.28 (L) 1.20 - 4.80 x10*3/uL    Monocytes Absolute 0.59 0.10 - 1.00 x10*3/uL    Eosinophils Absolute 0.01 0.00 - 0.70 x10*3/uL    Basophils Absolute 0.04 0.00 - 0.10 x10*3/uL   Magnesium   Result Value Ref Range    Magnesium 1.50 (L) 1.60 - 2.40 mg/dL   Comprehensive metabolic panel   Result Value Ref Range    Glucose 166 (H) 74 - 99 mg/dL     Sodium 138 136 - 145 mmol/L    Potassium 4.1 3.5 - 5.3 mmol/L    Chloride 107 98 - 107 mmol/L    Bicarbonate 23 21 - 32 mmol/L    Anion Gap 12 10 - 20 mmol/L    Urea Nitrogen 26 (H) 6 - 23 mg/dL    Creatinine 1.67 (H) 0.50 - 1.30 mg/dL    eGFR 44 (L) >60 mL/min/1.73m*2    Calcium 9.4 8.6 - 10.3 mg/dL    Albumin 3.8 3.4 - 5.0 g/dL    Alkaline Phosphatase 68 33 - 136 U/L    Total Protein 6.4 6.4 - 8.2 g/dL    AST 14 9 - 39 U/L    Bilirubin, Total 1.3 (H) 0.0 - 1.2 mg/dL    ALT 19 10 - 52 U/L   Lipase   Result Value Ref Range    Lipase 13 9 - 82 U/L   Lactate   Result Value Ref Range    Lactate 1.6 0.4 - 2.0 mmol/L   Hemoglobin A1c   Result Value Ref Range    Hemoglobin A1C 6.9 (H) see below %    Estimated Average Glucose 151 Not Established mg/dL   Prepare Platelets: 2 Units   Result Value Ref Range    PRODUCT CODE KY909W15     Unit Number [id number]     Unit ABO AB     Unit RH NEG     Dispense Status RE     Blood Expiration Date July 09, 2024 23:59 EDT     PRODUCT BLOOD TYPE 2800     UNIT VOLUME 325     PRODUCT CODE JN615O65     Unit Number [id number]     Unit ABO AB     Unit RH NEG     Dispense Status TR     Blood Expiration Date July 09, 2024 23:59 EDT     PRODUCT BLOOD TYPE 2800     UNIT VOLUME 324    Type and screen   Result Value Ref Range    ABO TYPE AB     Rh TYPE POS     ANTIBODY SCREEN NEG    VERIFY ABO/Rh Group Test   Result Value Ref Range    ABO TYPE AB     Rh TYPE POS    POCT GLUCOSE   Result Value Ref Range    POCT Glucose 207 (H) 74 - 99 mg/dL   Tissue/Wound Culture/Smear    Specimen: Other (specify in comments); Tissue/Biopsy   Result Value Ref Range    Tissue/Wound Culture/Smear Culture in progress     Gram Stain (4+) Abundant Polymorphonuclear leukocytes (A)     Gram Stain (A)      (3+) Moderate Mixed Gram positive and Gram negative bacteria   POCT GLUCOSE   Result Value Ref Range    POCT Glucose 156 (H) 74 - 99 mg/dL   POCT GLUCOSE   Result Value Ref Range    POCT Glucose 177 (H) 74 -  99 mg/dL   POCT GLUCOSE   Result Value Ref Range    POCT Glucose 196 (H) 74 - 99 mg/dL   CBC and Auto Differential   Result Value Ref Range    WBC 16.6 (H) 4.4 - 11.3 x10*3/uL    nRBC 0.0 0.0 - 0.0 /100 WBCs    RBC 4.55 4.50 - 5.90 x10*6/uL    Hemoglobin 15.2 13.5 - 17.5 g/dL    Hematocrit 44.4 41.0 - 52.0 %    MCV 98 80 - 100 fL    MCH 33.4 26.0 - 34.0 pg    MCHC 34.2 32.0 - 36.0 g/dL    RDW 13.1 11.5 - 14.5 %    Platelets 217 150 - 450 x10*3/uL    Neutrophils % 91.0 40.0 - 80.0 %    Immature Granulocytes %, Automated 0.5 0.0 - 0.9 %    Lymphocytes % 3.8 13.0 - 44.0 %    Monocytes % 4.5 2.0 - 10.0 %    Eosinophils % 0.0 0.0 - 6.0 %    Basophils % 0.2 0.0 - 2.0 %    Neutrophils Absolute 15.06 (H) 1.20 - 7.70 x10*3/uL    Immature Granulocytes Absolute, Automated 0.09 0.00 - 0.70 x10*3/uL    Lymphocytes Absolute 0.63 (L) 1.20 - 4.80 x10*3/uL    Monocytes Absolute 0.75 0.10 - 1.00 x10*3/uL    Eosinophils Absolute 0.00 0.00 - 0.70 x10*3/uL    Basophils Absolute 0.03 0.00 - 0.10 x10*3/uL   Basic Metabolic Panel   Result Value Ref Range    Glucose 158 (H) 74 - 99 mg/dL    Sodium 136 136 - 145 mmol/L    Potassium 4.6 3.5 - 5.3 mmol/L    Chloride 105 98 - 107 mmol/L    Bicarbonate 22 21 - 32 mmol/L    Anion Gap 14 10 - 20 mmol/L    Urea Nitrogen 32 (H) 6 - 23 mg/dL    Creatinine 1.64 (H) 0.50 - 1.30 mg/dL    eGFR 45 (L) >60 mL/min/1.73m*2    Calcium 8.9 8.6 - 10.3 mg/dL   POCT GLUCOSE   Result Value Ref Range    POCT Glucose 132 (H) 74 - 99 mg/dL   POCT GLUCOSE   Result Value Ref Range    POCT Glucose 257 (H) 74 - 99 mg/dL   POCT GLUCOSE   Result Value Ref Range    POCT Glucose 166 (H) 74 - 99 mg/dL   POCT GLUCOSE   Result Value Ref Range    POCT Glucose 82 74 - 99 mg/dL   CBC   Result Value Ref Range    WBC 12.0 (H) 4.4 - 11.3 x10*3/uL    nRBC 0.0 0.0 - 0.0 /100 WBCs    RBC 4.16 (L) 4.50 - 5.90 x10*6/uL    Hemoglobin 13.6 13.5 - 17.5 g/dL    Hematocrit 40.9 (L) 41.0 - 52.0 %    MCV 98 80 - 100 fL    MCH 32.7 26.0 - 34.0 pg     MCHC 33.3 32.0 - 36.0 g/dL    RDW 13.0 11.5 - 14.5 %    Platelets 182 150 - 450 x10*3/uL   Basic Metabolic Panel   Result Value Ref Range    Glucose 105 (H) 74 - 99 mg/dL    Sodium 136 136 - 145 mmol/L    Potassium 4.1 3.5 - 5.3 mmol/L    Chloride 105 98 - 107 mmol/L    Bicarbonate 26 21 - 32 mmol/L    Anion Gap 9 (L) 10 - 20 mmol/L    Urea Nitrogen 29 (H) 6 - 23 mg/dL    Creatinine 1.53 (H) 0.50 - 1.30 mg/dL    eGFR 49 (L) >60 mL/min/1.73m*2    Calcium 8.2 (L) 8.6 - 10.3 mg/dL        Microbiology data: reviewed    Imaging data: reviewed      Hamlet Wolff  Pager:[phone]  Date of service: 7/9/2024  Time of service: 7:01 AM

## 2024-07-10 ENCOUNTER — APPOINTMENT (OUTPATIENT)
Dept: CARDIOLOGY | Facility: HOSPITAL | Age: 69
DRG: 397 | End: 2024-07-10
Payer: MEDICARE

## 2024-07-10 LAB
ANION GAP SERPL CALC-SCNC: 14 MMOL/L (ref 10–20)
BUN SERPL-MCNC: 25 MG/DL (ref 6–23)
CALCIUM SERPL-MCNC: 8.5 MG/DL (ref 8.6–10.3)
CHLORIDE SERPL-SCNC: 102 MMOL/L (ref 98–107)
CO2 SERPL-SCNC: 21 MMOL/L (ref 21–32)
CREAT SERPL-MCNC: 1.32 MG/DL (ref 0.5–1.3)
EGFRCR SERPLBLD CKD-EPI 2021: 58 ML/MIN/1.73M*2
ERYTHROCYTE [DISTWIDTH] IN BLOOD BY AUTOMATED COUNT: 12.6 % (ref 11.5–14.5)
GLUCOSE BLD MANUAL STRIP-MCNC: 142 MG/DL (ref 74–99)
GLUCOSE BLD MANUAL STRIP-MCNC: 181 MG/DL (ref 74–99)
GLUCOSE BLD MANUAL STRIP-MCNC: 233 MG/DL (ref 74–99)
GLUCOSE BLD MANUAL STRIP-MCNC: 242 MG/DL (ref 74–99)
GLUCOSE SERPL-MCNC: 156 MG/DL (ref 74–99)
HCT VFR BLD AUTO: 42.4 % (ref 41–52)
HGB BLD-MCNC: 14.6 G/DL (ref 13.5–17.5)
MAGNESIUM SERPL-MCNC: 1.72 MG/DL (ref 1.6–2.4)
MCH RBC QN AUTO: 33.3 PG (ref 26–34)
MCHC RBC AUTO-ENTMCNC: 34.4 G/DL (ref 32–36)
MCV RBC AUTO: 97 FL (ref 80–100)
NRBC BLD-RTO: 0 /100 WBCS (ref 0–0)
PLATELET # BLD AUTO: 186 X10*3/UL (ref 150–450)
POTASSIUM SERPL-SCNC: 3.9 MMOL/L (ref 3.5–5.3)
RBC # BLD AUTO: 4.38 X10*6/UL (ref 4.5–5.9)
SODIUM SERPL-SCNC: 133 MMOL/L (ref 136–145)
WBC # BLD AUTO: 12.3 X10*3/UL (ref 4.4–11.3)

## 2024-07-10 PROCEDURE — 2500000002 HC RX 250 W HCPCS SELF ADMINISTERED DRUGS (ALT 637 FOR MEDICARE OP, ALT 636 FOR OP/ED): Performed by: SURGERY

## 2024-07-10 PROCEDURE — 36415 COLL VENOUS BLD VENIPUNCTURE: CPT | Performed by: HOSPITALIST

## 2024-07-10 PROCEDURE — 99232 SBSQ HOSP IP/OBS MODERATE 35: CPT | Performed by: HOSPITALIST

## 2024-07-10 PROCEDURE — 82947 ASSAY GLUCOSE BLOOD QUANT: CPT

## 2024-07-10 PROCEDURE — 2500000004 HC RX 250 GENERAL PHARMACY W/ HCPCS (ALT 636 FOR OP/ED): Mod: JZ | Performed by: INTERNAL MEDICINE

## 2024-07-10 PROCEDURE — 93010 ELECTROCARDIOGRAM REPORT: CPT | Performed by: INTERNAL MEDICINE

## 2024-07-10 PROCEDURE — 2500000001 HC RX 250 WO HCPCS SELF ADMINISTERED DRUGS (ALT 637 FOR MEDICARE OP): Performed by: INTERNAL MEDICINE

## 2024-07-10 PROCEDURE — 85027 COMPLETE CBC AUTOMATED: CPT | Performed by: HOSPITALIST

## 2024-07-10 PROCEDURE — 2500000001 HC RX 250 WO HCPCS SELF ADMINISTERED DRUGS (ALT 637 FOR MEDICARE OP): Performed by: SURGERY

## 2024-07-10 PROCEDURE — 80048 BASIC METABOLIC PNL TOTAL CA: CPT | Performed by: HOSPITALIST

## 2024-07-10 PROCEDURE — 93005 ELECTROCARDIOGRAM TRACING: CPT

## 2024-07-10 PROCEDURE — 2060000001 HC INTERMEDIATE ICU ROOM DAILY

## 2024-07-10 PROCEDURE — 2500000001 HC RX 250 WO HCPCS SELF ADMINISTERED DRUGS (ALT 637 FOR MEDICARE OP): Performed by: STUDENT IN AN ORGANIZED HEALTH CARE EDUCATION/TRAINING PROGRAM

## 2024-07-10 PROCEDURE — 2500000004 HC RX 250 GENERAL PHARMACY W/ HCPCS (ALT 636 FOR OP/ED): Performed by: STUDENT IN AN ORGANIZED HEALTH CARE EDUCATION/TRAINING PROGRAM

## 2024-07-10 PROCEDURE — 2500000004 HC RX 250 GENERAL PHARMACY W/ HCPCS (ALT 636 FOR OP/ED): Performed by: HOSPITALIST

## 2024-07-10 PROCEDURE — 83735 ASSAY OF MAGNESIUM: CPT | Performed by: HOSPITALIST

## 2024-07-10 RX ORDER — POLYETHYLENE GLYCOL 3350 17 G/17G
17 POWDER, FOR SOLUTION ORAL DAILY PRN
Status: DISCONTINUED | OUTPATIENT
Start: 2024-07-10 | End: 2024-07-11

## 2024-07-10 RX ORDER — SODIUM CHLORIDE 9 MG/ML
75 INJECTION, SOLUTION INTRAVENOUS CONTINUOUS
Status: ACTIVE | OUTPATIENT
Start: 2024-07-10 | End: 2024-07-11

## 2024-07-10 RX ORDER — HYDROMORPHONE HYDROCHLORIDE 0.2 MG/ML
0.2 INJECTION INTRAMUSCULAR; INTRAVENOUS; SUBCUTANEOUS
Status: DISCONTINUED | OUTPATIENT
Start: 2024-07-10 | End: 2024-07-15 | Stop reason: HOSPADM

## 2024-07-10 RX ADMIN — METRONIDAZOLE 500 MG: 500 INJECTION, SOLUTION INTRAVENOUS at 06:31

## 2024-07-10 RX ADMIN — EZETIMIBE 10 MG: 10 TABLET ORAL at 09:49

## 2024-07-10 RX ADMIN — POLYETHYLENE GLYCOL 3350 17 G: 17 POWDER, FOR SOLUTION ORAL at 09:49

## 2024-07-10 RX ADMIN — HYDROMORPHONE HYDROCHLORIDE 0.2 MG: 0.2 INJECTION, SOLUTION INTRAMUSCULAR; INTRAVENOUS; SUBCUTANEOUS at 23:06

## 2024-07-10 RX ADMIN — METRONIDAZOLE 500 MG: 500 INJECTION, SOLUTION INTRAVENOUS at 14:16

## 2024-07-10 RX ADMIN — OXYCODONE HYDROCHLORIDE 10 MG: 10 TABLET ORAL at 21:43

## 2024-07-10 RX ADMIN — METRONIDAZOLE 500 MG: 500 INJECTION, SOLUTION INTRAVENOUS at 23:06

## 2024-07-10 RX ADMIN — ISOSORBIDE MONONITRATE 30 MG: 30 TABLET, EXTENDED RELEASE ORAL at 09:49

## 2024-07-10 RX ADMIN — INSULIN LISPRO 4 UNITS: 100 INJECTION, SOLUTION INTRAVENOUS; SUBCUTANEOUS at 12:07

## 2024-07-10 RX ADMIN — ACETAMINOPHEN 650 MG: 325 TABLET ORAL at 12:09

## 2024-07-10 RX ADMIN — SODIUM CHLORIDE 75 ML/HR: 9 INJECTION, SOLUTION INTRAVENOUS at 11:44

## 2024-07-10 RX ADMIN — CLOPIDOGREL 75 MG: 75 TABLET ORAL at 09:49

## 2024-07-10 RX ADMIN — ASPIRIN 81 MG CHEWABLE TABLET 81 MG: 81 TABLET CHEWABLE at 09:49

## 2024-07-10 RX ADMIN — CEFTRIAXONE SODIUM 2 G: 2 INJECTION, SOLUTION INTRAVENOUS at 06:31

## 2024-07-10 RX ADMIN — INSULIN LISPRO 2 UNITS: 100 INJECTION, SOLUTION INTRAVENOUS; SUBCUTANEOUS at 09:51

## 2024-07-10 RX ADMIN — LOSARTAN POTASSIUM 50 MG: 50 TABLET, FILM COATED ORAL at 21:42

## 2024-07-10 SDOH — ECONOMIC STABILITY: INCOME INSECURITY: HOW HARD IS IT FOR YOU TO PAY FOR THE VERY BASICS LIKE FOOD, HOUSING, MEDICAL CARE, AND HEATING?: NOT VERY HARD

## 2024-07-10 SDOH — ECONOMIC STABILITY: TRANSPORTATION INSECURITY
IN THE PAST 12 MONTHS, HAS LACK OF TRANSPORTATION KEPT YOU FROM MEETINGS, WORK, OR FROM GETTING THINGS NEEDED FOR DAILY LIVING?: NO

## 2024-07-10 SDOH — ECONOMIC STABILITY: TRANSPORTATION INSECURITY
IN THE PAST 12 MONTHS, HAS THE LACK OF TRANSPORTATION KEPT YOU FROM MEDICAL APPOINTMENTS OR FROM GETTING MEDICATIONS?: NO

## 2024-07-10 SDOH — ECONOMIC STABILITY: HOUSING INSECURITY: AT ANY TIME IN THE PAST 12 MONTHS, WERE YOU HOMELESS OR LIVING IN A SHELTER (INCLUDING NOW)?: NO

## 2024-07-10 SDOH — ECONOMIC STABILITY: INCOME INSECURITY: IN THE LAST 12 MONTHS, WAS THERE A TIME WHEN YOU WERE NOT ABLE TO PAY THE MORTGAGE OR RENT ON TIME?: NO

## 2024-07-10 SDOH — ECONOMIC STABILITY: HOUSING INSECURITY: IN THE PAST 12 MONTHS, HOW MANY TIMES HAVE YOU MOVED WHERE YOU WERE LIVING?: 1

## 2024-07-10 ASSESSMENT — COGNITIVE AND FUNCTIONAL STATUS - GENERAL: DAILY ACTIVITIY SCORE: 24

## 2024-07-10 ASSESSMENT — PAIN SCALES - GENERAL
PAINLEVEL_OUTOF10: 3
PAINLEVEL_OUTOF10: 0 - NO PAIN
PAINLEVEL_OUTOF10: 5 - MODERATE PAIN
PAINLEVEL_OUTOF10: 8
PAINLEVEL_OUTOF10: 8
PAINLEVEL_OUTOF10: 0 - NO PAIN

## 2024-07-10 ASSESSMENT — PAIN - FUNCTIONAL ASSESSMENT
PAIN_FUNCTIONAL_ASSESSMENT: 0-10
PAIN_FUNCTIONAL_ASSESSMENT: 0-10

## 2024-07-10 ASSESSMENT — PAIN DESCRIPTION - LOCATION: LOCATION: ABDOMEN

## 2024-07-10 ASSESSMENT — PAIN SCALES - WONG BAKER: WONGBAKER_NUMERICALRESPONSE: NO HURT

## 2024-07-10 ASSESSMENT — PAIN DESCRIPTION - ORIENTATION: ORIENTATION: RIGHT;LEFT;LOWER

## 2024-07-10 NOTE — PROGRESS NOTES
Patient is being monitored by surgery. No changes in discharge needs at this time. Patient is aware of Care Transitions if any needs should arise.

## 2024-07-10 NOTE — CARE PLAN
The patient's goals for the shift include stay informed     The clinical goals for the shift include patient will be free of falls throughout the shift    Over the shift, the patient did make progress toward the following goals. Barriers to progression include understanding. Recommendations to address these barriers include education.

## 2024-07-10 NOTE — PROGRESS NOTES
Jose Olivia [id number]   Service: Internal Medicine / Hospitalist Date of service: 7/10/2024                                   Full Code         Overnight Events: No new overnight events reported by patient or nursing.      Subjective        History Of Present Illness  Jose Olivia is a 68 y.o. male with past medical history of HTN, CAD, mild COPD, DM with diabetic peripheral neuropathy, hyperlipidemia, mild renal insufficiency and CVA 2018 (on aspirin and Plavix since that time) comes to the hospital with abdominal pain.  The patient states that his pain began yesterday morning while he was working on his water heater.  According the patient the pain moved from the middle of the abdomen more to the right.  He reported some nausea and vomiting.  He also reported some diarrhea.  He reported some chills and sweats but denies any chest pain or shortness of breath.  Initially the patient wanted to leave AMA to finish his water heater work but then later decided to stay for surgical intervention.  At this time the patient states his abdominal pain is improved and he is awaiting to go to the OR for surgery later today.         7/8.................No reported: Chest pains, dyspnea, orthopnea, palpitations, fevers or chills.Patient reported that overall he was doing well given recent surgery.  He endorsed some abdominal pain related to recent surgery.     7/9..............The patient reported feeling better today no reported : nausea, vomiting, chest pains or palpitations.The patient felt that his postop related abdominal pain was tolerable.       7/10................No reported: Palpitations, headaches, chest pains, fevers, chills, nausea or vomiting.  Patient endorsed constipation.     Review of Systems:   Review of system otherwise negative if not aforementioned above in subjective.     Objective           Physical Exam      Constitutional:       Appearance: Patient appeared in no acute cardiopulmonary distress.      Comments: Patient alert and oriented to person place time and situation.  HEENT:      Head: Normocephalic and atraumatic.Trachea midline      Nose:No observed congestion or rhinorrhea.     Mouth/Throat: Mucous membranes Moist, Trachea appeared  midline.  Eyes:      Extraocular Movements: Extraocular movements intact.      Pupils: Pupils are equal, round, and reactive to light.      Comments: No scleral icterus or conjunctival injection appreciated.   Cardiovascular:      Rate and Rhythm: Normal rate and regular rhythm. No clicks rubs or gallops, normal S1 and S2.No peripheral stigmata of endocarditis appreciated.     Pulmonary:      Lungs appeared clear to auscultation, no adventitious sound appreciated.  Abdominal:      General: Status post surgical intervention, abdomen nondistended, hypoactive bowel sounds, no involuntary guarding or rebound tenderness appreciated.     Comments: None   Musculoskeletal:       Patient appeared to have full active range of motion for upper and lower extremities, no acute apparent joint deformity appreciated on examination.   No pitting edema or cyanosis appreciated.       Lymphadenopathy:      No appreciable palpable lymphadenopathy  Skin:     General: Skin is warm.      Coloration:  No jaundice     Findings: No Pandey Banks's or Hunter signs appreciated.  No appreciation of erythema or signs of infectious process related to surgical site/Laparoscopic incisions,dressing removed today..  Neurological:      General: No focal sensory or motor deficits appreciated, no meningeal signs or dysmetria noted.      Cranial Nerves: Cranial nerves II to XII appearing grossly intact.     Genitals:  Deferred  Psychiatric:         The patient appears to be displaying normal mood and affect at the time of evaluation.     Labs:        Lab Results   Component Value Date    GLUCOSE 156 (H) 07/10/2024    CALCIUM 8.5 (L) 07/10/2024     (L) 07/10/2024    K 3.9 07/10/2024    CO2 21 07/10/2024    CL  102 07/10/2024    BUN 25 (H) 07/10/2024    CREATININE 1.32 (H) 07/10/2024      Lab Results   Component Value Date    WBC 12.3 (H) 07/10/2024    HGB 14.6 07/10/2024    HCT 42.4 07/10/2024    MCV 97 07/10/2024     07/10/2024      [unfilled]   [unfilled]   No results found for the last 90 days.                  X-rays/ Images     [unfilled]   Radiology Results (last 21 days)     Procedure Component Value Units Date/Time   CT abdomen pelvis w IV contrast [[id number]] Collected: 07/07/24 0400   Order Status: Completed Updated: 07/07/24 0400   Narrative:     Interpreted By:  Corina Farmer,  STUDY:  CT ABDOMEN PELVIS W IV CONTRAST;  7/7/2024 3:45 am      INDICATION:  Signs/Symptoms:RLQ tenderness guarding.      COMPARISON:  CT scan of the abdomen pelvis 09/19/2011      ACCESSION NUMBER(S):  [id number]      ORDERING CLINICIAN:  LIVAN FLORES      TECHNIQUE:  Axial CT images of the abdomen and pelvis with coronal and sagittal  reconstructed images obtained after intravenous administration of 75  mL of Omnipaque 350      FINDINGS:  LOWER CHEST: Peripheral reticular opacities in the lung bases likely  relate to chronic fibrotic changes. Calcified granulomas noted right  lung base. Aortic root, mitral annular and coronary artery  calcifications noted.      ABDOMEN:      LIVER: Morphology. Hepatic steatosis.  BILE DUCTS: Normal caliber.  GALLBLADDER: No calcified gallstones. No wall thickening.  PANCREAS: Mild fatty atrophy of the pancreas.  SPLEEN: Within normal limits.  Accessory splenule noted.  ADRENALS: Within normal limits.  KIDNEYS and URETERS: Symmetric renal enhancement. No hydronephrosis  or hydroureter. There is a 3 mm calculus in the lower pole of the  right kidney. Subcentimeter hypodense foci bilaterally are too small  to characterize. Bilateral perinephric stranding is nonspecific and  could be age related.      VESSELS:  Calcific atherosclerosis of the aortoiliac and mesenteric  vessels. No aortic  aneurysm. RETROPERITONEUM: No pathologically  enlarged retroperitoneal lymph nodes.      PELVIS:      REPRODUCTIVE ORGANS: Prostate gland is enlarged protruding into the  base of the bladder. BLADDER: Bladder is moderately distended with  mild wall thickening.      BOWEL: No dilated bowel. Appendix is dilated measuring up to 12 mm.  There is an appendicolith of the base of the appendix. There is wall  thickening and surrounding inflammatory changes consistent with acute  appendicitis. No evidence for microperforation or abscess formation  at this time. Mild bowel wall thickening involving several loops of  small bowel in the right lower quadrant, likely reactive no  pneumatosis or portal venous gas. A few scattered colonic diverticula  without evidence for acute diverticulitis. PERITONEUM: No ascites or  free air, no fluid collection.      ABDOMINAL WALL: Bilateral inguinal hernias containing fat. Small  umbilical hernia contains fat. BONES: Multilevel degenerative changes  of the spine.       Impression:     Dilated appendix measuring 12 mm with periappendiceal inflammatory  changes and wall thickening consistent with acute appendicitis. No  evidence for microperforation or abscess formation. Surgical  consultation is advised.      Mild bowel wall thickening involving several loops of bowel in the  right lower quadrant, likely reactive. Diverticulosis without  evidence for acute diverticulitis.      Prostatomegaly with mild bladder wall thickening which may relate to  under distention or sequela of chronic outlet obstruction. Correlate  with urinalysis to exclude infectious cystitis.      Nonobstructing right renal calculus.      Additional findings as described above.      MACRO:  None      Signed by: Corina Farmer 7/7/2024 3:59 AM  Dictation workstation:   [id number]                  Medical Problems         Problem List         * (Principal) Acute appendicitis with localized peritonitis, without perforation,  abscess, or gangrene     Abnormal EKG     Abnormal stress test     Acute carpal tunnel syndrome, right     Acute sinusitis     Allergic rhinitis     Benign essential hypertension     Bilateral tinnitus     CAD (coronary artery disease)     Cervical vertigo     Chronic cough     Chronic glossitis     Claudication, intermittent (CMS-HCC)     Contact with and (suspected) exposure to covid-19     COPD, mild (Multi)     Mild chronic obstructive pulmonary disease (Multi)     Diabetes mellitus (Multi)     Diabetic peripheral neuropathy (Multi)     Disease suspected     Dizziness     Dry skin     Elevated serum creatinine     Fatigue     Hemiplegia (Multi)     History of elevated lipids     History of hypertension     HLD (hyperlipidemia)     Hyperkalemia     Hypogonadism in male     Imbalance     Insomnia     Left hemiparesis (Multi)     Low testosterone in male     Mild renal insufficiency     Nasal valve blockage     Numbness     Obstructive sleep apnea syndrome     Onychomycosis     Spondylosis of cervical spine     Osteoarthritis of spine with radiculopathy, cervical region     Left knee pain     Pain and swelling of lower leg, left     Pain of foot     Right hand paresthesia     Right hand weakness     Shortness of breath     Stroke (Multi)     Tinea corporis     Ulnar neuropathy at elbow, right                  Above medical problems may be reflective of historical medical problems that may have resolved and may not related to acute clinical condition/medical problems.     Clinical impression/plan:     Principal Problem:    Acute appendicitis with localized peritonitis, without perforation, abscess, or gangrene        Acute appendicitis, complicated by localized peritonitis status post appendectomy   -Finding on CAT scan concerning for acute appendicitis.  Surgery has been consulted plan 4 OR today.  -Leukocytosis secondary appendicitis.  Continue Zosyn.     HELLEN with underlying CKD  -Creatinine 1.67 on admission.   Continue IV fluid hydration  -Avoid nephrotoxins check renal functions in AM.     Diabetes mellitus  -Continue sliding scale insulin last A1c is from 2022 shows A1c of 6.5  -Recheck A1c     Coronary artery disease  -Continue with aspirin and Plavix postoperatively once okay with surgery.     Hypertension  -Holding losartan in setting of HELLEN resume once renal functions improved.     COPD  -Currently stable     Disposition/additional care plan/interventions: 7/10/2024     Transitioned to gram-negative coverage with Rocephin and anaerobic coverage and Flagyl as per infectious disease, continue stewardship as per ID....................Duration of antibiotics as per ID.       Nonacute appearing abdomen, hypoactive, constipation persists, MiraLAX added it appeared by the surgical service .    Will add IV fluids at this time.    Avoid opioids when possible    Nonacute appearing abdomen while constipation is suspected, may benefit from a CT scan of the abdomen and pelvis to rule out other concomitant pathology including but limited to manifestation of peritonitis/abscess.....................will defer to surgical service at this time.    Continue supportive care  Possible transition to oral therapy to cover anaerobic and gram-negative pathogen at the time of discharge, will defer to ID.     Monitor fever curve     Monitor leukocytosis.        The patient was informed of differential diagnosis , work up , plan of care and possible sequelae of clinical disposition.Patient in agreement with plan of care. Further recommendations forthcoming in accordance with patient's clinical disposition and response to care.     Discharge planning:Discharge timing to be determined, in accordance recommendation as well.  From her surgical service.     Care time: < 55  mins              Dictation performed with assistance of voice recognition device therefore transcription errors are possible.

## 2024-07-10 NOTE — PROGRESS NOTES
[facility]  GENERAL SURGERY - PROGRESS NOTE    Patient Name: Jose Olivia  MRN: [id number]  Admit Date: 707  : 1955  AGE: 68 y.o.   GENDER: male    CHIEF COMPLAINT / EVENTS LAST 24HRS / HPI:  NAEO, tolerating CLD, no BMs    MEDICAL HISTORY / ROS:   Admission history and ROS reviewed. Pertinent changes as follows:      Review of Systems   Constitutional:   Negative for fever, chills, weight loss.   HENT:  Negative for congestion and dental problem.    Eyes:  Negative for discharge and itching.   Respiratory: . Negative for apnea, choking and wheezing.    Cardiovascular:  Negative for palpitations and leg swelling.   Gastrointestinal:  negative for n/v  Endocrine: Negative for cold intolerance and heat intolerance.   Musculoskeletal:  Negative for neck pain.   Skin:  Negative for color change.   Neurological:  Negative for tingling, loss of consciousness and numbness.   Psychiatric/Behavioral:  Negative for agitation and behavioral problems.      PHYSICAL EXAM:  Heart Rate:  []   Temp:  [36.3 °C (97.3 °F)-37.7 °C (99.9 °F)]   Resp:  [17-18]   BP: (115-177)/(63-83)   Weight:  [94.3 kg (207 lb 14.3 oz)]   SpO2:  [95 %-96 %]   Physical Exam  Constitutional:       Appearance: Normal appearance.   HENT:      Head: Normocephalic.      Mouth/Throat:      Mouth: Mucous membranes are moist.   Eyes:      Pupils: Pupils are equal, round, and reactive to light.   Cardiovascular:      Rate and Rhythm: Normal rate.   Abdominal:      General: Abdomen is flat.      Comments: Laparoscopic incisions with dressings in place, appropriately tender to palpation, soft no rebound or guarding.   Musculoskeletal:         General: Normal range of motion.   Skin:     General: Skin is warm.      Capillary Refill: Capillary refill takes less than 2 seconds.   Neurological:      Mental Status: He is alert.   Psychiatric:         Mood and Affect: Mood normal.             I have reviewed all  medications, laboratory results, and imaging pertinent for today's encounter.    ==============================================================================  TODAY'S ASSESSMENT AND PLAN OF CARE:  Patient is a 68-year-old male who presented with acute appendicitis now status post laparoscopic appendectomy found to have perforated appendicitis IntraOp. Patient tolerating CLD at this time.  Patient has persistent leukocytosis and  in right lower quadrant if patient's labs worsening or exam does not improve within the next 24 hours will plan to CT scan for possible abscess    Plan:  -Regular diet  -Oral pain meds  -IV abx per ID  -OOB  -IS 6-10 times per hour  -AM labs if worsen will likely obtain CT abdomen    Patient will be discussed with Attending Physician Dr. Jazlyn Chan PGY4  General Surgery Service   ==============================================================================

## 2024-07-10 NOTE — CARE PLAN
The patient's goals for the shift include      The clinical goals for the shift include Pt will be free of complaints of acute pain throughout shift.    Over the shift, the patient did not make progress toward the following goals. Barriers to progression include history of acute pain from appendicitis. Recommendations to address these barriers include us of frequent rounding.

## 2024-07-11 ENCOUNTER — APPOINTMENT (OUTPATIENT)
Dept: CARDIOLOGY | Facility: HOSPITAL | Age: 69
DRG: 397 | End: 2024-07-11
Payer: MEDICARE

## 2024-07-11 ENCOUNTER — APPOINTMENT (OUTPATIENT)
Dept: RADIOLOGY | Facility: HOSPITAL | Age: 69
DRG: 397 | End: 2024-07-11
Payer: MEDICARE

## 2024-07-11 LAB
ANION GAP SERPL CALC-SCNC: 15 MMOL/L (ref 10–20)
AORTIC VALVE MEAN GRADIENT: 5 MMHG
AORTIC VALVE PEAK VELOCITY: 1.63 M/S
APPEARANCE UR: CLEAR
APTT PPP: 28 SECONDS (ref 27–38)
ATRIAL RATE: 70 BPM
AV PEAK GRADIENT: 10.6 MMHG
AVA (PEAK VEL): 2.43 CM2
AVA (VTI): 2.47 CM2
BILIRUB UR STRIP.AUTO-MCNC: NEGATIVE MG/DL
BODY SURFACE AREA: 2.19 M2
BUN SERPL-MCNC: 23 MG/DL (ref 6–23)
CALCIUM SERPL-MCNC: 7.2 MG/DL (ref 8.6–10.3)
CHLORIDE SERPL-SCNC: 110 MMOL/L (ref 98–107)
CO2 SERPL-SCNC: 14 MMOL/L (ref 21–32)
COLOR UR: YELLOW
CREAT SERPL-MCNC: 0.96 MG/DL (ref 0.5–1.3)
EGFRCR SERPLBLD CKD-EPI 2021: 86 ML/MIN/1.73M*2
EJECTION FRACTION APICAL 4 CHAMBER: 64.3
EJECTION FRACTION: 63 %
ERYTHROCYTE [DISTWIDTH] IN BLOOD BY AUTOMATED COUNT: 12.5 % (ref 11.5–14.5)
GLUCOSE BLD MANUAL STRIP-MCNC: 159 MG/DL (ref 74–99)
GLUCOSE BLD MANUAL STRIP-MCNC: 190 MG/DL (ref 74–99)
GLUCOSE BLD MANUAL STRIP-MCNC: 208 MG/DL (ref 74–99)
GLUCOSE BLD MANUAL STRIP-MCNC: 262 MG/DL (ref 74–99)
GLUCOSE SERPL-MCNC: 179 MG/DL (ref 74–99)
GLUCOSE UR STRIP.AUTO-MCNC: ABNORMAL MG/DL
HCT VFR BLD AUTO: 43.7 % (ref 41–52)
HGB BLD-MCNC: 15.2 G/DL (ref 13.5–17.5)
HOLD SPECIMEN: NORMAL
HYALINE CASTS #/AREA URNS AUTO: ABNORMAL /LPF
INR PPP: 1.2 (ref 0.9–1.1)
KETONES UR STRIP.AUTO-MCNC: ABNORMAL MG/DL
LACTATE SERPL-SCNC: 0.8 MMOL/L (ref 0.4–2)
LEFT ATRIUM VOLUME AREA LENGTH INDEX BSA: 20.7 ML/M2
LEFT VENTRICLE INTERNAL DIMENSION DIASTOLE: 4.5 CM (ref 3.5–6)
LEFT VENTRICULAR OUTFLOW TRACT DIAMETER: 2.3 CM
LEUKOCYTE ESTERASE UR QL STRIP.AUTO: NEGATIVE
LV EJECTION FRACTION BIPLANE: 67 %
MCH RBC QN AUTO: 33.3 PG (ref 26–34)
MCHC RBC AUTO-ENTMCNC: 34.8 G/DL (ref 32–36)
MCV RBC AUTO: 96 FL (ref 80–100)
MUCOUS THREADS #/AREA URNS AUTO: ABNORMAL /LPF
NITRITE UR QL STRIP.AUTO: NEGATIVE
NRBC BLD-RTO: 0 /100 WBCS (ref 0–0)
PH UR STRIP.AUTO: 6 [PH]
PLATELET # BLD AUTO: 206 X10*3/UL (ref 150–450)
POTASSIUM SERPL-SCNC: 4.4 MMOL/L (ref 3.5–5.3)
PROT UR STRIP.AUTO-MCNC: ABNORMAL MG/DL
PROTHROMBIN TIME: 13.2 SECONDS (ref 9.8–12.8)
Q ONSET: 211 MS
QRS COUNT: 11 BEATS
QRS DURATION: 90 MS
QT INTERVAL: 410 MS
QTC CALCULATION(BAZETT): 442 MS
QTC FREDERICIA: 431 MS
R AXIS: -48 DEGREES
RBC # BLD AUTO: 4.57 X10*6/UL (ref 4.5–5.9)
RBC # UR STRIP.AUTO: NEGATIVE /UL
RBC #/AREA URNS AUTO: ABNORMAL /HPF
RIGHT VENTRICLE FREE WALL PEAK S': 18.6 CM/S
RIGHT VENTRICLE PEAK SYSTOLIC PRESSURE: 26.8 MMHG
SODIUM SERPL-SCNC: 135 MMOL/L (ref 136–145)
SP GR UR STRIP.AUTO: 1.02
T AXIS: 15 DEGREES
T OFFSET: 416 MS
TRICUSPID ANNULAR PLANE SYSTOLIC EXCURSION: 2.7 CM
TSH SERPL-ACNC: 4.04 MIU/L (ref 0.44–3.98)
UFH PPP CHRO-ACNC: 0.5 IU/ML
UROBILINOGEN UR STRIP.AUTO-MCNC: NORMAL MG/DL
VENTRICULAR RATE: 70 BPM
WBC # BLD AUTO: 12.5 X10*3/UL (ref 4.4–11.3)
WBC #/AREA URNS AUTO: ABNORMAL /HPF
YEAST BUDDING #/AREA UR COMP ASSIST: PRESENT /HPF

## 2024-07-11 PROCEDURE — A9698 NON-RAD CONTRAST MATERIALNOC: HCPCS | Performed by: STUDENT IN AN ORGANIZED HEALTH CARE EDUCATION/TRAINING PROGRAM

## 2024-07-11 PROCEDURE — 0W9G3ZZ DRAINAGE OF PERITONEAL CAVITY, PERCUTANEOUS APPROACH: ICD-10-PCS | Performed by: RADIOLOGY

## 2024-07-11 PROCEDURE — 82947 ASSAY GLUCOSE BLOOD QUANT: CPT

## 2024-07-11 PROCEDURE — 71045 X-RAY EXAM CHEST 1 VIEW: CPT

## 2024-07-11 PROCEDURE — 2720000007 HC OR 272 NO HCPCS

## 2024-07-11 PROCEDURE — 80048 BASIC METABOLIC PNL TOTAL CA: CPT | Performed by: HOSPITALIST

## 2024-07-11 PROCEDURE — 99222 1ST HOSP IP/OBS MODERATE 55: CPT | Performed by: INTERNAL MEDICINE

## 2024-07-11 PROCEDURE — 36415 COLL VENOUS BLD VENIPUNCTURE: CPT | Performed by: HOSPITALIST

## 2024-07-11 PROCEDURE — 83605 ASSAY OF LACTIC ACID: CPT | Performed by: HOSPITALIST

## 2024-07-11 PROCEDURE — 87086 URINE CULTURE/COLONY COUNT: CPT | Mod: PORLAB | Performed by: HOSPITALIST

## 2024-07-11 PROCEDURE — 2500000004 HC RX 250 GENERAL PHARMACY W/ HCPCS (ALT 636 FOR OP/ED): Performed by: INTERNAL MEDICINE

## 2024-07-11 PROCEDURE — 2500000001 HC RX 250 WO HCPCS SELF ADMINISTERED DRUGS (ALT 637 FOR MEDICARE OP): Performed by: STUDENT IN AN ORGANIZED HEALTH CARE EDUCATION/TRAINING PROGRAM

## 2024-07-11 PROCEDURE — 10160 PNXR ASPIR ABSC HMTMA BULLA: CPT | Performed by: RADIOLOGY

## 2024-07-11 PROCEDURE — 81001 URINALYSIS AUTO W/SCOPE: CPT | Performed by: EMERGENCY MEDICINE

## 2024-07-11 PROCEDURE — 2500000001 HC RX 250 WO HCPCS SELF ADMINISTERED DRUGS (ALT 637 FOR MEDICARE OP): Performed by: INTERNAL MEDICINE

## 2024-07-11 PROCEDURE — 87070 CULTURE OTHR SPECIMN AEROBIC: CPT | Mod: PORLAB | Performed by: SURGERY

## 2024-07-11 PROCEDURE — 99233 SBSQ HOSP IP/OBS HIGH 50: CPT | Performed by: HOSPITALIST

## 2024-07-11 PROCEDURE — 84443 ASSAY THYROID STIM HORMONE: CPT | Performed by: HOSPITALIST

## 2024-07-11 PROCEDURE — C1729 CATH, DRAINAGE: HCPCS

## 2024-07-11 PROCEDURE — 2550000001 HC RX 255 CONTRASTS: Performed by: STUDENT IN AN ORGANIZED HEALTH CARE EDUCATION/TRAINING PROGRAM

## 2024-07-11 PROCEDURE — 2500000004 HC RX 250 GENERAL PHARMACY W/ HCPCS (ALT 636 FOR OP/ED): Performed by: HOSPITALIST

## 2024-07-11 PROCEDURE — 2500000002 HC RX 250 W HCPCS SELF ADMINISTERED DRUGS (ALT 637 FOR MEDICARE OP, ALT 636 FOR OP/ED): Performed by: SURGERY

## 2024-07-11 PROCEDURE — 93306 TTE W/DOPPLER COMPLETE: CPT

## 2024-07-11 PROCEDURE — 93306 TTE W/DOPPLER COMPLETE: CPT | Performed by: INTERNAL MEDICINE

## 2024-07-11 PROCEDURE — 85610 PROTHROMBIN TIME: CPT | Performed by: HOSPITALIST

## 2024-07-11 PROCEDURE — 85520 HEPARIN ASSAY: CPT | Performed by: HOSPITALIST

## 2024-07-11 PROCEDURE — 85730 THROMBOPLASTIN TIME PARTIAL: CPT | Performed by: HOSPITALIST

## 2024-07-11 PROCEDURE — 2060000001 HC INTERMEDIATE ICU ROOM DAILY

## 2024-07-11 PROCEDURE — 2500000004 HC RX 250 GENERAL PHARMACY W/ HCPCS (ALT 636 FOR OP/ED): Performed by: SURGERY

## 2024-07-11 PROCEDURE — 85027 COMPLETE CBC AUTOMATED: CPT | Performed by: HOSPITALIST

## 2024-07-11 PROCEDURE — 74177 CT ABD & PELVIS W/CONTRAST: CPT | Performed by: RADIOLOGY

## 2024-07-11 PROCEDURE — 2550000001 HC RX 255 CONTRASTS: Performed by: HOSPITALIST

## 2024-07-11 PROCEDURE — 2500000001 HC RX 250 WO HCPCS SELF ADMINISTERED DRUGS (ALT 637 FOR MEDICARE OP): Performed by: SURGERY

## 2024-07-11 PROCEDURE — 2500000004 HC RX 250 GENERAL PHARMACY W/ HCPCS (ALT 636 FOR OP/ED): Performed by: STUDENT IN AN ORGANIZED HEALTH CARE EDUCATION/TRAINING PROGRAM

## 2024-07-11 PROCEDURE — 74177 CT ABD & PELVIS W/CONTRAST: CPT

## 2024-07-11 PROCEDURE — 2500000001 HC RX 250 WO HCPCS SELF ADMINISTERED DRUGS (ALT 637 FOR MEDICARE OP): Performed by: HOSPITALIST

## 2024-07-11 PROCEDURE — 2500000005 HC RX 250 GENERAL PHARMACY W/O HCPCS: Performed by: RADIOLOGY

## 2024-07-11 PROCEDURE — 76942 ECHO GUIDE FOR BIOPSY: CPT | Performed by: RADIOLOGY

## 2024-07-11 RX ORDER — METOPROLOL TARTRATE 25 MG/1
25 TABLET, FILM COATED ORAL 2 TIMES DAILY
Status: DISCONTINUED | OUTPATIENT
Start: 2024-07-11 | End: 2024-07-11

## 2024-07-11 RX ORDER — POLYETHYLENE GLYCOL 3350 17 G/17G
17 POWDER, FOR SOLUTION ORAL DAILY
Status: DISCONTINUED | OUTPATIENT
Start: 2024-07-11 | End: 2024-07-13

## 2024-07-11 RX ORDER — DOCUSATE SODIUM 100 MG/1
100 CAPSULE, LIQUID FILLED ORAL 2 TIMES DAILY
Status: DISCONTINUED | OUTPATIENT
Start: 2024-07-11 | End: 2024-07-15 | Stop reason: HOSPADM

## 2024-07-11 RX ORDER — ALBUTEROL SULFATE 0.83 MG/ML
2.5 SOLUTION RESPIRATORY (INHALATION) EVERY 6 HOURS PRN
Status: DISCONTINUED | OUTPATIENT
Start: 2024-07-11 | End: 2024-07-15 | Stop reason: HOSPADM

## 2024-07-11 RX ORDER — SENNOSIDES 8.6 MG/1
1 TABLET ORAL NIGHTLY
Status: DISCONTINUED | OUTPATIENT
Start: 2024-07-11 | End: 2024-07-13

## 2024-07-11 RX ORDER — HEPARIN SODIUM 10000 [USP'U]/100ML
0-4500 INJECTION, SOLUTION INTRAVENOUS CONTINUOUS
Status: DISCONTINUED | OUTPATIENT
Start: 2024-07-11 | End: 2024-07-12

## 2024-07-11 RX ORDER — LIDOCAINE HYDROCHLORIDE 20 MG/ML
INJECTION, SOLUTION EPIDURAL; INFILTRATION; INTRACAUDAL; PERINEURAL
Status: COMPLETED | OUTPATIENT
Start: 2024-07-11 | End: 2024-07-11

## 2024-07-11 RX ADMIN — ONDANSETRON 4 MG: 2 INJECTION INTRAMUSCULAR; INTRAVENOUS at 06:46

## 2024-07-11 RX ADMIN — ONDANSETRON 4 MG: 2 INJECTION INTRAMUSCULAR; INTRAVENOUS at 00:20

## 2024-07-11 RX ADMIN — DOCUSATE SODIUM 100 MG: 100 CAPSULE, LIQUID FILLED ORAL at 21:31

## 2024-07-11 RX ADMIN — SODIUM CHLORIDE 75 ML/HR: 9 INJECTION, SOLUTION INTRAVENOUS at 04:54

## 2024-07-11 RX ADMIN — HYDROMORPHONE HYDROCHLORIDE 0.2 MG: 0.2 INJECTION, SOLUTION INTRAMUSCULAR; INTRAVENOUS; SUBCUTANEOUS at 08:11

## 2024-07-11 RX ADMIN — ASPIRIN 81 MG CHEWABLE TABLET 81 MG: 81 TABLET CHEWABLE at 08:10

## 2024-07-11 RX ADMIN — SENNOSIDES 8.6 MG: 8.6 TABLET, FILM COATED ORAL at 21:30

## 2024-07-11 RX ADMIN — EZETIMIBE 10 MG: 10 TABLET ORAL at 08:10

## 2024-07-11 RX ADMIN — METRONIDAZOLE 500 MG: 500 INJECTION, SOLUTION INTRAVENOUS at 21:38

## 2024-07-11 RX ADMIN — LOSARTAN POTASSIUM 50 MG: 50 TABLET, FILM COATED ORAL at 21:30

## 2024-07-11 RX ADMIN — HUMAN ALBUMIN MICROSPHERES AND PERFLUTREN 0.5 ML: 10; .22 INJECTION, SOLUTION INTRAVENOUS at 14:30

## 2024-07-11 RX ADMIN — METRONIDAZOLE 500 MG: 500 INJECTION, SOLUTION INTRAVENOUS at 06:43

## 2024-07-11 RX ADMIN — OXYCODONE HYDROCHLORIDE 5 MG: 5 TABLET ORAL at 21:31

## 2024-07-11 RX ADMIN — HEPARIN SODIUM 1700 UNITS/HR: 10000 INJECTION, SOLUTION INTRAVENOUS at 16:00

## 2024-07-11 RX ADMIN — IOHEXOL 500 ML: 12 SOLUTION ORAL at 08:45

## 2024-07-11 RX ADMIN — POLYETHYLENE GLYCOL 3350 17 G: 17 POWDER, FOR SOLUTION ORAL at 16:21

## 2024-07-11 RX ADMIN — CLOPIDOGREL 75 MG: 75 TABLET ORAL at 08:10

## 2024-07-11 RX ADMIN — HYDROMORPHONE HYDROCHLORIDE 0.2 MG: 0.2 INJECTION, SOLUTION INTRAMUSCULAR; INTRAVENOUS; SUBCUTANEOUS at 03:22

## 2024-07-11 RX ADMIN — LIDOCAINE HYDROCHLORIDE 5 ML: 20 INJECTION, SOLUTION EPIDURAL; INFILTRATION; INTRACAUDAL; PERINEURAL at 14:46

## 2024-07-11 RX ADMIN — INSULIN LISPRO 6 UNITS: 100 INJECTION, SOLUTION INTRAVENOUS; SUBCUTANEOUS at 16:21

## 2024-07-11 RX ADMIN — ISOSORBIDE MONONITRATE 30 MG: 30 TABLET, EXTENDED RELEASE ORAL at 08:10

## 2024-07-11 RX ADMIN — IOHEXOL 75 ML: 350 INJECTION, SOLUTION INTRAVENOUS at 11:08

## 2024-07-11 RX ADMIN — CEFTRIAXONE SODIUM 2 G: 2 INJECTION, SOLUTION INTRAVENOUS at 06:09

## 2024-07-11 RX ADMIN — METRONIDAZOLE 500 MG: 500 INJECTION, SOLUTION INTRAVENOUS at 15:09

## 2024-07-11 RX ADMIN — INSULIN LISPRO 2 UNITS: 100 INJECTION, SOLUTION INTRAVENOUS; SUBCUTANEOUS at 08:12

## 2024-07-11 ASSESSMENT — COGNITIVE AND FUNCTIONAL STATUS - GENERAL
MOBILITY SCORE: 22
CLIMB 3 TO 5 STEPS WITH RAILING: A LITTLE
DAILY ACTIVITIY SCORE: 24
WALKING IN HOSPITAL ROOM: A LITTLE

## 2024-07-11 ASSESSMENT — PAIN DESCRIPTION - LOCATION: LOCATION: ABDOMEN

## 2024-07-11 ASSESSMENT — PAIN - FUNCTIONAL ASSESSMENT
PAIN_FUNCTIONAL_ASSESSMENT: 0-10

## 2024-07-11 ASSESSMENT — PAIN SCALES - GENERAL
PAINLEVEL_OUTOF10: 0 - NO PAIN
PAINLEVEL_OUTOF10: 5 - MODERATE PAIN
PAINLEVEL_OUTOF10: 7
PAINLEVEL_OUTOF10: 7
PAINLEVEL_OUTOF10: 0 - NO PAIN
PAINLEVEL_OUTOF10: 4
PAINLEVEL_OUTOF10: 0 - NO PAIN

## 2024-07-11 ASSESSMENT — PAIN SCALES - WONG BAKER: WONGBAKER_NUMERICALRESPONSE: NO HURT

## 2024-07-11 NOTE — PROGRESS NOTES
Jose Olivia [id number]   Service: Internal Medicine / Hospitalist Date of service: 7/11/2024                                   Full Code         Overnight Events: No new overnight events reported by patient or nursing.      Subjective        History Of Present Illness  Jose Olivia is a 68 y.o. male with past medical history of HTN, CAD, mild COPD, DM with diabetic peripheral neuropathy, hyperlipidemia, mild renal insufficiency and CVA 2018 (on aspirin and Plavix since that time) comes to the hospital with abdominal pain.  The patient states that his pain began yesterday morning while he was working on his water heater.  According the patient the pain moved from the middle of the abdomen more to the right.  He reported some nausea and vomiting.  He also reported some diarrhea.  He reported some chills and sweats but denies any chest pain or shortness of breath.  Initially the patient wanted to leave AMA to finish his water heater work but then later decided to stay for surgical intervention.  At this time the patient states his abdominal pain is improved and he is awaiting to go to the OR for surgery later today.         7/8.................No reported: Chest pains, dyspnea, orthopnea, palpitations, fevers or chills.Patient reported that overall he was doing well given recent surgery.  He endorsed some abdominal pain related to recent surgery.     7/9..............The patient reported feeling better today no reported : nausea, vomiting, chest pains or palpitations.The patient felt that his postop related abdominal pain was tolerable.        7/10................No reported: Palpitations, headaches, chest pains, fevers, chills, nausea or vomiting.  Patient endorsed constipation.    7/11.......................  No reported: Syncope, presyncope, chest pains, nausea, vomiting, palpitations or tremors.However patient did endorse wheezing and shortness of breath this morning.     Review of Systems:   Review of system  otherwise negative if not aforementioned above in subjective.     Objective           Physical Exam      Constitutional:       Appearance: Patient appeared in no acute cardiopulmonary distress.     Comments: Patient alert and oriented to person place time and situation.  HEENT:      Head: Normocephalic and atraumatic.Trachea midline      Nose:No observed congestion or rhinorrhea.     Mouth/Throat: Mucous membranes Moist, Trachea appeared  midline.  Eyes:      Extraocular Movements: Extraocular movements intact.      Pupils: Pupils are equal, round, and reactive to light.      Comments: No scleral icterus or conjunctival injection appreciated.   Cardiovascular:      Rate and Rhythm: Irregular rate and rhythm.No peripheral stigmata of endocarditis appreciated.     Pulmonary:      Lungs appeared clear to auscultation, no adventitious sound appreciated.  Abdominal:      General: Status post surgical intervention, abdomen nondistended, hypoactive bowel sounds, no involuntary guarding or rebound tenderness appreciated.     Comments: Point tenderness on palpation.  Musculoskeletal:       Patient appeared to have full active range of motion for upper and lower extremities, no acute apparent joint deformity appreciated on examination.   No pitting edema or cyanosis appreciated.       Lymphadenopathy:      No appreciable palpable lymphadenopathy  Skin:     General: Skin is warm.      Coloration:  No jaundice     Findings: No Pandey Banks's or Story signs appreciated.  No appreciation of erythema or signs of infectious process related to surgical site/Laparoscopic incisions,dressing removed today..  Neurological:      General: No focal sensory or motor deficits appreciated, no meningeal signs or dysmetria noted.      Cranial Nerves: Cranial nerves II to XII appearing grossly intact.     Genitals:  Deferred  Psychiatric:         The patient appears to be displaying normal mood and affect at the time of evaluation.     Labs:               Lab Results   Component Value Date     GLUCOSE 156 (H) 07/10/2024     CALCIUM 8.5 (L) 07/10/2024      (L) 07/10/2024     K 3.9 07/10/2024     CO2 21 07/10/2024      07/10/2024     BUN 25 (H) 07/10/2024     CREATININE 1.32 (H) 07/10/2024            Lab Results   Component Value Date     WBC 12.3 (H) 07/10/2024     HGB 14.6 07/10/2024     HCT 42.4 07/10/2024     MCV 97 07/10/2024      07/10/2024      Lab Results   Component Value Date    GLUCOSE 179 (H) 07/11/2024    CALCIUM 7.2 (L) 07/11/2024     (L) 07/11/2024    K 4.4 07/11/2024    CO2 14 (L) 07/11/2024     (H) 07/11/2024    BUN 23 07/11/2024    CREATININE 0.96 07/11/2024      Lab Results   Component Value Date    WBC 12.5 (H) 07/11/2024    HGB 15.2 07/11/2024    HCT 43.7 07/11/2024    MCV 96 07/11/2024     07/11/2024      [unfilled]   [unfilled]   No results found for the last 90 days.                  X-rays/ Images     [unfilled]   Radiology Results (last 21 days)     Procedure Component Value Units Date/Time   CT abdomen pelvis w IV contrast [[id number]] Collected: 07/07/24 0400   Order Status: Completed Updated: 07/07/24 0400   Narrative:     Interpreted By:  Corina Farmer,  STUDY:  CT ABDOMEN PELVIS W IV CONTRAST;  7/7/2024 3:45 am      INDICATION:  Signs/Symptoms:RLQ tenderness guarding.      COMPARISON:  CT scan of the abdomen pelvis 09/19/2011      ACCESSION NUMBER(S):  [id number]      ORDERING CLINICIAN:  LIVAN FLORES      TECHNIQUE:  Axial CT images of the abdomen and pelvis with coronal and sagittal  reconstructed images obtained after intravenous administration of 75  mL of Omnipaque 350      FINDINGS:  LOWER CHEST: Peripheral reticular opacities in the lung bases likely  relate to chronic fibrotic changes. Calcified granulomas noted right  lung base. Aortic root, mitral annular and coronary artery  calcifications noted.      ABDOMEN:      LIVER: Morphology. Hepatic steatosis.  BILE DUCTS:  Normal caliber.  GALLBLADDER: No calcified gallstones. No wall thickening.  PANCREAS: Mild fatty atrophy of the pancreas.  SPLEEN: Within normal limits.  Accessory splenule noted.  ADRENALS: Within normal limits.  KIDNEYS and URETERS: Symmetric renal enhancement. No hydronephrosis  or hydroureter. There is a 3 mm calculus in the lower pole of the  right kidney. Subcentimeter hypodense foci bilaterally are too small  to characterize. Bilateral perinephric stranding is nonspecific and  could be age related.      VESSELS:  Calcific atherosclerosis of the aortoiliac and mesenteric  vessels. No aortic aneurysm. RETROPERITONEUM: No pathologically  enlarged retroperitoneal lymph nodes.      PELVIS:      REPRODUCTIVE ORGANS: Prostate gland is enlarged protruding into the  base of the bladder. BLADDER: Bladder is moderately distended with  mild wall thickening.      BOWEL: No dilated bowel. Appendix is dilated measuring up to 12 mm.  There is an appendicolith of the base of the appendix. There is wall  thickening and surrounding inflammatory changes consistent with acute  appendicitis. No evidence for microperforation or abscess formation  at this time. Mild bowel wall thickening involving several loops of  small bowel in the right lower quadrant, likely reactive no  pneumatosis or portal venous gas. A few scattered colonic diverticula  without evidence for acute diverticulitis. PERITONEUM: No ascites or  free air, no fluid collection.      ABDOMINAL WALL: Bilateral inguinal hernias containing fat. Small  umbilical hernia contains fat. BONES: Multilevel degenerative changes  of the spine.       Impression:     Dilated appendix measuring 12 mm with periappendiceal inflammatory  changes and wall thickening consistent with acute appendicitis. No  evidence for microperforation or abscess formation. Surgical  consultation is advised.      Mild bowel wall thickening involving several loops of bowel in the  right lower quadrant,  likely reactive. Diverticulosis without  evidence for acute diverticulitis.      Prostatomegaly with mild bladder wall thickening which may relate to  under distention or sequela of chronic outlet obstruction. Correlate  with urinalysis to exclude infectious cystitis.      Nonobstructing right renal calculus.      Additional findings as described above.      MACRO:  None      Signed by: Corina Farmer 7/7/2024 3:59 AM  Dictation workstation:   [id number]                  Medical Problems         Problem List         * (Principal) Acute appendicitis with localized peritonitis, without perforation, abscess, or gangrene     Abnormal EKG     Abnormal stress test     Acute carpal tunnel syndrome, right     Acute sinusitis     Allergic rhinitis     Benign essential hypertension     Bilateral tinnitus     CAD (coronary artery disease)     Cervical vertigo     Chronic cough     Chronic glossitis     Claudication, intermittent (CMS-HCC)     Contact with and (suspected) exposure to covid-19     COPD, mild (Multi)     Mild chronic obstructive pulmonary disease (Multi)     Diabetes mellitus (Multi)     Diabetic peripheral neuropathy (Multi)     Disease suspected     Dizziness     Dry skin     Elevated serum creatinine     Fatigue     Hemiplegia (Multi)     History of elevated lipids     History of hypertension     HLD (hyperlipidemia)     Hyperkalemia     Hypogonadism in male     Imbalance     Insomnia     Left hemiparesis (Multi)     Low testosterone in male     Mild renal insufficiency     Nasal valve blockage     Numbness     Obstructive sleep apnea syndrome     Onychomycosis     Spondylosis of cervical spine     Osteoarthritis of spine with radiculopathy, cervical region     Left knee pain     Pain and swelling of lower leg, left     Pain of foot     Right hand paresthesia     Right hand weakness     Shortness of breath     Stroke (Multi)     Tinea corporis     Ulnar neuropathy at elbow, right                  Above  medical problems may be reflective of historical medical problems that may have resolved and may not related to acute clinical condition/medical problems.     Clinical impression/plan:     Principal Problem:    Acute appendicitis with localized peritonitis, without perforation, abscess, or gangrene        Acute appendicitis, complicated by localized peritonitis status post appendectomy   -Finding on CAT scan concerning for acute appendicitis.  Surgery has been consulted plan 4 OR today.  -Leukocytosis secondary appendicitis.  Continue Zosyn.       Atrial fibrillation    Atrial fibrillation in the postoperative setting, anticoagulation recommended by cardiology  HELLEN with underlying CKD  -Creatinine 1.67 on admission.  Continue IV fluid hydration  -Avoid nephrotoxins check renal functions in AM.     Diabetes mellitus  -Continue sliding scale insulin last A1c is from 2022 shows A1c of 6.5  -Recheck A1c     Coronary artery disease  -Continue with aspirin and Plavix postoperatively once okay with surgery.     Hypertension  -Holding losartan in setting of HELLEN resume once renal functions improved.     COPD  -Currently stable     Disposition/additional care plan/interventions: 7/10/2024     Transitioned to gram-negative coverage with Rocephin and anaerobic coverage and Flagyl as per infectious disease, continue stewardship as per ID....................Duration of antibiotics as per ID.         Nonacute appearing abdomen, hypoactive, constipation persists, MiraLAX added it appeared by the surgical service .     Will add IV fluids at this time.     Avoid opioids when possible     Nonacute appearing abdomen while constipation is suspected, may benefit from a CT scan of the abdomen and pelvis to rule out other concomitant pathology including but limited to manifestation of peritonitis/abscess.....................will defer to surgical service at this time.     Continue supportive care  Possible transition to oral therapy to  cover anaerobic and gram-negative pathogen at the time of discharge, will defer to ID.     Monitor fever curve     Monitor leukocytosis.       Disposition/additional care plan/treatment interventions: 7/11/2024      Recommendation for anticoagulation once cleared by surgery, appreciate input from Dr. Murdock  New onset atrial fibrillation.    Discussed heparin therapy with general surgery, workup for suspected intraperitoneal abscess with CTA and possible drainage.  Patient with heparin drip postprocedure.  Reported history of CVA in the past.    Continue monitor atrial fibrillation, acceptable ventricular response at this juncture, rate control therapy may be needed.    Monitor electrolytes    Bladder scan to evaluate for urinary retention    Clinically doubt bowel obstruction at this time.  Continue laxatives/stool softeners    Point tenderness on abdominal examination.................Plan CT scan of the abdomen to evaluate for abscess    Continue anaerobic and gram-negative intravenous antibiotic coverage.    Reported history of bradycardia.      Reported COPD did not appear in exacerbation at the time of evaluation.  Continue longitudinal therapy, beta agonist.    Continue monitor ventricular rate............... however reported history of bradycardia.    Last  TSH 3.52 years ago, will check TSH           The patient was informed of differential diagnosis , work up , plan of care and possible sequelae of clinical disposition.Patient in agreement with plan of care. Further recommendations forthcoming in accordance with patient's clinical disposition and response to care.     Discharge planning:Discharge timing to be determined, in accordance recommendation as well.  From her surgical service.     Care time: > 55  mins              Dictation performed with assistance of voice recognition device therefore transcription errors are possible.

## 2024-07-11 NOTE — POST-PROCEDURE NOTE
Interventional Radiology Brief Postprocedure Note    Attending: Marcial Andujar MD      Assistant: none    Diagnosis: rlq fluid collection    Description of procedure: fluid aspiration     Anesthesia:  Local    Complications: None    Estimated Blood Loss: none      specimens collected  10ml simple yellowish fluid removed. No gross purulence.   Samples sent for culture    See detailed result report with images in PACS.    The patient tolerated the procedure well without incident or complication.

## 2024-07-11 NOTE — CARE PLAN
The patient's goals for the shift include stay informed     The clinical goals for the shift include patient will remain hemodynamically stable throughout the shift    Over the shift, the patient did make progress toward the following goals. Barriers to progression include understanding. Recommendations to address these barriers include education.

## 2024-07-11 NOTE — PROGRESS NOTES
[facility] INFECTIOUS DISEASE PROGRESS NOTE    Patient Name: Jose Olivia  MRN: [id number]    INTERVAL HISTORY:   No fevers. WBC are better. Complains of abx pain wendy w coughing    Patient Active Problem List   Diagnosis    Abnormal EKG    Abnormal stress test    Acute carpal tunnel syndrome, right    Acute sinusitis    Allergic rhinitis    Benign essential hypertension    Bilateral tinnitus    CAD (coronary artery disease)    Cervical vertigo    Chronic cough    Chronic glossitis    Claudication, intermittent (CMS-HCC)    Contact with and (suspected) exposure to covid-19    COPD, mild (Multi)    Mild chronic obstructive pulmonary disease (Multi)    Diabetes mellitus (Multi)    Diabetic peripheral neuropathy (Multi)    Disease suspected    Dizziness    Dry skin    Elevated serum creatinine    Fatigue    Hemiplegia (Multi)    History of elevated lipids    History of hypertension    HLD (hyperlipidemia)    Hyperkalemia    Hypogonadism in male    Imbalance    Insomnia    Left hemiparesis (Multi)    Low testosterone in male    Mild renal insufficiency    Nasal valve blockage    Numbness    Obstructive sleep apnea syndrome    Onychomycosis    Spondylosis of cervical spine    Osteoarthritis of spine with radiculopathy, cervical region    Left knee pain    Pain and swelling of lower leg, left    Pain of foot    Right hand paresthesia    Right hand weakness    Shortness of breath    Stroke (Multi)    Tinea corporis    Ulnar neuropathy at elbow, right    Acute appendicitis with localized peritonitis, without perforation, abscess, or gangrene        ASSESSMENT:   Perf Appendicitis  Localized peritonitis  HELLEN on CKD 3  DM2   CAD  COPD  HTN     Plan   Continue Ceftriaxone and Flagyl  Follow cultures  Check Bcx x 2 w fevers   Serial abd exams    If advances with diet then discharge plan will be on oral Omnicef and Flagyl to complete 10 days of therapy postoperatively.  Repeat imaging may be warranted for worsening symptoms,  leukocytosis or adverse abdominal examination    MEDICATIONS: reviewed.    Current Facility-Administered Medications:     acetaminophen (Tylenol) tablet 650 mg, 650 mg, oral, q4h PRN, Woody Chan MD, 650 mg at 07/10/24 1209    aspirin chewable tablet 81 mg, 81 mg, oral, Daily, Gilles Hobson MD, 81 mg at 07/11/24 0810    cefTRIAXone (Rocephin) 2 g in dextrose 5% in water (D5W) 50 mL, 2 g, intravenous, q24h, Hamlet Wolff MD, Stopped at 07/11/24 0639    clopidogrel (Plavix) tablet 75 mg, 75 mg, oral, Daily, Jori Blanco MD, 75 mg at 07/11/24 0810    dextrose 50 % injection 12.5 g, 12.5 g, intravenous, q15 min PRN, Gilles Hobson MD    dextrose 50 % injection 25 g, 25 g, intravenous, q15 min PRN, Gilles Hobson MD    ezetimibe (Zetia) tablet 10 mg, 10 mg, oral, Daily, Gilles Hobson MD, 10 mg at 07/11/24 0810    glucagon (Glucagen) injection 1 mg, 1 mg, intramuscular, q15 min PRN, Gilles Hobson MD    glucagon (Glucagen) injection 1 mg, 1 mg, intramuscular, q15 min PRN, Gilles Hobson MD    HYDROmorphone PF (Dilaudid) injection 0.2 mg, 0.2 mg, intravenous, q3h PRN, Riley Reyes DO, 0.2 mg at 07/11/24 0322    insulin lispro (HumaLOG) injection 0-10 Units, 0-10 Units, subcutaneous, TID, Gilles Hobson MD, 4 Units at 07/10/24 1207    iohexol (OMNIPaque) 12 mg iodine/mL oral contrast 500 mL, 500 mL, oral, Once in imaging, Woody Chan MD    isosorbide mononitrate ER (Imdur) 24 hr tablet 30 mg, 30 mg, oral, Daily, Gilles Hobson MD, 30 mg at 07/10/24 0949    losartan (Cozaar) tablet 50 mg, 50 mg, oral, Daily, Gilles Hobson MD, 50 mg at 07/10/24 2142    metroNIDAZOLE (Flagyl) 500 mg in NaCl (iso-os) 100 mL, 500 mg, intravenous, q8h, Hamlet Wolff MD, Stopped at 07/11/24 0743    ondansetron (Zofran) injection 4 mg, 4 mg, intravenous, q6h PRN, Gilles Hobson MD, 4 mg at 07/11/24 0646    oxyCODONE (Roxicodone) immediate release tablet 10 mg, 10 mg, oral, q4h PRN, Woody Chan MD, 10 mg at 07/10/24 2143    oxyCODONE (Roxicodone)  immediate release tablet 5 mg, 5 mg, oral, q4h PRN, Woody Chan MD    polyethylene glycol (Glycolax, Miralax) packet 17 g, 17 g, oral, Daily PRN, Jaspal Condon DO, 17 g at 07/10/24 0949    sodium chloride 0.9% infusion, 75 mL/hr, intravenous, Continuous, Jaspal Condon DO, Last Rate: 75 mL/hr at 24 0454, 75 mL/hr at 24 0454     PHYSICAL EXAM:  Vital signs: BP (!) 178/95 (BP Location: Left arm, Patient Position: Sitting)   Pulse 74   Temp 36.3 °C (97.4 °F) (Temporal)   Resp 18   Ht 1.829 m (6')   Wt 94.4 kg (208 lb 1.8 oz)   SpO2 97%   BMI 28.23 kg/m²   Temp (24hrs), Av.7 °C (98.1 °F), Min:36.3 °C (97.4 °F), Max:37.1 °C (98.8 °F)    General: alert, oriented, NAD  Lungs: bilaterally clear to auscultation  Heart: regular rate and rhythm  Abdomen: soft, + tender, non distended, BS+  Extremities: no edema  No rashes  No joint inflammation  Neck supple  Lines ok  No CVAT    Labs:    Results for orders placed or performed during the hospital encounter of 24 (from the past 96 hour(s))   POCT GLUCOSE   Result Value Ref Range    POCT Glucose 207 (H) 74 - 99 mg/dL   Tissue/Wound Culture/Smear    Specimen: Other (specify in comments); Tissue/Biopsy   Result Value Ref Range    Tissue/Wound Culture/Smear (4+) Abundant Mixed Gram-Negative Bacteria     Tissue/Wound Culture/Smear (4+) Abundant Mixed Anaerobic Bacteria     Beta Lactamase (Cefinase) Positive     Gram Stain (4+) Abundant Polymorphonuclear leukocytes (A)     Gram Stain (A)      (3+) Moderate Mixed Gram positive and Gram negative bacteria   POCT GLUCOSE   Result Value Ref Range    POCT Glucose 156 (H) 74 - 99 mg/dL   POCT GLUCOSE   Result Value Ref Range    POCT Glucose 177 (H) 74 - 99 mg/dL   POCT GLUCOSE   Result Value Ref Range    POCT Glucose 196 (H) 74 - 99 mg/dL   CBC and Auto Differential   Result Value Ref Range    WBC 16.6 (H) 4.4 - 11.3 x10*3/uL    nRBC 0.0 0.0 - 0.0 /100 WBCs    RBC 4.55 4.50 - 5.90 x10*6/uL     Hemoglobin 15.2 13.5 - 17.5 g/dL    Hematocrit 44.4 41.0 - 52.0 %    MCV 98 80 - 100 fL    MCH 33.4 26.0 - 34.0 pg    MCHC 34.2 32.0 - 36.0 g/dL    RDW 13.1 11.5 - 14.5 %    Platelets 217 150 - 450 x10*3/uL    Neutrophils % 91.0 40.0 - 80.0 %    Immature Granulocytes %, Automated 0.5 0.0 - 0.9 %    Lymphocytes % 3.8 13.0 - 44.0 %    Monocytes % 4.5 2.0 - 10.0 %    Eosinophils % 0.0 0.0 - 6.0 %    Basophils % 0.2 0.0 - 2.0 %    Neutrophils Absolute 15.06 (H) 1.20 - 7.70 x10*3/uL    Immature Granulocytes Absolute, Automated 0.09 0.00 - 0.70 x10*3/uL    Lymphocytes Absolute 0.63 (L) 1.20 - 4.80 x10*3/uL    Monocytes Absolute 0.75 0.10 - 1.00 x10*3/uL    Eosinophils Absolute 0.00 0.00 - 0.70 x10*3/uL    Basophils Absolute 0.03 0.00 - 0.10 x10*3/uL   Basic Metabolic Panel   Result Value Ref Range    Glucose 158 (H) 74 - 99 mg/dL    Sodium 136 136 - 145 mmol/L    Potassium 4.6 3.5 - 5.3 mmol/L    Chloride 105 98 - 107 mmol/L    Bicarbonate 22 21 - 32 mmol/L    Anion Gap 14 10 - 20 mmol/L    Urea Nitrogen 32 (H) 6 - 23 mg/dL    Creatinine 1.64 (H) 0.50 - 1.30 mg/dL    eGFR 45 (L) >60 mL/min/1.73m*2    Calcium 8.9 8.6 - 10.3 mg/dL   POCT GLUCOSE   Result Value Ref Range    POCT Glucose 132 (H) 74 - 99 mg/dL   POCT GLUCOSE   Result Value Ref Range    POCT Glucose 257 (H) 74 - 99 mg/dL   POCT GLUCOSE   Result Value Ref Range    POCT Glucose 166 (H) 74 - 99 mg/dL   POCT GLUCOSE   Result Value Ref Range    POCT Glucose 82 74 - 99 mg/dL   CBC   Result Value Ref Range    WBC 12.0 (H) 4.4 - 11.3 x10*3/uL    nRBC 0.0 0.0 - 0.0 /100 WBCs    RBC 4.16 (L) 4.50 - 5.90 x10*6/uL    Hemoglobin 13.6 13.5 - 17.5 g/dL    Hematocrit 40.9 (L) 41.0 - 52.0 %    MCV 98 80 - 100 fL    MCH 32.7 26.0 - 34.0 pg    MCHC 33.3 32.0 - 36.0 g/dL    RDW 13.0 11.5 - 14.5 %    Platelets 182 150 - 450 x10*3/uL   Basic Metabolic Panel   Result Value Ref Range    Glucose 105 (H) 74 - 99 mg/dL    Sodium 136 136 - 145 mmol/L    Potassium 4.1 3.5 - 5.3 mmol/L     Chloride 105 98 - 107 mmol/L    Bicarbonate 26 21 - 32 mmol/L    Anion Gap 9 (L) 10 - 20 mmol/L    Urea Nitrogen 29 (H) 6 - 23 mg/dL    Creatinine 1.53 (H) 0.50 - 1.30 mg/dL    eGFR 49 (L) >60 mL/min/1.73m*2    Calcium 8.2 (L) 8.6 - 10.3 mg/dL   POCT GLUCOSE   Result Value Ref Range    POCT Glucose 107 (H) 74 - 99 mg/dL   POCT GLUCOSE   Result Value Ref Range    POCT Glucose 98 74 - 99 mg/dL   POCT GLUCOSE   Result Value Ref Range    POCT Glucose 166 (H) 74 - 99 mg/dL   POCT GLUCOSE   Result Value Ref Range    POCT Glucose 147 (H) 74 - 99 mg/dL   Basic Metabolic Panel   Result Value Ref Range    Glucose 156 (H) 74 - 99 mg/dL    Sodium 133 (L) 136 - 145 mmol/L    Potassium 3.9 3.5 - 5.3 mmol/L    Chloride 102 98 - 107 mmol/L    Bicarbonate 21 21 - 32 mmol/L    Anion Gap 14 10 - 20 mmol/L    Urea Nitrogen 25 (H) 6 - 23 mg/dL    Creatinine 1.32 (H) 0.50 - 1.30 mg/dL    eGFR 58 (L) >60 mL/min/1.73m*2    Calcium 8.5 (L) 8.6 - 10.3 mg/dL   CBC   Result Value Ref Range    WBC 12.3 (H) 4.4 - 11.3 x10*3/uL    nRBC 0.0 0.0 - 0.0 /100 WBCs    RBC 4.38 (L) 4.50 - 5.90 x10*6/uL    Hemoglobin 14.6 13.5 - 17.5 g/dL    Hematocrit 42.4 41.0 - 52.0 %    MCV 97 80 - 100 fL    MCH 33.3 26.0 - 34.0 pg    MCHC 34.4 32.0 - 36.0 g/dL    RDW 12.6 11.5 - 14.5 %    Platelets 186 150 - 450 x10*3/uL   Magnesium   Result Value Ref Range    Magnesium 1.72 1.60 - 2.40 mg/dL   POCT GLUCOSE   Result Value Ref Range    POCT Glucose 181 (H) 74 - 99 mg/dL   POCT GLUCOSE   Result Value Ref Range    POCT Glucose 233 (H) 74 - 99 mg/dL   POCT GLUCOSE   Result Value Ref Range    POCT Glucose 142 (H) 74 - 99 mg/dL   POCT GLUCOSE   Result Value Ref Range    POCT Glucose 242 (H) 74 - 99 mg/dL   Basic Metabolic Panel   Result Value Ref Range    Glucose 179 (H) 74 - 99 mg/dL    Sodium 135 (L) 136 - 145 mmol/L    Potassium 4.4 3.5 - 5.3 mmol/L    Chloride 110 (H) 98 - 107 mmol/L    Bicarbonate 14 (L) 21 - 32 mmol/L    Anion Gap 15 10 - 20 mmol/L    Urea Nitrogen  23 6 - 23 mg/dL    Creatinine 0.96 0.50 - 1.30 mg/dL    eGFR 86 >60 mL/min/1.73m*2    Calcium 7.2 (L) 8.6 - 10.3 mg/dL   CBC   Result Value Ref Range    WBC 12.5 (H) 4.4 - 11.3 x10*3/uL    nRBC 0.0 0.0 - 0.0 /100 WBCs    RBC 4.57 4.50 - 5.90 x10*6/uL    Hemoglobin 15.2 13.5 - 17.5 g/dL    Hematocrit 43.7 41.0 - 52.0 %    MCV 96 80 - 100 fL    MCH 33.3 26.0 - 34.0 pg    MCHC 34.8 32.0 - 36.0 g/dL    RDW 12.5 11.5 - 14.5 %    Platelets 206 150 - 450 x10*3/uL        Microbiology data: reviewed    Imaging data: reviewed      Hamlet Wolff  Pager:[phone]

## 2024-07-11 NOTE — CARE PLAN
Problem: Pain - Adult  Goal: Verbalizes/displays adequate comfort level or baseline comfort level  Outcome: Progressing     Problem: Safety - Adult  Goal: Free from fall injury  Outcome: Progressing     Problem: Discharge Planning  Goal: Discharge to home or other facility with appropriate resources  Outcome: Progressing     Problem: Chronic Conditions and Co-morbidities  Goal: Patient's chronic conditions and co-morbidity symptoms are monitored and maintained or improved  Outcome: Progressing     Problem: Diabetes  Goal: Achieve decreasing blood glucose levels by end of shift  Outcome: Progressing  Goal: Increase stability of blood glucose readings by end of shift  Outcome: Progressing  Goal: Decrease in ketones present in urine by end of shift  Outcome: Progressing  Goal: Maintain electrolyte levels within acceptable range throughout shift  Outcome: Progressing  Goal: Maintain glucose levels >70mg/dl to <250mg/dl throughout shift  Outcome: Progressing  Goal: No changes in neurological exam by end of shift  Outcome: Progressing  Goal: Learn about and adhere to nutrition recommendations by end of shift  Outcome: Progressing  Goal: Vital signs within normal range for age by end of shift  Outcome: Progressing  Goal: Increase self care and/or family involovement by end of shift  Outcome: Progressing  Goal: Receive DSME education by end of shift  Outcome: Progressing     Problem: Pain  Goal: Takes deep breaths with improved pain control throughout the shift  Outcome: Progressing  Goal: Turns in bed with improved pain control throughout the shift  Outcome: Progressing  Goal: Walks with improved pain control throughout the shift  Outcome: Progressing  Goal: Performs ADL's with improved pain control throughout shift  Outcome: Progressing  Goal: Participates in PT with improved pain control throughout the shift  Outcome: Progressing  Goal: Free from opioid side effects throughout the shift  Outcome: Progressing  Goal:  Free from acute confusion related to pain meds throughout the shift  Outcome: Progressing     Problem: Fall/Injury  Goal: Not fall by end of shift  Outcome: Progressing  Goal: Be free from injury by end of the shift  Outcome: Progressing  Goal: Verbalize understanding of personal risk factors for fall in the hospital  Outcome: Progressing  Goal: Verbalize understanding of risk factor reduction measures to prevent injury from fall in the home  Outcome: Progressing  Goal: Use assistive devices by end of the shift  Outcome: Progressing  Goal: Pace activities to prevent fatigue by end of the shift  Outcome: Progressing

## 2024-07-11 NOTE — CONSULTS
Inpatient consult to Cardiology  Consult performed by: Diogo Murdock MD  Consult ordered by: Riley Reyes DO        History Of Present Illness:    Jose Olivia is a 68 y.o. male with past medical history of HTN, CAD, mild COPD, DM with diabetic peripheral neuropathy, hyperlipidemia, mild renal insufficiency and CVA 2018 (on aspirin and Plavix since that time) comes to the hospital with abdominal pain. The patient states that his pain began yesterday morning while he was working on his water heater. According the patient the pain moved from the middle of the abdomen more to the right. He reported some nausea and vomiting. He also reported some diarrhea. He reported some chills and sweats but denies any chest pain or shortness of breath. Initially the patient wanted to leave AMA to finish his water heater work but then later decided to stay for surgical intervention.   He is s/p appendectomy. He was noted to be in atrial fib on monitor. On presentation he had bradycardia    Last Recorded Vitals:  Vitals:    07/10/24 2011 07/11/24 0011 07/11/24 0350 07/11/24 0801   BP: 165/79 175/87 177/71 (!) 178/95   BP Location: Right arm Left arm Left arm Left arm   Patient Position: Lying Lying Lying Sitting   Pulse: 71 77 73 74   Resp: 20 20 18 18   Temp: 36.4 °C (97.5 °F) 36.6 °C (97.9 °F) 36.6 °C (97.9 °F) 36.3 °C (97.4 °F)   TempSrc: Temporal Temporal Temporal Temporal   SpO2: 96% 95% 94% 97%   Weight:   94.4 kg (208 lb 1.8 oz)    Height:           Last Labs:  CBC - 7/11/2024:  4:55 AM  12.5 15.2 206    43.7      CMP - 7/11/2024:  4:55 AM  7.2 6.4 14 --- 1.3   _ 3.8 19 68      PTT - No results in last year.  _   _ _     Troponin I   Date/Time Value Ref Range Status   05/19/2022 03:35 AM 12 0 - 20 ng/L Final     Comment:     .  Less than 99th percentile of normal range cutoff-  Female and children under 18 years old <14 ng/L; Male <21 ng/L: Negative  Repeat testing should be performed if clinically indicated.   .  Female and  children under 18 years old 14-50 ng/L; Male 21-50 ng/L:  Consistent with possible cardiac damage and possible increased clinical   risk. Serial measurements may help to assess extent of myocardial damage.   .  >50 ng/L: Consistent with cardiac damage, increased clinical risk and  myocardial infarction. Serial measurements may help assess extent of   myocardial damage.   .   NOTE: Children less than 1 year old may have higher baseline troponin   levels and results should be interpreted in conjunction with the overall   clinical context.   .  NOTE: Troponin I testing is performed using a different   testing methodology at [facility] than at other   [facility]. Direct result comparisons should only   be made within the same method.     05/18/2022 10:59 AM 10 0 - 20 ng/L Final     Comment:     .  Less than 99th percentile of normal range cutoff-  Female and children under 18 years old <14 ng/L; Male <21 ng/L: Negative  Repeat testing should be performed if clinically indicated.   .  Female and children under 18 years old 14-50 ng/L; Male 21-50 ng/L:  Consistent with possible cardiac damage and possible increased clinical   risk. Serial measurements may help to assess extent of myocardial damage.   .  >50 ng/L: Consistent with cardiac damage, increased clinical risk and  myocardial infarction. Serial measurements may help assess extent of   myocardial damage.   .   NOTE: Children less than 1 year old may have higher baseline troponin   levels and results should be interpreted in conjunction with the overall   clinical context.   .  NOTE: Troponin I testing is performed using a different   testing methodology at [facility] than at other   [facility]. Direct result comparisons should only   be made within the same method.       BNP   Date/Time Value Ref Range Status   05/19/2022 03:35  (H) 0 - 99 pg/mL Final     Comment:     .  <100 pg/mL - Heart failure unlikely  100-299  pg/mL - Intermediate probability of acute heart  .               failure exacerbation. Correlate with clinical  .               context and patient history.    >=300 pg/mL - Heart Failure likely. Correlate with clinical  .               context and patient history.  BNP testing is performed using different testing   methodology at [facility] than at other   [facility]. Direct result comparisons should   only be made within the same method.     12/31/2018 12:00 AM 33 0 - 99 pg/mL Final     Comment:     .  <100 pg/mL - Heart failure unlikely  100-299 pg/mL - Intermediate probability of acute heart  .               failure exacerbation. Correlate with clinical  .               context and patient history.    >=300 pg/mL - Heart Failure likely. Correlate with clinical  .               context and patient history.  BNP testing is performed using different testing   methodology at [facility] than at other   [facility] hospitals. Direct result comparisons should   only be made within the same method.       Hemoglobin A1C   Date/Time Value Ref Range Status   07/07/2024 01:56 AM 6.9 (H) see below % Final   05/19/2022 03:35 AM 6.5 (A) % Final     Comment:          Diagnosis of Diabetes-Adults   Non-Diabetic: < or = 5.6%   Increased risk for developing diabetes: 5.7-6.4%   Diagnostic of diabetes: > or = 6.5%  .       Monitoring of Diabetes                Age (y)     Therapeutic Goal (%)   Adults:          >18           <7.0   Pediatrics:    13-18           <7.5                   7-12           <8.0                   0- 6            7.5-8.5   American Diabetes Association. Diabetes Care 33(S1), Jan 2010.     11/15/2019 06:17 PM 11.1 % Final     Comment:          Diagnosis of Diabetes-Adults   Non-Diabetic: < or = 5.6%   Increased risk for developing diabetes: 5.7-6.4%   Diagnostic of diabetes: > or = 6.5%  .       Monitoring of Diabetes                Age (y)     Therapeutic Goal (%)   Adults:       "    >18           <7.0   Pediatrics:    13-18           <7.5                   7-12           <8.0                   0- 6            7.5-8.5   American Diabetes Association. Diabetes Care 33(S1), Jan 2010.       VLDL   Date/Time Value Ref Range Status   05/19/2022 03:35 AM 41 (H) 0 - 40 mg/dL Final   04/29/2019 05:11 PM 36 0 - 40 mg/dL Final   01/16/2018 09:20 AM 48 (H) 0 - 40 mg/dL Final      Last I/O:  I/O last 3 completed shifts:  In: 1370 (14.5 mL/kg) [I.V.:920 (9.7 mL/kg); IV Piggyback:450]  Out: - (0 mL/kg)   Weight: 94.4 kg     Past Cardiology Tests (Last 3 Years):  EKG:  ECG 12 lead 07/07/2024 (Preliminary)      ECG 12 Lead 04/19/2024    Echo:  No results found for this or any previous visit from the past 1095 days.    Ejection Fractions:  No results found for: \"EF\"  Cath:  No results found for this or any previous visit from the past 1095 days.    Stress Test:  No results found for this or any previous visit from the past 1095 days.    Cardiac Imaging:  No results found for this or any previous visit from the past 1095 days.      Past Medical History:  He has a past medical history of Diabetes mellitus (Multi), Other conditions influencing health status, Personal history of other diseases of the circulatory system (03/08/2017), Personal history of other diseases of the respiratory system (03/08/2017), and Personal history of other endocrine, nutritional and metabolic disease (03/08/2017).    Past Surgical History:  He has a past surgical history that includes Prostate surgery (04/05/2016); Knee surgery (04/05/2016); Tonsillectomy (04/05/2016); MR angio neck wo IV contrast (5/18/2022); and MR angio head wo IV contrast (5/18/2022).      Social History:  He reports that he has quit smoking. His smoking use included cigarettes. He has never used smokeless tobacco. No history on file for alcohol use and drug use.    Family History:  No family history on file.     Allergies:  Patient has no known " "allergies.    Inpatient Medications:  Scheduled medications   Medication Dose Route Frequency    aspirin  81 mg oral Daily    cefTRIAXone  2 g intravenous q24h    clopidogrel  75 mg oral Daily    ezetimibe  10 mg oral Daily    insulin lispro  0-10 Units subcutaneous TID    iohexol  500 mL oral Once in imaging    isosorbide mononitrate ER  30 mg oral Daily    losartan  50 mg oral Daily    metroNIDAZOLE  500 mg intravenous q8h     PRN medications   Medication    acetaminophen    dextrose    dextrose    glucagon    glucagon    HYDROmorphone    ondansetron    oxyCODONE    oxyCODONE    polyethylene glycol     Continuous Medications   Medication Dose Last Rate    sodium chloride 0.9%  75 mL/hr 75 mL/hr (07/11/24 3701)     Outpatient Medications:  Current Outpatient Medications   Medication Instructions    amoxicillin-pot clavulanate (Augmentin) 875-125 mg tablet 1 tablet, oral, Every 12 hours    ascorbic acid (vitamin C) 1,000 mg, oral, 2 times daily    aspirin 81 mg chewable tablet 1 tablet, oral, Daily    blood sugar diagnostic, disc (Breeze 2 Test Strips) strip Dilma Breeze 2 Test DISK Quantity: 400 Refills: 0 Start : 24-Oct-2014 Active    cetirizine (ZYRTEC) 10 mg, oral, Daily    cholecalciferol (Vitamin D-3) 50 mcg (2,000 unit) capsule oral    clopidogrel (PLAVIX) 75 mg, oral, Daily    cyanocobalamin (Vitamin B-12) 500 mcg tablet oral    Easy Touch Insulin Syringe 0.5 mL 31 gauge x 5/16\" syringe     ezetimibe (ZETIA) 10 mg, oral, Daily    insulin NPH (Isophane) (HUMULIN N,NOVOLIN N) 25 Units, subcutaneous, 2 times daily before meals, Take as directed per insulin instructions.    insulin regular (NovoLIN R Regular U100 Insulin) 100 unit/mL injection injectable 3 times a day (before meals)Sliding scale coverage    isosorbide mononitrate ER (IMDUR) 30 mg, oral, Daily    losartan (COZAAR) 50 mg, oral, Daily    magnesium glycinate 100 mg magnesium capsule 255 capsules, oral, Daily    metroNIDAZOLE (FLAGYL) 500 mg, oral, 3 " times daily    multivitamin tablet oral    OneTouch Verio test strips strip use to take blood sugars at least 3-4 times per day    Ozempic 0.25 mg or 0.5 mg (2 mg/3 mL) pen injector     Stiolto Respimat 2.5-2.5 mcg/actuation mist inhaler 2 puffs inhaled once a day    vitamin B complex tablet 1 tablet, oral, Daily       Physical Exam:  Constitutional:       Appearance: Patient appeared in no acute cardiopulmonary distress.     Comments: Patient alert and oriented to person place time and situation.  HEENT:      Head: Normocephalic and atraumatic.Trachea midline      Nose:No observed congestion or rhinorrhea.     Mouth/Throat: Mucous membranes Moist, Trachea appeared  midline.  Eyes:      Extraocular Movements: Extraocular movements intact.      Pupils: Pupils are equal, round, and reactive to light.      Comments: No scleral icterus or conjunctival injection appreciated.   Cardiovascular:      Rate and Rhythm: Normal rate and regular rhythm. No clicks rubs or gallops, normal S1 and S2.No peripheral stigmata of endocarditis appreciated.     Pulmonary:      Bilateral wheezing  Abdominal:      General: Status post surgical intervention, abdomen nondistended, hypoactive bowel sounds, no involuntary guarding or rebound tenderness appreciated.     Comments: None   Musculoskeletal:       Patient appeared to have full active range of motion for upper and lower extremities, no acute apparent joint deformity appreciated on examination.   No pitting edema or cyanosis appreciated.       Lymphadenopathy:      No appreciable palpable lymphadenopathy  Skin:     General: Skin is warm.      Coloration:  No jaundice     Findings: No Pandey Banks's or [place] signs appreciated.  No appreciation of erythema or signs of infectious process related to surgical site/Laparoscopic incisions,dressing removed today..  Neurological:      General: No focal sensory or motor deficits appreciated, no meningeal signs or dysmetria noted.      Cranial  Nerves: Cranial nerves II to XII appearing grossly intact.     Genitals:  Deferred  Psychiatric:         The patient appears to be displaying normal mood and affect at the time of evaluation.     Assessment/Plan   1) New onset afib  Probably has underlying SSS  Needs anticoagulation when ok with Surgery  Check echo  Hold off on beta blockers as he has intermittent bradycardic episodes  Long term needs eliquis - Can discontinue Plavix when starting eliquis (Was for stroke prophylaxis)    2) SOB  Check CXR  Breathing treatment as per Primary team  Peripheral IV 07/11/24 22 G Right;Posterior Hand (Active)   Site Assessment Clean;Dry;Intact 07/11/24 0818   Dressing Status Clean;Dry 07/11/24 0818   Number of days: 0       Code Status:  Full Code    I spent 30 minutes in the professional and overall care of this patient.        Diogo Murdock MD

## 2024-07-11 NOTE — PROGRESS NOTES
[facility]  GENERAL SURGERY - PROGRESS NOTE    Patient Name: Jose Olivia  MRN: [id number]  Admit Date: 707  : 1955  AGE: 68 y.o.   GENDER: male    CHIEF COMPLAINT / EVENTS LAST 24HRS / HPI:  Pain worse this morning right lower quadrant,    MEDICAL HISTORY / ROS:   Admission history and ROS reviewed. Pertinent changes as follows:      Review of Systems   Constitutional:   Negative for fever, chills, weight loss.   HENT:  Negative for congestion and dental problem.    Eyes:  Negative for discharge and itching.   Respiratory: . Negative for apnea, choking and wheezing.    Cardiovascular:  Negative for palpitations and leg swelling.   Gastrointestinal:  negative for n/v  Endocrine: Negative for cold intolerance and heat intolerance.   Musculoskeletal:  Negative for neck pain.   Skin:  Negative for color change.   Neurological:  Negative for tingling, loss of consciousness and numbness.   Psychiatric/Behavioral:  Negative for agitation and behavioral problems.      PHYSICAL EXAM:  Heart Rate:  [71-83]   Temp:  [36.4 °C (97.5 °F)-37.7 °C (99.9 °F)]   Resp:  [17-20]   BP: (114-177)/(71-87)   Weight:  [94.4 kg (208 lb 1.8 oz)]   SpO2:  [94 %-96 %]   Physical Exam  Constitutional:       Appearance: Normal appearance.   HENT:      Head: Normocephalic.      Mouth/Throat:      Mouth: Mucous membranes are moist.   Eyes:      Pupils: Pupils are equal, round, and reactive to light.   Cardiovascular:      Rate and Rhythm: Normal rate.   Abdominal:      General: Abdomen is flat.      Comments: Tender to palpation of right lower quadrant no rebound or guarding   Musculoskeletal:         General: Normal range of motion.   Skin:     General: Skin is warm.      Capillary Refill: Capillary refill takes less than 2 seconds.   Neurological:      Mental Status: He is alert.   Psychiatric:         Mood and Affect: Mood normal.             I have reviewed all medications, laboratory results, and  imaging pertinent for today's encounter.    ==============================================================================  TODAY'S ASSESSMENT AND PLAN OF CARE:  Patient is a 68-year-old male who presented with acute appendicitis now status post laparoscopic appendectomy found to have perforated appendicitis IntraOp.  Patient had increased leukocytosis this morning 12.5 from 12.3 and pain is still persistent in right lower quadrant will obtain CT scan to evaluate for abscess.    Plan:  -CT abdomen/pelvis to evaluate for abscess  -IV abx per ID  -OOB  -IS 6-10 times per hour      Patient will be discussed with Attending Physician Dr. Jazlyn Chan PGY4  General Surgery Service   ==============================================================================

## 2024-07-11 NOTE — PROGRESS NOTES
[facility] INFECTIOUS DISEASE PROGRESS NOTE    Patient Name: Jose Olivia  MRN: [id number]    INTERVAL HISTORY:   No fevers. WBC are better. Complains of abx pain wendy w coughing    Patient Active Problem List   Diagnosis    Abnormal EKG    Abnormal stress test    Acute carpal tunnel syndrome, right    Acute sinusitis    Allergic rhinitis    Benign essential hypertension    Bilateral tinnitus    CAD (coronary artery disease)    Cervical vertigo    Chronic cough    Chronic glossitis    Claudication, intermittent (CMS-HCC)    Contact with and (suspected) exposure to covid-19    COPD, mild (Multi)    Mild chronic obstructive pulmonary disease (Multi)    Diabetes mellitus (Multi)    Diabetic peripheral neuropathy (Multi)    Disease suspected    Dizziness    Dry skin    Elevated serum creatinine    Fatigue    Hemiplegia (Multi)    History of elevated lipids    History of hypertension    HLD (hyperlipidemia)    Hyperkalemia    Hypogonadism in male    Imbalance    Insomnia    Left hemiparesis (Multi)    Low testosterone in male    Mild renal insufficiency    Nasal valve blockage    Numbness    Obstructive sleep apnea syndrome    Onychomycosis    Spondylosis of cervical spine    Osteoarthritis of spine with radiculopathy, cervical region    Left knee pain    Pain and swelling of lower leg, left    Pain of foot    Right hand paresthesia    Right hand weakness    Shortness of breath    Stroke (Multi)    Tinea corporis    Ulnar neuropathy at elbow, right    Acute appendicitis with localized peritonitis, without perforation, abscess, or gangrene        ASSESSMENT:   Perf Appendicitis  Localized peritonitis  HELLEN on CKD 3  DM2   CAD  COPD  HTN     Plan   Continue Ceftriaxone and Flagyl  Follow cultures  Check Bcx x 2 w fevers   Serial abd exams    If advances with diet then discharge plan will be on oral Omnicef and Flagyl to complete 10 days of therapy postoperatively.  Repeat imaging may be warranted for worsening symptoms,  leukocytosis or adverse abdominal examination    MEDICATIONS: reviewed.    Current Facility-Administered Medications:     acetaminophen (Tylenol) tablet 650 mg, 650 mg, oral, q4h PRN, Woody Chan MD, 650 mg at 07/10/24 1209    aspirin chewable tablet 81 mg, 81 mg, oral, Daily, Gilles Hobson MD, 81 mg at 07/11/24 0810    cefTRIAXone (Rocephin) 2 g in dextrose 5% in water (D5W) 50 mL, 2 g, intravenous, q24h, Hamlet Wolff MD, Stopped at 07/11/24 0639    clopidogrel (Plavix) tablet 75 mg, 75 mg, oral, Daily, Jori Blanco MD, 75 mg at 07/11/24 0810    dextrose 50 % injection 12.5 g, 12.5 g, intravenous, q15 min PRN, Gilles Hobson MD    dextrose 50 % injection 25 g, 25 g, intravenous, q15 min PRN, Gilles Hobson MD    ezetimibe (Zetia) tablet 10 mg, 10 mg, oral, Daily, Gilles Hobson MD, 10 mg at 07/11/24 0810    glucagon (Glucagen) injection 1 mg, 1 mg, intramuscular, q15 min PRN, Gilles Hobson MD    glucagon (Glucagen) injection 1 mg, 1 mg, intramuscular, q15 min PRN, Gilles Hobson MD    HYDROmorphone PF (Dilaudid) injection 0.2 mg, 0.2 mg, intravenous, q3h PRN, Riley Reyes DO, 0.2 mg at 07/11/24 0811    insulin lispro (HumaLOG) injection 0-10 Units, 0-10 Units, subcutaneous, TID, Gilles Hobson MD, 2 Units at 07/11/24 0812    iohexol (OMNIPaque) 12 mg iodine/mL oral contrast 500 mL, 500 mL, oral, Once in imaging, Woody Chan MD    isosorbide mononitrate ER (Imdur) 24 hr tablet 30 mg, 30 mg, oral, Daily, Gilles Hobson MD, 30 mg at 07/11/24 0810    losartan (Cozaar) tablet 50 mg, 50 mg, oral, Daily, Gilles Hobson MD, 50 mg at 07/10/24 2142    metroNIDAZOLE (Flagyl) 500 mg in NaCl (iso-os) 100 mL, 500 mg, intravenous, q8h, Hamlet Wolff MD, Stopped at 07/11/24 0743    ondansetron (Zofran) injection 4 mg, 4 mg, intravenous, q6h PRN, Gilles Hobson MD, 4 mg at 07/11/24 0646    oxyCODONE (Roxicodone) immediate release tablet 10 mg, 10 mg, oral, q4h PRN, Woody Chan MD, 10 mg at 07/10/24 2143    oxyCODONE (Roxicodone)  immediate release tablet 5 mg, 5 mg, oral, q4h PRN, Wooyd Chan MD    polyethylene glycol (Glycolax, Miralax) packet 17 g, 17 g, oral, Daily PRN, Jaspal Condon DO, 17 g at 07/10/24 0949    sodium chloride 0.9% infusion, 75 mL/hr, intravenous, Continuous, Jaspal Condon DO, Last Rate: 75 mL/hr at 24 0454, 75 mL/hr at 24 0454     PHYSICAL EXAM:  Vital signs: BP (!) 178/95 (BP Location: Left arm, Patient Position: Sitting)   Pulse 74   Temp 36.3 °C (97.4 °F) (Temporal)   Resp 18   Ht 1.829 m (6')   Wt 94.4 kg (208 lb 1.8 oz)   SpO2 97%   BMI 28.23 kg/m²   Temp (24hrs), Av.7 °C (98.1 °F), Min:36.3 °C (97.4 °F), Max:37.1 °C (98.8 °F)    General: alert, oriented, NAD  Lungs: bilaterally clear to auscultation  Heart: regular rate and rhythm  Abdomen: soft, + tender, non distended, BS+  Extremities: no edema  No rashes  No joint inflammation  Neck supple  Lines ok  No CVAT    Labs:    Results for orders placed or performed during the hospital encounter of 24 (from the past 96 hour(s))   POCT GLUCOSE   Result Value Ref Range    POCT Glucose 207 (H) 74 - 99 mg/dL   Tissue/Wound Culture/Smear    Specimen: Other (specify in comments); Tissue/Biopsy   Result Value Ref Range    Tissue/Wound Culture/Smear (4+) Abundant Mixed Gram-Negative Bacteria     Tissue/Wound Culture/Smear (4+) Abundant Mixed Anaerobic Bacteria     Beta Lactamase (Cefinase) Positive     Gram Stain (4+) Abundant Polymorphonuclear leukocytes (A)     Gram Stain (A)      (3+) Moderate Mixed Gram positive and Gram negative bacteria   POCT GLUCOSE   Result Value Ref Range    POCT Glucose 156 (H) 74 - 99 mg/dL   POCT GLUCOSE   Result Value Ref Range    POCT Glucose 177 (H) 74 - 99 mg/dL   POCT GLUCOSE   Result Value Ref Range    POCT Glucose 196 (H) 74 - 99 mg/dL   CBC and Auto Differential   Result Value Ref Range    WBC 16.6 (H) 4.4 - 11.3 x10*3/uL    nRBC 0.0 0.0 - 0.0 /100 WBCs    RBC 4.55 4.50 - 5.90 x10*6/uL     Hemoglobin 15.2 13.5 - 17.5 g/dL    Hematocrit 44.4 41.0 - 52.0 %    MCV 98 80 - 100 fL    MCH 33.4 26.0 - 34.0 pg    MCHC 34.2 32.0 - 36.0 g/dL    RDW 13.1 11.5 - 14.5 %    Platelets 217 150 - 450 x10*3/uL    Neutrophils % 91.0 40.0 - 80.0 %    Immature Granulocytes %, Automated 0.5 0.0 - 0.9 %    Lymphocytes % 3.8 13.0 - 44.0 %    Monocytes % 4.5 2.0 - 10.0 %    Eosinophils % 0.0 0.0 - 6.0 %    Basophils % 0.2 0.0 - 2.0 %    Neutrophils Absolute 15.06 (H) 1.20 - 7.70 x10*3/uL    Immature Granulocytes Absolute, Automated 0.09 0.00 - 0.70 x10*3/uL    Lymphocytes Absolute 0.63 (L) 1.20 - 4.80 x10*3/uL    Monocytes Absolute 0.75 0.10 - 1.00 x10*3/uL    Eosinophils Absolute 0.00 0.00 - 0.70 x10*3/uL    Basophils Absolute 0.03 0.00 - 0.10 x10*3/uL   Basic Metabolic Panel   Result Value Ref Range    Glucose 158 (H) 74 - 99 mg/dL    Sodium 136 136 - 145 mmol/L    Potassium 4.6 3.5 - 5.3 mmol/L    Chloride 105 98 - 107 mmol/L    Bicarbonate 22 21 - 32 mmol/L    Anion Gap 14 10 - 20 mmol/L    Urea Nitrogen 32 (H) 6 - 23 mg/dL    Creatinine 1.64 (H) 0.50 - 1.30 mg/dL    eGFR 45 (L) >60 mL/min/1.73m*2    Calcium 8.9 8.6 - 10.3 mg/dL   POCT GLUCOSE   Result Value Ref Range    POCT Glucose 132 (H) 74 - 99 mg/dL   POCT GLUCOSE   Result Value Ref Range    POCT Glucose 257 (H) 74 - 99 mg/dL   POCT GLUCOSE   Result Value Ref Range    POCT Glucose 166 (H) 74 - 99 mg/dL   POCT GLUCOSE   Result Value Ref Range    POCT Glucose 82 74 - 99 mg/dL   CBC   Result Value Ref Range    WBC 12.0 (H) 4.4 - 11.3 x10*3/uL    nRBC 0.0 0.0 - 0.0 /100 WBCs    RBC 4.16 (L) 4.50 - 5.90 x10*6/uL    Hemoglobin 13.6 13.5 - 17.5 g/dL    Hematocrit 40.9 (L) 41.0 - 52.0 %    MCV 98 80 - 100 fL    MCH 32.7 26.0 - 34.0 pg    MCHC 33.3 32.0 - 36.0 g/dL    RDW 13.0 11.5 - 14.5 %    Platelets 182 150 - 450 x10*3/uL   Basic Metabolic Panel   Result Value Ref Range    Glucose 105 (H) 74 - 99 mg/dL    Sodium 136 136 - 145 mmol/L    Potassium 4.1 3.5 - 5.3 mmol/L     Chloride 105 98 - 107 mmol/L    Bicarbonate 26 21 - 32 mmol/L    Anion Gap 9 (L) 10 - 20 mmol/L    Urea Nitrogen 29 (H) 6 - 23 mg/dL    Creatinine 1.53 (H) 0.50 - 1.30 mg/dL    eGFR 49 (L) >60 mL/min/1.73m*2    Calcium 8.2 (L) 8.6 - 10.3 mg/dL   POCT GLUCOSE   Result Value Ref Range    POCT Glucose 107 (H) 74 - 99 mg/dL   POCT GLUCOSE   Result Value Ref Range    POCT Glucose 98 74 - 99 mg/dL   POCT GLUCOSE   Result Value Ref Range    POCT Glucose 166 (H) 74 - 99 mg/dL   POCT GLUCOSE   Result Value Ref Range    POCT Glucose 147 (H) 74 - 99 mg/dL   Basic Metabolic Panel   Result Value Ref Range    Glucose 156 (H) 74 - 99 mg/dL    Sodium 133 (L) 136 - 145 mmol/L    Potassium 3.9 3.5 - 5.3 mmol/L    Chloride 102 98 - 107 mmol/L    Bicarbonate 21 21 - 32 mmol/L    Anion Gap 14 10 - 20 mmol/L    Urea Nitrogen 25 (H) 6 - 23 mg/dL    Creatinine 1.32 (H) 0.50 - 1.30 mg/dL    eGFR 58 (L) >60 mL/min/1.73m*2    Calcium 8.5 (L) 8.6 - 10.3 mg/dL   CBC   Result Value Ref Range    WBC 12.3 (H) 4.4 - 11.3 x10*3/uL    nRBC 0.0 0.0 - 0.0 /100 WBCs    RBC 4.38 (L) 4.50 - 5.90 x10*6/uL    Hemoglobin 14.6 13.5 - 17.5 g/dL    Hematocrit 42.4 41.0 - 52.0 %    MCV 97 80 - 100 fL    MCH 33.3 26.0 - 34.0 pg    MCHC 34.4 32.0 - 36.0 g/dL    RDW 12.6 11.5 - 14.5 %    Platelets 186 150 - 450 x10*3/uL   Magnesium   Result Value Ref Range    Magnesium 1.72 1.60 - 2.40 mg/dL   POCT GLUCOSE   Result Value Ref Range    POCT Glucose 181 (H) 74 - 99 mg/dL   POCT GLUCOSE   Result Value Ref Range    POCT Glucose 233 (H) 74 - 99 mg/dL   POCT GLUCOSE   Result Value Ref Range    POCT Glucose 142 (H) 74 - 99 mg/dL   POCT GLUCOSE   Result Value Ref Range    POCT Glucose 242 (H) 74 - 99 mg/dL   Basic Metabolic Panel   Result Value Ref Range    Glucose 179 (H) 74 - 99 mg/dL    Sodium 135 (L) 136 - 145 mmol/L    Potassium 4.4 3.5 - 5.3 mmol/L    Chloride 110 (H) 98 - 107 mmol/L    Bicarbonate 14 (L) 21 - 32 mmol/L    Anion Gap 15 10 - 20 mmol/L    Urea Nitrogen  23 6 - 23 mg/dL    Creatinine 0.96 0.50 - 1.30 mg/dL    eGFR 86 >60 mL/min/1.73m*2    Calcium 7.2 (L) 8.6 - 10.3 mg/dL   CBC   Result Value Ref Range    WBC 12.5 (H) 4.4 - 11.3 x10*3/uL    nRBC 0.0 0.0 - 0.0 /100 WBCs    RBC 4.57 4.50 - 5.90 x10*6/uL    Hemoglobin 15.2 13.5 - 17.5 g/dL    Hematocrit 43.7 41.0 - 52.0 %    MCV 96 80 - 100 fL    MCH 33.3 26.0 - 34.0 pg    MCHC 34.8 32.0 - 36.0 g/dL    RDW 12.5 11.5 - 14.5 %    Platelets 206 150 - 450 x10*3/uL        Microbiology data: reviewed    Imaging data: reviewed      Hamlet Wolff MD  [phone]  7/11/2024  8:19 AM

## 2024-07-11 NOTE — NURSING NOTE
"Report received from Bren OSORIO    Patient had converted to afib overnight, EKG was obtained confirming and IMS notified. Cardiology consulted. Patient also was in pain throughout the night with nausea. They added dilaudid for breakthrough pain and gave zofran as well    0800:  Cardio in to see patient     New orders:  Complete echo  Chest xray  Metop 25mg BID  Breathing treatments     Other new orders from IMS include:  Lacatate draw  Stat CT of Abd/p    0930:  Patient off floor for chest xray/ CT abd/p     1130  Patient back to floor  Called by physician to bladder scan patient after post void to check residual  Lactate redraw 0.8  Consult placed to interventional radiology for \"intraabdominal abscess\"    1150:  Post void residual check >1000 IMS notified   Order to place magallon   18 fr coude magallon placed -1650cc teo urine out from bladder    1245:  Patient off the floor for echo  Also did IR abscess drainage while downstairs. Removed 7mL fluids and sent for testing, no drain placed.    1500:   Back on floor, reports feeling much better. Abd palpation much less tender. Also able to have another medium size BM     1645:  New order to start heparin drip  PT/INR sent   Another IV 20g placed in left FA  Miralax given,     New orders:   colace, miralax routine, and Senakot  TSH lab draw    1740:  Heparin drip started at 17 ml/hr   Next assay will be at 2140       "

## 2024-07-12 ENCOUNTER — TELEPHONE (OUTPATIENT)
Dept: CARDIOLOGY | Facility: CLINIC | Age: 69
End: 2024-07-12
Payer: MEDICARE

## 2024-07-12 LAB
ANION GAP SERPL CALC-SCNC: 14 MMOL/L (ref 10–20)
BACTERIA UR CULT: NO GROWTH
BUN SERPL-MCNC: 21 MG/DL (ref 6–23)
CALCIUM SERPL-MCNC: 8.5 MG/DL (ref 8.6–10.3)
CHLORIDE SERPL-SCNC: 105 MMOL/L (ref 98–107)
CO2 SERPL-SCNC: 20 MMOL/L (ref 21–32)
CREAT SERPL-MCNC: 1.05 MG/DL (ref 0.5–1.3)
EGFRCR SERPLBLD CKD-EPI 2021: 77 ML/MIN/1.73M*2
ERYTHROCYTE [DISTWIDTH] IN BLOOD BY AUTOMATED COUNT: 12.5 % (ref 11.5–14.5)
GLUCOSE BLD MANUAL STRIP-MCNC: 193 MG/DL (ref 74–99)
GLUCOSE BLD MANUAL STRIP-MCNC: 211 MG/DL (ref 74–99)
GLUCOSE BLD MANUAL STRIP-MCNC: 292 MG/DL (ref 74–99)
GLUCOSE BLD MANUAL STRIP-MCNC: 309 MG/DL (ref 74–99)
GLUCOSE SERPL-MCNC: 188 MG/DL (ref 74–99)
HCT VFR BLD AUTO: 42.3 % (ref 41–52)
HGB BLD-MCNC: 14.9 G/DL (ref 13.5–17.5)
MCH RBC QN AUTO: 33.3 PG (ref 26–34)
MCHC RBC AUTO-ENTMCNC: 35.2 G/DL (ref 32–36)
MCV RBC AUTO: 94 FL (ref 80–100)
NRBC BLD-RTO: 0 /100 WBCS (ref 0–0)
PLATELET # BLD AUTO: 229 X10*3/UL (ref 150–450)
POTASSIUM SERPL-SCNC: 3.8 MMOL/L (ref 3.5–5.3)
RBC # BLD AUTO: 4.48 X10*6/UL (ref 4.5–5.9)
SODIUM SERPL-SCNC: 135 MMOL/L (ref 136–145)
UFH PPP CHRO-ACNC: 0.3 IU/ML
UFH PPP CHRO-ACNC: 0.3 IU/ML
WBC # BLD AUTO: 11.8 X10*3/UL (ref 4.4–11.3)

## 2024-07-12 PROCEDURE — 2500000004 HC RX 250 GENERAL PHARMACY W/ HCPCS (ALT 636 FOR OP/ED): Performed by: HOSPITALIST

## 2024-07-12 PROCEDURE — 85027 COMPLETE CBC AUTOMATED: CPT | Performed by: HOSPITALIST

## 2024-07-12 PROCEDURE — 2500000001 HC RX 250 WO HCPCS SELF ADMINISTERED DRUGS (ALT 637 FOR MEDICARE OP): Performed by: INTERNAL MEDICINE

## 2024-07-12 PROCEDURE — 2500000002 HC RX 250 W HCPCS SELF ADMINISTERED DRUGS (ALT 637 FOR MEDICARE OP, ALT 636 FOR OP/ED): Performed by: SURGERY

## 2024-07-12 PROCEDURE — 2500000001 HC RX 250 WO HCPCS SELF ADMINISTERED DRUGS (ALT 637 FOR MEDICARE OP): Performed by: HOSPITALIST

## 2024-07-12 PROCEDURE — 2500000004 HC RX 250 GENERAL PHARMACY W/ HCPCS (ALT 636 FOR OP/ED): Mod: JZ | Performed by: INTERNAL MEDICINE

## 2024-07-12 PROCEDURE — 85520 HEPARIN ASSAY: CPT | Performed by: HOSPITALIST

## 2024-07-12 PROCEDURE — 36415 COLL VENOUS BLD VENIPUNCTURE: CPT | Performed by: HOSPITALIST

## 2024-07-12 PROCEDURE — 82947 ASSAY GLUCOSE BLOOD QUANT: CPT

## 2024-07-12 PROCEDURE — 2500000001 HC RX 250 WO HCPCS SELF ADMINISTERED DRUGS (ALT 637 FOR MEDICARE OP): Performed by: NURSE PRACTITIONER

## 2024-07-12 PROCEDURE — 2500000001 HC RX 250 WO HCPCS SELF ADMINISTERED DRUGS (ALT 637 FOR MEDICARE OP): Performed by: SURGERY

## 2024-07-12 PROCEDURE — 2060000001 HC INTERMEDIATE ICU ROOM DAILY

## 2024-07-12 PROCEDURE — 80048 BASIC METABOLIC PNL TOTAL CA: CPT | Performed by: HOSPITALIST

## 2024-07-12 PROCEDURE — 99233 SBSQ HOSP IP/OBS HIGH 50: CPT | Performed by: HOSPITALIST

## 2024-07-12 PROCEDURE — 99232 SBSQ HOSP IP/OBS MODERATE 35: CPT | Performed by: NURSE PRACTITIONER

## 2024-07-12 RX ORDER — LOSARTAN POTASSIUM 50 MG/1
100 TABLET ORAL DAILY
Status: DISCONTINUED | OUTPATIENT
Start: 2024-07-12 | End: 2024-07-15 | Stop reason: HOSPADM

## 2024-07-12 RX ADMIN — APIXABAN 5 MG: 5 TABLET, FILM COATED ORAL at 10:03

## 2024-07-12 RX ADMIN — ASPIRIN 81 MG CHEWABLE TABLET 81 MG: 81 TABLET CHEWABLE at 08:00

## 2024-07-12 RX ADMIN — INSULIN LISPRO 8 UNITS: 100 INJECTION, SOLUTION INTRAVENOUS; SUBCUTANEOUS at 16:08

## 2024-07-12 RX ADMIN — EZETIMIBE 10 MG: 10 TABLET ORAL at 08:00

## 2024-07-12 RX ADMIN — METRONIDAZOLE 500 MG: 500 INJECTION, SOLUTION INTRAVENOUS at 14:11

## 2024-07-12 RX ADMIN — METRONIDAZOLE 500 MG: 500 INJECTION, SOLUTION INTRAVENOUS at 22:11

## 2024-07-12 RX ADMIN — APIXABAN 5 MG: 5 TABLET, FILM COATED ORAL at 20:40

## 2024-07-12 RX ADMIN — HEPARIN SODIUM 1700 UNITS/HR: 10000 INJECTION, SOLUTION INTRAVENOUS at 02:51

## 2024-07-12 RX ADMIN — DOCUSATE SODIUM 100 MG: 100 CAPSULE, LIQUID FILLED ORAL at 20:40

## 2024-07-12 RX ADMIN — METRONIDAZOLE 500 MG: 500 INJECTION, SOLUTION INTRAVENOUS at 08:00

## 2024-07-12 RX ADMIN — LOSARTAN POTASSIUM 100 MG: 50 TABLET, FILM COATED ORAL at 20:40

## 2024-07-12 RX ADMIN — CLOPIDOGREL 75 MG: 75 TABLET ORAL at 08:00

## 2024-07-12 RX ADMIN — ISOSORBIDE MONONITRATE 30 MG: 30 TABLET, EXTENDED RELEASE ORAL at 08:00

## 2024-07-12 RX ADMIN — CEFTRIAXONE SODIUM 2 G: 2 INJECTION, SOLUTION INTRAVENOUS at 06:22

## 2024-07-12 RX ADMIN — SENNOSIDES 8.6 MG: 8.6 TABLET, FILM COATED ORAL at 20:40

## 2024-07-12 RX ADMIN — DOCUSATE SODIUM 100 MG: 100 CAPSULE, LIQUID FILLED ORAL at 08:00

## 2024-07-12 ASSESSMENT — PAIN - FUNCTIONAL ASSESSMENT
PAIN_FUNCTIONAL_ASSESSMENT: 0-10

## 2024-07-12 ASSESSMENT — ENCOUNTER SYMPTOMS
SHORTNESS OF BREATH: 0
RESPIRATORY NEGATIVE: 1
MEMORY LOSS: 0
DYSPNEA ON EXERTION: 0
DEPRESSION: 0
ABDOMINAL PAIN: 1
ORTHOPNEA: 0
DECREASED APPETITE: 0
LIGHT-HEADEDNESS: 0
BLOATING: 1
IRREGULAR HEARTBEAT: 0
BLURRED VISION: 0
COUGH: 0
FOCAL WEAKNESS: 0
SYNCOPE: 0
FALLS: 0
CONSTITUTIONAL NEGATIVE: 1
PALPITATIONS: 0

## 2024-07-12 ASSESSMENT — COGNITIVE AND FUNCTIONAL STATUS - GENERAL
DAILY ACTIVITIY SCORE: 24
MOBILITY SCORE: 22
WALKING IN HOSPITAL ROOM: A LITTLE
CLIMB 3 TO 5 STEPS WITH RAILING: A LITTLE

## 2024-07-12 ASSESSMENT — PAIN SCALES - GENERAL
PAINLEVEL_OUTOF10: 0 - NO PAIN

## 2024-07-12 NOTE — PROGRESS NOTES
HPI:  Jose Olivia is a 68 y.o. male with past medical history of HTN, CAD, mild COPD, DM with diabetic peripheral neuropathy, hyperlipidemia, mild renal insufficiency and CVA 2018 (on aspirin and Plavix since that time) comes to the hospital with abdominal pain. The patient states that his pain began yesterday morning while he was working on his water heater. According the patient the pain moved from the middle of the abdomen more to the right. He reported some nausea and vomiting. He also reported some diarrhea. He reported some chills and sweats but denies any chest pain or shortness of breath. Initially the patient wanted to leave AMA to finish his water heater work but then later decided to stay for surgical intervention.   He is s/p appendectomy. He was noted to be in atrial fib on monitor. On presentation he had bradycardia     Subjective Data:  Did well overnight. Has been up in room with assist and denies any CP/dyspnea/lightheadedness.  Patient is currently in Afib, HR in the 70's.  On Heparin gtt.  I messaged surgery and they are OK with oral anticoagulation.  Creatinine 1.05, K 3.8.  Echow with EF 60-65%    Overnight Events:    None     Objective Data:  Last Recorded Vitals:  Vitals:    07/11/24 2127 07/11/24 2338 07/12/24 0332 07/12/24 0752   BP: 174/70 (!) 154/97 161/90 (!) 169/96   BP Location: Left arm Left arm Left arm Left arm   Patient Position: Lying Lying Lying Sitting   Pulse: 78 82 76 90   Resp: 20 18 20 20   Temp: 37.2 °C (99 °F) 36.4 °C (97.6 °F) 36.1 °C (97 °F) 36.7 °C (98.1 °F)   TempSrc: Temporal Temporal Temporal Temporal   SpO2: 97% 97% 96% 96%   Weight:   92.1 kg (203 lb 0.7 oz)    Height:           Last Labs:    Results from last 7 days   Lab Units 07/12/24  0442 07/11/24  0455 07/10/24  0437   SODIUM mmol/L 135* 135* 133*   POTASSIUM mmol/L 3.8 4.4 3.9   CHLORIDE mmol/L 105 110* 102   CO2 mmol/L 20* 14* 21   BUN mg/dL 21 23 25*   CREATININE mg/dL 1.05 0.96 1.32*   GLUCOSE mg/dL 188*  179* 156*   CALCIUM mg/dL 8.5* 7.2* 8.5*        Results from last 7 days   Lab Units 07/12/24  0442 07/11/24  0455 07/10/24  0437   WBC AUTO x10*3/uL 11.8* 12.5* 12.3*   HEMOGLOBIN g/dL 14.9 15.2 14.6   HEMATOCRIT % 42.3 43.7 42.4   PLATELETS AUTO x10*3/uL 229 206 186               Last I/O:  I/O last 3 completed shifts:  In: 1064.2 (11.6 mL/kg) [P.O.:218; I.V.:546.2 (5.9 mL/kg); IV Piggyback:300]  Out: 2750 (29.9 mL/kg) [Urine:2750 (0.8 mL/kg/hr)]  Weight: 92.1 kg     Past Cardiology Tests (Last 3 Years):    Echo:  CONCLUSIONS:   1. The left ventricular systolic function is normal, with a visually estimated ejection fraction of 60-65%.   2. There is normal right ventricular global systolic function.    CXR:  IMPRESSION:  No acute radiographic abnormality        Inpatient Medications:  Scheduled medications   Medication Dose Route Frequency    aspirin  81 mg oral Daily    cefTRIAXone  2 g intravenous q24h    clopidogrel  75 mg oral Daily    docusate sodium  100 mg oral BID    ezetimibe  10 mg oral Daily    insulin lispro  0-10 Units subcutaneous TID    isosorbide mononitrate ER  30 mg oral Daily    losartan  50 mg oral Daily    metroNIDAZOLE  500 mg intravenous q8h    perflutren lipid microspheres  0.5-10 mL of dilution intravenous Once in imaging    perflutren lipid microspheres  0.5-10 mL of dilution intravenous Once in imaging    perflutren protein A microsphere  0.5 mL intravenous Once in imaging    polyethylene glycol  17 g oral Daily    sennosides  1 tablet oral Nightly    sulfur hexafluoride microsphr  2 mL intravenous Once in imaging    sulfur hexafluoride microsphr  2 mL intravenous Once in imaging     PRN medications   Medication    acetaminophen    albuterol    dextrose    dextrose    glucagon    glucagon    HYDROmorphone    ondansetron    oxyCODONE    oxyCODONE     Continuous Medications   Medication Dose Last Rate    heparin  0-4,500 Units/hr 1,700 Units/hr (07/12/24 0300)       ROS:  Review of Systems    Constitutional: Negative. Negative for decreased appetite and malaise/fatigue.   HENT: Negative.     Eyes:  Negative for blurred vision and visual disturbance.   Cardiovascular:  Negative for chest pain, dyspnea on exertion, irregular heartbeat, leg swelling, orthopnea, palpitations and syncope.   Respiratory: Negative.  Negative for cough and shortness of breath.    Musculoskeletal:  Negative for arthritis and falls.   Gastrointestinal:  Positive for bloating and abdominal pain (incisional discomfort).   Neurological:  Negative for focal weakness and light-headedness.   Psychiatric/Behavioral:  Negative for depression and memory loss.         Physical Exam:  Physical Exam  Constitutional:       Appearance: Normal appearance.   HENT:      Head: Normocephalic.   Eyes:      Conjunctiva/sclera: Conjunctivae normal.   Cardiovascular:      Rate and Rhythm: Normal rate. Rhythm irregular.      Pulses: Normal pulses.      Heart sounds: S1 normal and S2 normal. No murmur heard.     No friction rub. No gallop.   Pulmonary:      Effort: Pulmonary effort is normal.      Breath sounds: Normal breath sounds.   Abdominal:      Comments: Abdomen softly rounded, + tenderness, midline dressing intact.  Hyperactive BS noted.    Musculoskeletal:      Cervical back: Neck supple.      Right lower leg: No edema.      Left lower leg: No edema.   Skin:     General: Skin is warm and dry.   Neurological:      General: No focal deficit present.      Mental Status: He is alert and oriented to person, place, and time.   Psychiatric:         Attention and Perception: Attention normal.         Mood and Affect: Mood normal.          Assessment/Plan   1) New onset afib  Probably has underlying SSS  Needs anticoagulation when ok with Surgery  Check echo  Hold off on beta blockers as he has intermittent bradycardic episodes  Long term needs eliquis - Can discontinue Plavix when starting eliquis (Was for stroke prophylaxis)  7-12-24: Remains Afib  with rates in the 70s.  Patient seems to be asymptomatic.  Can consider DCC after 3-4 weeks of uninterrupted anticoagulation if he does not spontaneously convert to SR.  I d/w surgery and OK for oral anticoagulation.  Stop Heparin and start Eliquis.  As noted above, should no restart on Plavix on d/c.      2) SOB  Check CXR  Breathing treatment as per Primary team  7-12-24: Dyspnea has seemed to improve.  CXR normal.  IMS following. Does not appear to be volume overloaded.    3) HTN  On Losartan and Imdur. BP is elevated today, ? If driven by abdominal pain.  Will increase Losartan.  Can add Norvasc if BP is still suboptimal.     DISPOSITION:  Stop Heparin  Start Eliquis 5mg 2 times per day  Increase Losartan dose.  OK for d/c once OK with surgery  Will arrange outpt follow up with our office  Can consider DCC after 3-4 weeks of uninterrupted anticoagulation if he does not spontaneously convert to SR  Will sign off.        Code Status:  Full Code          Maribel Burgos, APRN-CNP

## 2024-07-12 NOTE — TELEPHONE ENCOUNTER
7/12/24  1320  Called patient; no answer. Left brief voice message informing of echocardiogram results and to call if questions.      ----- Message from Diogo Murdock sent at 7/12/2024  9:08 AM EDT -----  Echo normal

## 2024-07-12 NOTE — CARE PLAN
The clinical goals for the shift include patient will remain hemodynamically stable throughout the shift

## 2024-07-12 NOTE — PROGRESS NOTES
Discussed with Dr Condon in IDT rounds. Surgery is anticipating patient will be ready for discharge tomorrow. Final ID recommendations are still pending. TCC following for possible need for IV ATBX at discharge. Patient's plan remains to return home once medically cleared for discharge.

## 2024-07-12 NOTE — PROGRESS NOTES
[facility]  GENERAL SURGERY - PROGRESS NOTE    Patient Name: Jose Olivia  MRN: [id number]  Admit Date: 707  : 1955  AGE: 68 y.o.   GENDER: male    CHIEF COMPLAINT / EVENTS LAST 24HRS / HPI:  Pain improved to right lower quadrant.  Patient had aspiration yesterday with 10 mL of fluid aspirated    MEDICAL HISTORY / ROS:   Admission history and ROS reviewed. Pertinent changes as follows:      Review of Systems   Constitutional:   Negative for fever, chills, weight loss.   HENT:  Negative for congestion and dental problem.    Eyes:  Negative for discharge and itching.   Respiratory: . Negative for apnea, choking and wheezing.    Cardiovascular:  Negative for palpitations and leg swelling.   Gastrointestinal:  negative for n/v  Endocrine: Negative for cold intolerance and heat intolerance.   Musculoskeletal:  Negative for neck pain.   Skin:  Negative for color change.   Neurological:  Negative for tingling, loss of consciousness and numbness.   Psychiatric/Behavioral:  Negative for agitation and behavioral problems.      PHYSICAL EXAM:  Heart Rate:  [74-83]   Temp:  [36 °C (96.8 °F)-37.2 °C (99 °F)]   Resp:  [16-24]   BP: (154-178)/(70-97)   Weight:  [92.1 kg (203 lb 0.7 oz)]   SpO2:  [96 %-99 %]   Physical Exam  Constitutional:       Appearance: Normal appearance.   HENT:      Head: Normocephalic.      Mouth/Throat:      Mouth: Mucous membranes are moist.   Eyes:      Pupils: Pupils are equal, round, and reactive to light.   Cardiovascular:      Rate and Rhythm: Normal rate.   Abdominal:      General: Abdomen is flat.      Comments: Tender to palpation of right lower quadrant no rebound or guarding   Musculoskeletal:         General: Normal range of motion.   Skin:     General: Skin is warm.      Capillary Refill: Capillary refill takes less than 2 seconds.   Neurological:      Mental Status: He is alert.   Psychiatric:         Mood and Affect: Mood normal.             I have  reviewed all medications, laboratory results, and imaging pertinent for today's encounter.    ==============================================================================  TODAY'S ASSESSMENT AND PLAN OF CARE:  Patient is a 68-year-old male who presented with acute appendicitis now status post laparoscopic appendectomy found to have perforated appendicitis IntraOp.  Patient now status post needle aspiration on July 11.  Pain improved today leukocytosis down 11 from 12.  Patient  on exam      Plan:  -No acute surgical intervention  -Regular diet  -P.o. pain meds  -Will check labs tomorrow if continued to improve and patient's abdominal exam improves would consider discharging July 13  -Okay for heparin surgical standpoint    Patient will be discussed with Attending Physician Dr. Jazlyn Chan PGY4  General Surgery Service   ==============================================================================

## 2024-07-12 NOTE — PROGRESS NOTES
Jose Olivia [id number]   Service: Internal Medicine / Hospitalist Date of service: 7/11/2024                                   Full Code         Overnight Events: No new overnight events reported by patient or nursing.      Subjective        History Of Present Illness  Jose Olivia is a 68 y.o. male with past medical history of HTN, CAD, mild COPD, DM with diabetic peripheral neuropathy, hyperlipidemia, mild renal insufficiency and CVA 2018 (on aspirin and Plavix since that time) comes to the hospital with abdominal pain.  The patient states that his pain began yesterday morning while he was working on his water heater.  According the patient the pain moved from the middle of the abdomen more to the right.  He reported some nausea and vomiting.  He also reported some diarrhea.  He reported some chills and sweats but denies any chest pain or shortness of breath.  Initially the patient wanted to leave AMA to finish his water heater work but then later decided to stay for surgical intervention.  At this time the patient states his abdominal pain is improved and he is awaiting to go to the OR for surgery later today.         7/8.................No reported: Chest pains, dyspnea, orthopnea, palpitations, fevers or chills.Patient reported that overall he was doing well given recent surgery.  He endorsed some abdominal pain related to recent surgery.     7/9..............The patient reported feeling better today no reported : nausea, vomiting, chest pains or palpitations.The patient felt that his postop related abdominal pain was tolerable.        7/10................No reported: Palpitations, headaches, chest pains, fevers, chills, nausea or vomiting.  Patient endorsed constipation.     7/11.......................  No reported: Syncope, presyncope, chest pains, nausea, vomiting, palpitations or tremors.However patient did endorse wheezing and shortness of breath this morning.    7/12................  No reported:  Nausea, vomiting, headaches, chest pains, palpitations, fevers or chills.   Patient endorsed 2 bowel movements, no abdominal pain reported at time of evaluation.  Review of Systems:   Review of system otherwise negative if not aforementioned above in subjective.     Objective        Physical Exam      Constitutional:       Appearance: Patient appeared in no acute cardiopulmonary distress.     Comments: Patient alert and oriented to person place time and situation.  HEENT:      Head: Normocephalic and atraumatic.Trachea midline      Nose:No observed congestion or rhinorrhea.     Mouth/Throat: Mucous membranes Moist, Trachea appeared  midline.  Eyes:      Extraocular Movements: Extraocular movements intact.      Pupils: Pupils are equal, round, and reactive to light.      Comments: No scleral icterus or conjunctival injection appreciated.   Cardiovascular:      Rate and Rhythm: Irregular rate and rhythm.No peripheral stigmata of endocarditis appreciated.     Pulmonary:      Lungs appeared clear to auscultation, no adventitious sound appreciated.  Abdominal:      General: Status post surgical intervention, abdomen nondistended, hypoactive bowel sounds, no involuntary guarding or rebound tenderness appreciated.     Comments: Point tenderness on palpation.  Musculoskeletal:       Patient appeared to have full active range of motion for upper and lower extremities, no acute apparent joint deformity appreciated on examination.   No pitting edema or cyanosis appreciated.       Lymphadenopathy:      No appreciable palpable lymphadenopathy  Skin:     General: Skin is warm.      Coloration:  No jaundice     Findings: No Pandey Banks's or Hunter signs appreciated.  No appreciation of erythema or signs of infectious process related to surgical site/Laparoscopic incisions,dressing removed today..  Neurological:      General: No focal sensory or motor deficits appreciated, no meningeal signs or dysmetria noted.      Cranial Nerves:  Cranial nerves II to XII appearing grossly intact.     Genitals:  Deferred  Psychiatric:         The patient appears to be displaying normal mood and affect at the time of evaluation.     Labs:    Lab Results   Component Value Date    GLUCOSE 188 (H) 07/12/2024    CALCIUM 8.5 (L) 07/12/2024     (L) 07/12/2024    K 3.8 07/12/2024    CO2 20 (L) 07/12/2024     07/12/2024    BUN 21 07/12/2024    CREATININE 1.05 07/12/2024      Lab Results   Component Value Date    WBC 11.8 (H) 07/12/2024    HGB 14.9 07/12/2024    HCT 42.3 07/12/2024    MCV 94 07/12/2024     07/12/2024         [unfilled]   [unfilled]   No results found for the last 90 days.                  X-rays/ Images     [unfilled]   Radiology Results (last 21 days)     Procedure Component Value Units Date/Time   CT abdomen pelvis w IV contrast [[id number]] Collected: 07/07/24 0400   Order Status: Completed Updated: 07/07/24 0400   Narrative:     Interpreted By:  Corina Farmer,  STUDY:  CT ABDOMEN PELVIS W IV CONTRAST;  7/7/2024 3:45 am      INDICATION:  Signs/Symptoms:RLQ tenderness guarding.      COMPARISON:  CT scan of the abdomen pelvis 09/19/2011      ACCESSION NUMBER(S):  [id number]      ORDERING CLINICIAN:  LIVNA FLORES      TECHNIQUE:  Axial CT images of the abdomen and pelvis with coronal and sagittal  reconstructed images obtained after intravenous administration of 75  mL of Omnipaque 350      FINDINGS:  LOWER CHEST: Peripheral reticular opacities in the lung bases likely  relate to chronic fibrotic changes. Calcified granulomas noted right  lung base. Aortic root, mitral annular and coronary artery  calcifications noted.      ABDOMEN:      LIVER: Morphology. Hepatic steatosis.  BILE DUCTS: Normal caliber.  GALLBLADDER: No calcified gallstones. No wall thickening.  PANCREAS: Mild fatty atrophy of the pancreas.  SPLEEN: Within normal limits.  Accessory splenule noted.  ADRENALS: Within normal limits.  KIDNEYS and URETERS:  Symmetric renal enhancement. No hydronephrosis  or hydroureter. There is a 3 mm calculus in the lower pole of the  right kidney. Subcentimeter hypodense foci bilaterally are too small  to characterize. Bilateral perinephric stranding is nonspecific and  could be age related.      VESSELS:  Calcific atherosclerosis of the aortoiliac and mesenteric  vessels. No aortic aneurysm. RETROPERITONEUM: No pathologically  enlarged retroperitoneal lymph nodes.      PELVIS:      REPRODUCTIVE ORGANS: Prostate gland is enlarged protruding into the  base of the bladder. BLADDER: Bladder is moderately distended with  mild wall thickening.      BOWEL: No dilated bowel. Appendix is dilated measuring up to 12 mm.  There is an appendicolith of the base of the appendix. There is wall  thickening and surrounding inflammatory changes consistent with acute  appendicitis. No evidence for microperforation or abscess formation  at this time. Mild bowel wall thickening involving several loops of  small bowel in the right lower quadrant, likely reactive no  pneumatosis or portal venous gas. A few scattered colonic diverticula  without evidence for acute diverticulitis. PERITONEUM: No ascites or  free air, no fluid collection.      ABDOMINAL WALL: Bilateral inguinal hernias containing fat. Small  umbilical hernia contains fat. BONES: Multilevel degenerative changes  of the spine.       Impression:     Dilated appendix measuring 12 mm with periappendiceal inflammatory  changes and wall thickening consistent with acute appendicitis. No  evidence for microperforation or abscess formation. Surgical  consultation is advised.      Mild bowel wall thickening involving several loops of bowel in the  right lower quadrant, likely reactive. Diverticulosis without  evidence for acute diverticulitis.      Prostatomegaly with mild bladder wall thickening which may relate to  under distention or sequela of chronic outlet obstruction. Correlate  with urinalysis  to exclude infectious cystitis.      Nonobstructing right renal calculus.      Additional findings as described above.      MACRO:  None      Signed by: Corina Farmer 7/7/2024 3:59 AM  Dictation workstation:   [id number]                  Medical Problems         Problem List         * (Principal) Acute appendicitis with localized peritonitis, without perforation, abscess, or gangrene     Abnormal EKG     Abnormal stress test     Acute carpal tunnel syndrome, right     Acute sinusitis     Allergic rhinitis     Benign essential hypertension     Bilateral tinnitus     CAD (coronary artery disease)     Cervical vertigo     Chronic cough     Chronic glossitis     Claudication, intermittent (CMS-HCC)     Contact with and (suspected) exposure to covid-19     COPD, mild (Multi)     Mild chronic obstructive pulmonary disease (Multi)     Diabetes mellitus (Multi)     Diabetic peripheral neuropathy (Multi)     Disease suspected     Dizziness     Dry skin     Elevated serum creatinine     Fatigue     Hemiplegia (Multi)     History of elevated lipids     History of hypertension     HLD (hyperlipidemia)     Hyperkalemia     Hypogonadism in male     Imbalance     Insomnia     Left hemiparesis (Multi)     Low testosterone in male     Mild renal insufficiency     Nasal valve blockage     Numbness     Obstructive sleep apnea syndrome     Onychomycosis     Spondylosis of cervical spine     Osteoarthritis of spine with radiculopathy, cervical region     Left knee pain     Pain and swelling of lower leg, left     Pain of foot     Right hand paresthesia     Right hand weakness     Shortness of breath     Stroke (Multi)     Tinea corporis     Ulnar neuropathy at elbow, right                  Above medical problems may be reflective of historical medical problems that may have resolved and may not related to acute clinical condition/medical problems.     Clinical impression/plan:     Principal Problem:    Acute appendicitis with  localized peritonitis, without perforation, abscess, or gangrene        Acute appendicitis, complicated by localized peritonitis status post appendectomy   -Finding on CAT scan concerning for acute appendicitis.  Surgery has been consulted plan 4 OR today.  -Leukocytosis secondary appendicitis.  Continue Zosyn.         Atrial fibrillation     Atrial fibrillation in the postoperative setting, anticoagulation recommended by cardiology  HELLEN with underlying CKD  -Creatinine 1.67 on admission.  Continue IV fluid hydration  -Avoid nephrotoxins check renal functions in AM.     Diabetes mellitus  -Continue sliding scale insulin last A1c is from 2022 shows A1c of 6.5  -Recheck A1c     Coronary artery disease  -Continue with aspirin and Plavix postoperatively once okay with surgery.     Hypertension  -Holding losartan in setting of HELLEN resume once renal functions improved.     COPD  -Currently stable     Disposition/additional care plan/interventions: 7/10/2024     Transitioned to gram-negative coverage with Rocephin and anaerobic coverage and Flagyl as per infectious disease, continue stewardship as per ID....................Duration of antibiotics as per ID.         Nonacute appearing abdomen, hypoactive, constipation persists, MiraLAX added it appeared by the surgical service .     Will add IV fluids at this time.     Avoid opioids when possible     Nonacute appearing abdomen while constipation is suspected, may benefit from a CT scan of the abdomen and pelvis to rule out other concomitant pathology including but limited to manifestation of peritonitis/abscess.....................will defer to surgical service at this time.     Continue supportive care  Possible transition to oral therapy to cover anaerobic and gram-negative pathogen at the time of discharge, will defer to ID.     Monitor fever curve     Monitor leukocytosis.        Disposition/additional care plan/treatment interventions: 7/11/2024        Recommendation  for anticoagulation once cleared by surgery, appreciate input from Dr. Murdock  New onset atrial fibrillation.     Discussed heparin therapy with general surgery, workup for suspected intraperitoneal abscess with CTA and possible drainage.  Patient with heparin drip postprocedure.  Reported history of CVA in the past.     Continue monitor atrial fibrillation, acceptable ventricular response at this juncture, rate control therapy may be needed.     Monitor electrolytes     Bladder scan to evaluate for urinary retention     Clinically doubt bowel obstruction at this time.  Continue laxatives/stool softeners     Point tenderness on abdominal examination.................Plan CT scan of the abdomen to evaluate for abscess     Continue anaerobic and gram-negative intravenous antibiotic coverage.     Reported history of bradycardia.        Reported COPD did not appear in exacerbation at the time of evaluation.  Continue longitudinal therapy, beta agonist.     Continue monitor ventricular rate............... however reported history of bradycardia.     Last  TSH 3.52 years ago, will check TSH      Disposition/additional care plan/treatment interventions: 7/12/2024        Status post Ultrasound-guided drainageof abdominal abscess.    Continue antimicrobial stewardship as per ID, de-escalation/oral therapy timing as per ID.    Reported history of bradycardia with suspicion of sick sinus syndrome continue to monitor of rate control therapy    Agree with increasing losartan to optimize blood pressure control.    Transition from heparin infusion  to NOAC.  No Plavix and discharge as recommended by cardiology.    The patient was informed of differential diagnosis , work up , plan of care and possible sequelae of clinical disposition.Patient in agreement with plan of care. Further recommendations forthcoming in accordance with patient's clinical disposition and response to care.     Discharge planning:Discharge timing to be  determined, in accordance recommendation  from  surgical service.     Care time: > 55  mins              Dictation performed with assistance of voice recognition device therefore transcription errors are possible.

## 2024-07-12 NOTE — CARE PLAN
The patient's goals for the shift include      The clinical goals for the shift include pt will remain hemodynamically stable throughout the shift      Problem: Pain - Adult  Goal: Verbalizes/displays adequate comfort level or baseline comfort level  Outcome: Progressing     Problem: Safety - Adult  Goal: Free from fall injury  Outcome: Progressing     Problem: Discharge Planning  Goal: Discharge to home or other facility with appropriate resources  Outcome: Progressing     Problem: Chronic Conditions and Co-morbidities  Goal: Patient's chronic conditions and co-morbidity symptoms are monitored and maintained or improved  Outcome: Progressing     Problem: Diabetes  Goal: Achieve decreasing blood glucose levels by end of shift  Outcome: Progressing  Goal: Increase stability of blood glucose readings by end of shift  Outcome: Progressing  Goal: Decrease in ketones present in urine by end of shift  Outcome: Progressing  Goal: Maintain electrolyte levels within acceptable range throughout shift  Outcome: Progressing  Goal: Maintain glucose levels >70mg/dl to <250mg/dl throughout shift  Outcome: Progressing  Goal: No changes in neurological exam by end of shift  Outcome: Progressing  Goal: Learn about and adhere to nutrition recommendations by end of shift  Outcome: Progressing  Goal: Vital signs within normal range for age by end of shift  Outcome: Progressing  Goal: Increase self care and/or family involovement by end of shift  Outcome: Progressing  Goal: Receive DSME education by end of shift  Outcome: Progressing     Problem: Pain  Goal: Takes deep breaths with improved pain control throughout the shift  Outcome: Progressing  Goal: Turns in bed with improved pain control throughout the shift  Outcome: Progressing  Goal: Walks with improved pain control throughout the shift  Outcome: Progressing  Goal: Performs ADL's with improved pain control throughout shift  Outcome: Progressing  Goal: Participates in PT with  improved pain control throughout the shift  Outcome: Progressing  Goal: Free from opioid side effects throughout the shift  Outcome: Progressing  Goal: Free from acute confusion related to pain meds throughout the shift  Outcome: Progressing     Problem: Fall/Injury  Goal: Not fall by end of shift  Outcome: Progressing  Goal: Be free from injury by end of the shift  Outcome: Progressing  Goal: Verbalize understanding of personal risk factors for fall in the hospital  Outcome: Progressing  Goal: Verbalize understanding of risk factor reduction measures to prevent injury from fall in the home  Outcome: Progressing  Goal: Use assistive devices by end of the shift  Outcome: Progressing  Goal: Pace activities to prevent fatigue by end of the shift  Outcome: Progressing     Problem: Atrial Fibrillation  Goal: I will have no complications due to atrial fibrillation  Outcome: Progressing

## 2024-07-13 LAB
ANION GAP SERPL CALC-SCNC: 13 MMOL/L (ref 10–20)
ATRIAL RATE: 92 BPM
BUN SERPL-MCNC: 21 MG/DL (ref 6–23)
CALCIUM SERPL-MCNC: 8.3 MG/DL (ref 8.6–10.3)
CHLORIDE SERPL-SCNC: 106 MMOL/L (ref 98–107)
CO2 SERPL-SCNC: 20 MMOL/L (ref 21–32)
CREAT SERPL-MCNC: 1.01 MG/DL (ref 0.5–1.3)
EGFRCR SERPLBLD CKD-EPI 2021: 81 ML/MIN/1.73M*2
ERYTHROCYTE [DISTWIDTH] IN BLOOD BY AUTOMATED COUNT: 12.3 % (ref 11.5–14.5)
GLUCOSE BLD MANUAL STRIP-MCNC: 233 MG/DL (ref 74–99)
GLUCOSE BLD MANUAL STRIP-MCNC: 269 MG/DL (ref 74–99)
GLUCOSE BLD MANUAL STRIP-MCNC: 294 MG/DL (ref 74–99)
GLUCOSE BLD MANUAL STRIP-MCNC: 309 MG/DL (ref 74–99)
GLUCOSE SERPL-MCNC: 222 MG/DL (ref 74–99)
HCT VFR BLD AUTO: 40.7 % (ref 41–52)
HGB BLD-MCNC: 14.5 G/DL (ref 13.5–17.5)
MCH RBC QN AUTO: 33.6 PG (ref 26–34)
MCHC RBC AUTO-ENTMCNC: 35.6 G/DL (ref 32–36)
MCV RBC AUTO: 94 FL (ref 80–100)
NRBC BLD-RTO: 0 /100 WBCS (ref 0–0)
P AXIS: 72 DEGREES
PLATELET # BLD AUTO: 245 X10*3/UL (ref 150–450)
POTASSIUM SERPL-SCNC: 4 MMOL/L (ref 3.5–5.3)
PR INTERVAL: 191 MS
Q ONSET: 252 MS
QRS COUNT: 15 BEATS
QRS DURATION: 109 MS
QT INTERVAL: 397 MS
QTC CALCULATION(BAZETT): 497 MS
QTC FREDERICIA: 461 MS
R AXIS: -47 DEGREES
RBC # BLD AUTO: 4.32 X10*6/UL (ref 4.5–5.9)
SODIUM SERPL-SCNC: 135 MMOL/L (ref 136–145)
T AXIS: -8 DEGREES
T OFFSET: 451 MS
VENTRICULAR RATE: 94 BPM
WBC # BLD AUTO: 12.5 X10*3/UL (ref 4.4–11.3)

## 2024-07-13 PROCEDURE — 80048 BASIC METABOLIC PNL TOTAL CA: CPT | Performed by: HOSPITALIST

## 2024-07-13 PROCEDURE — 82947 ASSAY GLUCOSE BLOOD QUANT: CPT

## 2024-07-13 PROCEDURE — 36415 COLL VENOUS BLD VENIPUNCTURE: CPT | Performed by: HOSPITALIST

## 2024-07-13 PROCEDURE — 2500000002 HC RX 250 W HCPCS SELF ADMINISTERED DRUGS (ALT 637 FOR MEDICARE OP, ALT 636 FOR OP/ED): Performed by: SURGERY

## 2024-07-13 PROCEDURE — 85027 COMPLETE CBC AUTOMATED: CPT | Performed by: HOSPITALIST

## 2024-07-13 PROCEDURE — 2500000002 HC RX 250 W HCPCS SELF ADMINISTERED DRUGS (ALT 637 FOR MEDICARE OP, ALT 636 FOR OP/ED): Performed by: PHYSICIAN ASSISTANT

## 2024-07-13 PROCEDURE — 2500000002 HC RX 250 W HCPCS SELF ADMINISTERED DRUGS (ALT 637 FOR MEDICARE OP, ALT 636 FOR OP/ED): Performed by: HOSPITALIST

## 2024-07-13 PROCEDURE — 99233 SBSQ HOSP IP/OBS HIGH 50: CPT | Performed by: HOSPITALIST

## 2024-07-13 PROCEDURE — 2500000001 HC RX 250 WO HCPCS SELF ADMINISTERED DRUGS (ALT 637 FOR MEDICARE OP): Performed by: NURSE PRACTITIONER

## 2024-07-13 PROCEDURE — 2500000001 HC RX 250 WO HCPCS SELF ADMINISTERED DRUGS (ALT 637 FOR MEDICARE OP): Performed by: SURGERY

## 2024-07-13 PROCEDURE — 2500000001 HC RX 250 WO HCPCS SELF ADMINISTERED DRUGS (ALT 637 FOR MEDICARE OP): Performed by: INTERNAL MEDICINE

## 2024-07-13 PROCEDURE — 2060000001 HC INTERMEDIATE ICU ROOM DAILY

## 2024-07-13 PROCEDURE — 2500000004 HC RX 250 GENERAL PHARMACY W/ HCPCS (ALT 636 FOR OP/ED): Performed by: HOSPITALIST

## 2024-07-13 PROCEDURE — 2500000001 HC RX 250 WO HCPCS SELF ADMINISTERED DRUGS (ALT 637 FOR MEDICARE OP): Performed by: HOSPITALIST

## 2024-07-13 PROCEDURE — 2500000001 HC RX 250 WO HCPCS SELF ADMINISTERED DRUGS (ALT 637 FOR MEDICARE OP): Performed by: STUDENT IN AN ORGANIZED HEALTH CARE EDUCATION/TRAINING PROGRAM

## 2024-07-13 PROCEDURE — 2500000004 HC RX 250 GENERAL PHARMACY W/ HCPCS (ALT 636 FOR OP/ED): Mod: JZ | Performed by: INTERNAL MEDICINE

## 2024-07-13 RX ORDER — GUAIFENESIN 600 MG/1
600 TABLET, EXTENDED RELEASE ORAL 2 TIMES DAILY PRN
Status: DISCONTINUED | OUTPATIENT
Start: 2024-07-13 | End: 2024-07-15 | Stop reason: HOSPADM

## 2024-07-13 RX ORDER — DEXTROSE 50 % IN WATER (D50W) INTRAVENOUS SYRINGE
12.5
Status: DISCONTINUED | OUTPATIENT
Start: 2024-07-13 | End: 2024-07-15 | Stop reason: HOSPADM

## 2024-07-13 RX ORDER — SENNOSIDES 8.6 MG/1
1 TABLET ORAL 2 TIMES DAILY
Status: DISCONTINUED | OUTPATIENT
Start: 2024-07-13 | End: 2024-07-15 | Stop reason: HOSPADM

## 2024-07-13 RX ORDER — TAMSULOSIN HYDROCHLORIDE 0.4 MG/1
0.4 CAPSULE ORAL DAILY
Status: DISCONTINUED | OUTPATIENT
Start: 2024-07-13 | End: 2024-07-15 | Stop reason: HOSPADM

## 2024-07-13 RX ORDER — DEXTROSE 50 % IN WATER (D50W) INTRAVENOUS SYRINGE
25
Status: DISCONTINUED | OUTPATIENT
Start: 2024-07-13 | End: 2024-07-15 | Stop reason: HOSPADM

## 2024-07-13 RX ORDER — POLYETHYLENE GLYCOL 3350 17 G/17G
17 POWDER, FOR SOLUTION ORAL 2 TIMES DAILY
Status: DISCONTINUED | OUTPATIENT
Start: 2024-07-13 | End: 2024-07-15 | Stop reason: HOSPADM

## 2024-07-13 RX ADMIN — METRONIDAZOLE 500 MG: 500 INJECTION, SOLUTION INTRAVENOUS at 16:52

## 2024-07-13 RX ADMIN — METRONIDAZOLE 500 MG: 500 INJECTION, SOLUTION INTRAVENOUS at 06:57

## 2024-07-13 RX ADMIN — POLYETHYLENE GLYCOL 3350 17 G: 17 POWDER, FOR SOLUTION ORAL at 20:06

## 2024-07-13 RX ADMIN — APIXABAN 5 MG: 5 TABLET, FILM COATED ORAL at 09:15

## 2024-07-13 RX ADMIN — INSULIN LISPRO 4 UNITS: 100 INJECTION, SOLUTION INTRAVENOUS; SUBCUTANEOUS at 10:11

## 2024-07-13 RX ADMIN — ISOSORBIDE MONONITRATE 30 MG: 30 TABLET, EXTENDED RELEASE ORAL at 09:11

## 2024-07-13 RX ADMIN — EZETIMIBE 10 MG: 10 TABLET ORAL at 09:11

## 2024-07-13 RX ADMIN — SENNOSIDES 8.6 MG: 8.6 TABLET, FILM COATED ORAL at 20:06

## 2024-07-13 RX ADMIN — OXYCODONE HYDROCHLORIDE 5 MG: 5 TABLET ORAL at 01:37

## 2024-07-13 RX ADMIN — DOCUSATE SODIUM 100 MG: 100 CAPSULE, LIQUID FILLED ORAL at 09:11

## 2024-07-13 RX ADMIN — LOSARTAN POTASSIUM 100 MG: 50 TABLET, FILM COATED ORAL at 20:06

## 2024-07-13 RX ADMIN — INSULIN LISPRO 6 UNITS: 100 INJECTION, SOLUTION INTRAVENOUS; SUBCUTANEOUS at 18:37

## 2024-07-13 RX ADMIN — CLOPIDOGREL 75 MG: 75 TABLET ORAL at 09:12

## 2024-07-13 RX ADMIN — APIXABAN 5 MG: 5 TABLET, FILM COATED ORAL at 20:15

## 2024-07-13 RX ADMIN — DOCUSATE SODIUM 100 MG: 100 CAPSULE, LIQUID FILLED ORAL at 20:06

## 2024-07-13 RX ADMIN — CEFTRIAXONE SODIUM 2 G: 2 INJECTION, SOLUTION INTRAVENOUS at 05:57

## 2024-07-13 RX ADMIN — ASPIRIN 81 MG CHEWABLE TABLET 81 MG: 81 TABLET CHEWABLE at 09:12

## 2024-07-13 RX ADMIN — METRONIDAZOLE 500 MG: 500 INJECTION, SOLUTION INTRAVENOUS at 22:48

## 2024-07-13 RX ADMIN — INSULIN HUMAN 25 UNITS: 100 INJECTION, SUSPENSION SUBCUTANEOUS at 21:55

## 2024-07-13 ASSESSMENT — COGNITIVE AND FUNCTIONAL STATUS - GENERAL
CLIMB 3 TO 5 STEPS WITH RAILING: A LITTLE
MOVING TO AND FROM BED TO CHAIR: A LITTLE
STANDING UP FROM CHAIR USING ARMS: A LITTLE
TOILETING: A LITTLE
TOILETING: A LITTLE
WALKING IN HOSPITAL ROOM: A LITTLE
CLIMB 3 TO 5 STEPS WITH RAILING: A LITTLE
DAILY ACTIVITIY SCORE: 22
DAILY ACTIVITIY SCORE: 22
MOBILITY SCORE: 20
DRESSING REGULAR LOWER BODY CLOTHING: A LITTLE
STANDING UP FROM CHAIR USING ARMS: A LITTLE
WALKING IN HOSPITAL ROOM: A LITTLE
MOBILITY SCORE: 20
MOVING TO AND FROM BED TO CHAIR: A LITTLE
DRESSING REGULAR LOWER BODY CLOTHING: A LITTLE

## 2024-07-13 ASSESSMENT — PAIN SCALES - GENERAL
PAINLEVEL_OUTOF10: 0 - NO PAIN
PAINLEVEL_OUTOF10: 7
PAINLEVEL_OUTOF10: 2

## 2024-07-13 ASSESSMENT — PAIN - FUNCTIONAL ASSESSMENT
PAIN_FUNCTIONAL_ASSESSMENT: 0-10

## 2024-07-13 ASSESSMENT — PAIN DESCRIPTION - LOCATION: LOCATION: ABDOMEN

## 2024-07-13 NOTE — CARE PLAN
The clinical goals for the shift include pt will remain hemodynamically stable throughout the shift      Problem: Pain - Adult  Goal: Verbalizes/displays adequate comfort level or baseline comfort level  Outcome: Progressing     Problem: Safety - Adult  Goal: Free from fall injury  Outcome: Progressing     Problem: Discharge Planning  Goal: Discharge to home or other facility with appropriate resources  Outcome: Progressing     Problem: Chronic Conditions and Co-morbidities  Goal: Patient's chronic conditions and co-morbidity symptoms are monitored and maintained or improved  Outcome: Progressing     Problem: Diabetes  Goal: Achieve decreasing blood glucose levels by end of shift  Outcome: Progressing  Goal: Increase stability of blood glucose readings by end of shift  Outcome: Progressing  Goal: Decrease in ketones present in urine by end of shift  Outcome: Progressing  Goal: Maintain electrolyte levels within acceptable range throughout shift  Outcome: Progressing  Goal: Maintain glucose levels >70mg/dl to <250mg/dl throughout shift  Outcome: Progressing  Goal: No changes in neurological exam by end of shift  Outcome: Progressing  Goal: Learn about and adhere to nutrition recommendations by end of shift  Outcome: Progressing  Goal: Vital signs within normal range for age by end of shift  Outcome: Progressing  Goal: Increase self care and/or family involovement by end of shift  Outcome: Progressing  Goal: Receive DSME education by end of shift  Outcome: Progressing     Problem: Pain  Goal: Takes deep breaths with improved pain control throughout the shift  Outcome: Progressing  Goal: Turns in bed with improved pain control throughout the shift  Outcome: Progressing  Goal: Walks with improved pain control throughout the shift  Outcome: Progressing  Goal: Performs ADL's with improved pain control throughout shift  Outcome: Progressing  Goal: Participates in PT with improved pain control throughout the  shift  Outcome: Progressing  Goal: Free from opioid side effects throughout the shift  Outcome: Progressing  Goal: Free from acute confusion related to pain meds throughout the shift  Outcome: Progressing     Problem: Fall/Injury  Goal: Not fall by end of shift  Outcome: Progressing  Goal: Be free from injury by end of the shift  Outcome: Progressing  Goal: Verbalize understanding of personal risk factors for fall in the hospital  Outcome: Progressing  Goal: Verbalize understanding of risk factor reduction measures to prevent injury from fall in the home  Outcome: Progressing  Goal: Use assistive devices by end of the shift  Outcome: Progressing  Goal: Pace activities to prevent fatigue by end of the shift  Outcome: Progressing     Problem: Atrial Fibrillation  Goal: I will have no complications due to atrial fibrillation  Outcome: Progressing

## 2024-07-13 NOTE — CARE PLAN
Pt remained safe and stable throughout shift. Abdominal site remains intact with no evidence of drainage. Pt endorses abdominal discomfort that's exacerbated by coughing. Pt encouraged to do abdominal splinting for some relief but has stated that it's not helping.  Hospitalist notified of recommendation for cough suppressing medication. Pt continues to report constipation despite bowel regimen. Pt also denies passing gas. Bowel sounds are present x 4 quadrants. Pt expressing concerns over insulin administration. He states that he is not receiving enough coverage--he is getting 3 units with meals presently. Lunch insulin dose missed due to patient not eating lunch. Pt encouraged to eat dinner this evening.      Problem: Pain - Adult  Goal: Verbalizes/displays adequate comfort level or baseline comfort level  Outcome: Progressing     Problem: Safety - Adult  Goal: Free from fall injury  Outcome: Progressing     Problem: Discharge Planning  Goal: Discharge to home or other facility with appropriate resources  Outcome: Progressing     Problem: Chronic Conditions and Co-morbidities  Goal: Patient's chronic conditions and co-morbidity symptoms are monitored and maintained or improved  Outcome: Progressing     Problem: Diabetes  Goal: Achieve decreasing blood glucose levels by end of shift  Outcome: Progressing  Goal: Increase stability of blood glucose readings by end of shift  Outcome: Progressing  Goal: Decrease in ketones present in urine by end of shift  Outcome: Progressing  Goal: Maintain electrolyte levels within acceptable range throughout shift  Outcome: Progressing  Goal: Maintain glucose levels >70mg/dl to <250mg/dl throughout shift  Outcome: Progressing  Goal: No changes in neurological exam by end of shift  Outcome: Progressing  Goal: Learn about and adhere to nutrition recommendations by end of shift  Outcome: Progressing  Goal: Vital signs within normal range for age by end of shift  Outcome:  Progressing  Goal: Increase self care and/or family involovement by end of shift  Outcome: Progressing  Goal: Receive DSME education by end of shift  Outcome: Progressing     Problem: Pain  Goal: Takes deep breaths with improved pain control throughout the shift  Outcome: Progressing  Goal: Turns in bed with improved pain control throughout the shift  Outcome: Progressing  Goal: Walks with improved pain control throughout the shift  Outcome: Progressing  Goal: Performs ADL's with improved pain control throughout shift  Outcome: Progressing  Goal: Participates in PT with improved pain control throughout the shift  Outcome: Progressing  Goal: Free from opioid side effects throughout the shift  Outcome: Progressing  Goal: Free from acute confusion related to pain meds throughout the shift  Outcome: Progressing     Problem: Fall/Injury  Goal: Not fall by end of shift  Outcome: Progressing  Goal: Be free from injury by end of the shift  Outcome: Progressing  Goal: Verbalize understanding of personal risk factors for fall in the hospital  Outcome: Progressing  Goal: Verbalize understanding of risk factor reduction measures to prevent injury from fall in the home  Outcome: Progressing  Goal: Use assistive devices by end of the shift  Outcome: Progressing  Goal: Pace activities to prevent fatigue by end of the shift  Outcome: Progressing     Problem: Atrial Fibrillation  Goal: I will have no complications due to atrial fibrillation  Outcome: Progressing

## 2024-07-13 NOTE — PROGRESS NOTES
[facility] INFECTIOUS DISEASE PROGRESS NOTE    Patient Name: Jose Olivia  MRN: [id number]    INTERVAL HISTORY:   No fevers. WBC are better. Complains of abx pain wendy w coughing    Patient Active Problem List   Diagnosis    Abnormal EKG    Abnormal stress test    Acute carpal tunnel syndrome, right    Acute sinusitis    Allergic rhinitis    Benign essential hypertension    Bilateral tinnitus    CAD (coronary artery disease)    Cervical vertigo    Chronic cough    Chronic glossitis    Claudication, intermittent (CMS-HCC)    Contact with and (suspected) exposure to covid-19    COPD, mild (Multi)    Mild chronic obstructive pulmonary disease (Multi)    Diabetes mellitus (Multi)    Diabetic peripheral neuropathy (Multi)    Disease suspected    Dizziness    Dry skin    Elevated serum creatinine    Fatigue    Hemiplegia (Multi)    History of elevated lipids    History of hypertension    HLD (hyperlipidemia)    Hyperkalemia    Hypogonadism in male    Imbalance    Insomnia    Left hemiparesis (Multi)    Low testosterone in male    Mild renal insufficiency    Nasal valve blockage    Numbness    Obstructive sleep apnea syndrome    Onychomycosis    Spondylosis of cervical spine    Osteoarthritis of spine with radiculopathy, cervical region    Left knee pain    Pain and swelling of lower leg, left    Pain of foot    Right hand paresthesia    Right hand weakness    Shortness of breath    Stroke (Multi)    Tinea corporis    Ulnar neuropathy at elbow, right    Acute appendicitis with localized peritonitis, without perforation, abscess, or gangrene        ASSESSMENT:   Perf Appendicitis  Localized peritonitis  HELLEN on CKD 3  DM2   CAD  COPD  HTN     Plan   Continue Ceftriaxone and Flagyl  Follow cultures  Bcx x 2 w fevers   Serial abd exams    If advances with diet then discharge plan will be on oral Omnicef and Flagyl to complete 10 days of therapy postoperatively.  Repeat imaging may be warranted for worsening symptoms,  leukocytosis or adverse abdominal examination    MEDICATIONS: reviewed.    Current Facility-Administered Medications:     acetaminophen (Tylenol) tablet 650 mg, 650 mg, oral, q4h PRN, Woody Chan MD, 650 mg at 07/10/24 1209    albuterol 2.5 mg /3 mL (0.083 %) nebulizer solution 2.5 mg, 2.5 mg, nebulization, q6h PRN, Jaspal Condon DO    apixaban (Eliquis) tablet 5 mg, 5 mg, oral, q12h, Maribel Burgos, APRN-CNP, 5 mg at 07/12/24 1003    aspirin chewable tablet 81 mg, 81 mg, oral, Daily, Gilles Hobson MD, 81 mg at 07/12/24 0800    cefTRIAXone (Rocephin) 2 g in dextrose 5% in water (D5W) 50 mL, 2 g, intravenous, q24h, Hamlet Wolff MD, Stopped at 07/12/24 0652    clopidogrel (Plavix) tablet 75 mg, 75 mg, oral, Daily, Jori Blanco MD, 75 mg at 07/12/24 0800    dextrose 50 % injection 12.5 g, 12.5 g, intravenous, q15 min PRN, Gilles Hobson MD    dextrose 50 % injection 25 g, 25 g, intravenous, q15 min PRN, Gilles Hobson MD    docusate sodium (Colace) capsule 100 mg, 100 mg, oral, BID, Jaspal Condon DO, 100 mg at 07/12/24 0800    ezetimibe (Zetia) tablet 10 mg, 10 mg, oral, Daily, Gilles Hobson MD, 10 mg at 07/12/24 0800    glucagon (Glucagen) injection 1 mg, 1 mg, intramuscular, q15 min PRN, Gilles Hobson MD    glucagon (Glucagen) injection 1 mg, 1 mg, intramuscular, q15 min PRN, Gilles Hobson MD    HYDROmorphone PF (Dilaudid) injection 0.2 mg, 0.2 mg, intravenous, q3h PRN, Riley Reyes DO, 0.2 mg at 07/11/24 0811    insulin lispro (HumaLOG) injection 0-10 Units, 0-10 Units, subcutaneous, TID, Gilles Hobson MD, 8 Units at 07/12/24 1608    isosorbide mononitrate ER (Imdur) 24 hr tablet 30 mg, 30 mg, oral, Daily, Gilles Hobson MD, 30 mg at 07/12/24 0800    losartan (Cozaar) tablet 100 mg, 100 mg, oral, Daily, Maribel Burgos, APRN-CNP    metroNIDAZOLE (Flagyl) 500 mg in NaCl (iso-os) 100 mL, 500 mg, intravenous, q8h, Hamlet Wolff MD, Stopped at 07/12/24 1511    ondansetron (Zofran) injection 4 mg, 4 mg,  intravenous, q6h PRN, Gilles Hobson MD, 4 mg at 24 0646    oxyCODONE (Roxicodone) immediate release tablet 10 mg, 10 mg, oral, q4h PRN, Woody Chan MD, 10 mg at 07/10/24 2143    oxyCODONE (Roxicodone) immediate release tablet 5 mg, 5 mg, oral, q4h PRN, Woody Chan MD, 5 mg at 24 213    perflutren lipid microspheres (Definity) injection 0.5-10 mL of dilution, 0.5-10 mL of dilution, intravenous, Once in imaging, Diogo Murdock MD    perflutren lipid microspheres (Definity) injection 0.5-10 mL of dilution, 0.5-10 mL of dilution, intravenous, Once in imaging, Diogo Murdock MD    perflutren protein A microsphere (Optison) injection 0.5 mL, 0.5 mL, intravenous, Once in imaging, Diogo Murdock MD    polyethylene glycol (Glycolax, Miralax) packet 17 g, 17 g, oral, Daily, Jaspal Condon DO    sennosides (Senokot) tablet 8.6 mg, 1 tablet, oral, Nightly, Jaspal Condon DO, 8.6 mg at 24    sulfur hexafluoride microsphr (Lumason) injection 24.28 mg, 2 mL, intravenous, Once in imaging, Diogo Murdock MD    sulfur hexafluoride microsphr (Lumason) injection 24.28 mg, 2 mL, intravenous, Once in imaging, Diogo Murdock MD     PHYSICAL EXAM:  Vital signs: /73 (BP Location: Left arm, Patient Position: Lying)   Pulse 83   Temp 36.4 °C (97.5 °F) (Temporal)   Resp 18   Ht 1.829 m (6')   Wt 92.1 kg (203 lb 0.7 oz)   SpO2 97%   BMI 27.54 kg/m²   Temp (24hrs), Av.7 °C (98 °F), Min:36.1 °C (97 °F), Max:37.2 °C (99 °F)    General: alert, oriented, NAD  Lungs: bilaterally clear to auscultation  Heart: regular rate and rhythm  Abdomen: soft, + tender, non distended, BS+  Extremities: no edema  No rashes  No joint inflammation  Neck supple  Lines ok  No CVAT    Labs:    Results for orders placed or performed during the hospital encounter of 24 (from the past 96 hour(s))   POCT GLUCOSE   Result Value Ref Range    POCT Glucose 82 74 - 99 mg/dL   CBC   Result Value Ref Range    WBC 12.0 (H) 4.4 -  11.3 x10*3/uL    nRBC 0.0 0.0 - 0.0 /100 WBCs    RBC 4.16 (L) 4.50 - 5.90 x10*6/uL    Hemoglobin 13.6 13.5 - 17.5 g/dL    Hematocrit 40.9 (L) 41.0 - 52.0 %    MCV 98 80 - 100 fL    MCH 32.7 26.0 - 34.0 pg    MCHC 33.3 32.0 - 36.0 g/dL    RDW 13.0 11.5 - 14.5 %    Platelets 182 150 - 450 x10*3/uL   Basic Metabolic Panel   Result Value Ref Range    Glucose 105 (H) 74 - 99 mg/dL    Sodium 136 136 - 145 mmol/L    Potassium 4.1 3.5 - 5.3 mmol/L    Chloride 105 98 - 107 mmol/L    Bicarbonate 26 21 - 32 mmol/L    Anion Gap 9 (L) 10 - 20 mmol/L    Urea Nitrogen 29 (H) 6 - 23 mg/dL    Creatinine 1.53 (H) 0.50 - 1.30 mg/dL    eGFR 49 (L) >60 mL/min/1.73m*2    Calcium 8.2 (L) 8.6 - 10.3 mg/dL   POCT GLUCOSE   Result Value Ref Range    POCT Glucose 107 (H) 74 - 99 mg/dL   POCT GLUCOSE   Result Value Ref Range    POCT Glucose 98 74 - 99 mg/dL   POCT GLUCOSE   Result Value Ref Range    POCT Glucose 166 (H) 74 - 99 mg/dL   POCT GLUCOSE   Result Value Ref Range    POCT Glucose 147 (H) 74 - 99 mg/dL   Basic Metabolic Panel   Result Value Ref Range    Glucose 156 (H) 74 - 99 mg/dL    Sodium 133 (L) 136 - 145 mmol/L    Potassium 3.9 3.5 - 5.3 mmol/L    Chloride 102 98 - 107 mmol/L    Bicarbonate 21 21 - 32 mmol/L    Anion Gap 14 10 - 20 mmol/L    Urea Nitrogen 25 (H) 6 - 23 mg/dL    Creatinine 1.32 (H) 0.50 - 1.30 mg/dL    eGFR 58 (L) >60 mL/min/1.73m*2    Calcium 8.5 (L) 8.6 - 10.3 mg/dL   CBC   Result Value Ref Range    WBC 12.3 (H) 4.4 - 11.3 x10*3/uL    nRBC 0.0 0.0 - 0.0 /100 WBCs    RBC 4.38 (L) 4.50 - 5.90 x10*6/uL    Hemoglobin 14.6 13.5 - 17.5 g/dL    Hematocrit 42.4 41.0 - 52.0 %    MCV 97 80 - 100 fL    MCH 33.3 26.0 - 34.0 pg    MCHC 34.4 32.0 - 36.0 g/dL    RDW 12.6 11.5 - 14.5 %    Platelets 186 150 - 450 x10*3/uL   Magnesium   Result Value Ref Range    Magnesium 1.72 1.60 - 2.40 mg/dL   POCT GLUCOSE   Result Value Ref Range    POCT Glucose 181 (H) 74 - 99 mg/dL   POCT GLUCOSE   Result Value Ref Range    POCT Glucose 233  (H) 74 - 99 mg/dL   POCT GLUCOSE   Result Value Ref Range    POCT Glucose 142 (H) 74 - 99 mg/dL   POCT GLUCOSE   Result Value Ref Range    POCT Glucose 242 (H) 74 - 99 mg/dL   Electrocardiogram, 12-lead PRN ACS symptoms   Result Value Ref Range    Ventricular Rate 70 BPM    Atrial Rate 70 BPM    QRS Duration 90 ms    QT Interval 410 ms    QTC Calculation(Bazett) 442 ms    R Axis -48 degrees    T Axis 15 degrees    QRS Count 11 beats    Q Onset 211 ms    T Offset 416 ms    QTC Fredericia 431 ms   Basic Metabolic Panel   Result Value Ref Range    Glucose 179 (H) 74 - 99 mg/dL    Sodium 135 (L) 136 - 145 mmol/L    Potassium 4.4 3.5 - 5.3 mmol/L    Chloride 110 (H) 98 - 107 mmol/L    Bicarbonate 14 (L) 21 - 32 mmol/L    Anion Gap 15 10 - 20 mmol/L    Urea Nitrogen 23 6 - 23 mg/dL    Creatinine 0.96 0.50 - 1.30 mg/dL    eGFR 86 >60 mL/min/1.73m*2    Calcium 7.2 (L) 8.6 - 10.3 mg/dL   CBC   Result Value Ref Range    WBC 12.5 (H) 4.4 - 11.3 x10*3/uL    nRBC 0.0 0.0 - 0.0 /100 WBCs    RBC 4.57 4.50 - 5.90 x10*6/uL    Hemoglobin 15.2 13.5 - 17.5 g/dL    Hematocrit 43.7 41.0 - 52.0 %    MCV 96 80 - 100 fL    MCH 33.3 26.0 - 34.0 pg    MCHC 34.8 32.0 - 36.0 g/dL    RDW 12.5 11.5 - 14.5 %    Platelets 206 150 - 450 x10*3/uL   POCT GLUCOSE   Result Value Ref Range    POCT Glucose 190 (H) 74 - 99 mg/dL   Lactate   Result Value Ref Range    Lactate 0.8 0.4 - 2.0 mmol/L   POCT GLUCOSE   Result Value Ref Range    POCT Glucose 159 (H) 74 - 99 mg/dL   Urinalysis with Reflex Culture and Microscopic   Result Value Ref Range    Color, Urine Yellow Light-Yellow, Yellow, Dark-Yellow    Appearance, Urine Clear Clear    Specific Gravity, Urine 1.020 1.005 - 1.035    pH, Urine 6.0 5.0, 5.5, 6.0, 6.5, 7.0, 7.5, 8.0    Protein, Urine 100 (2+) (A) NEGATIVE, 10 (TRACE), 20 (TRACE) mg/dL    Glucose, Urine 300 (3+) (A) Normal mg/dL    Blood, Urine NEGATIVE NEGATIVE    Ketones, Urine 40 (2+) (A) NEGATIVE mg/dL    Bilirubin, Urine NEGATIVE NEGATIVE     Urobilinogen, Urine Normal Normal mg/dL    Nitrite, Urine NEGATIVE NEGATIVE    Leukocyte Esterase, Urine NEGATIVE NEGATIVE   Extra Urine Gray Tube   Result Value Ref Range    Extra Tube Hold for add-ons.    Urine culture    Specimen: Indwelling (Mcintosh) Catheter; Urine   Result Value Ref Range    Urine Culture No growth    Urinalysis Microscopic   Result Value Ref Range    WBC, Urine 1-5 1-5, NONE /HPF    RBC, Urine 1-2 NONE, 1-2, 3-5 /HPF    Budding Yeast, Urine PRESENT (A) NONE /HPF    Mucus, Urine FEW Reference range not established. /LPF    Hyaline Casts, Urine OCCASIONAL (A) NONE /LPF   Transthoracic Echo (TTE) Complete   Result Value Ref Range    LVOT diam 2.30 cm    LV Biplane EF 67 %    Tricuspid annular plane systolic excursion 2.7 cm    AV mn grad 5.0 mmHg    LA vol index A/L 20.7 ml/m2    AV pk efrain 1.63 m/s    LV EF 63 %    RV free wall pk S' 18.60 cm/s    RVSP 26.8 mmHg    LVIDd 4.50 cm    Aortic Valve Area by Continuity of VTI 2.47 cm2    Aortic Valve Area by Continuity of Peak Velocity 2.43 cm2    AV pk grad 10.6 mmHg    LV A4C EF 64.3     BSA 2.19 m2   Sterile Fluid Culture/Smear    Specimen: Intra-abdominal Abscess; Fluid   Result Value Ref Range    Sterile Fluid Culture/Smear No growth to date     Gram Stain (4+) Abundant Polymorphonuclear leukocytes     Gram Stain No organisms seen    POCT GLUCOSE   Result Value Ref Range    POCT Glucose 262 (H) 74 - 99 mg/dL   Protime-INR   Result Value Ref Range    Protime 13.2 (H) 9.8 - 12.8 seconds    INR 1.2 (H) 0.9 - 1.1   aPTT - baseline   Result Value Ref Range    aPTT 28 27 - 38 seconds   TSH   Result Value Ref Range    Thyroid Stimulating Hormone 4.04 (H) 0.44 - 3.98 mIU/L   POCT GLUCOSE   Result Value Ref Range    POCT Glucose 208 (H) 74 - 99 mg/dL   Heparin Assay, UFH   Result Value Ref Range    Heparin Unfractionated 0.5 See Comment Below for Therapeutic Ranges IU/mL   Heparin Assay, UFH   Result Value Ref Range    Heparin Unfractionated 0.3 See  Comment Below for Therapeutic Ranges IU/mL   Basic Metabolic Panel   Result Value Ref Range    Glucose 188 (H) 74 - 99 mg/dL    Sodium 135 (L) 136 - 145 mmol/L    Potassium 3.8 3.5 - 5.3 mmol/L    Chloride 105 98 - 107 mmol/L    Bicarbonate 20 (L) 21 - 32 mmol/L    Anion Gap 14 10 - 20 mmol/L    Urea Nitrogen 21 6 - 23 mg/dL    Creatinine 1.05 0.50 - 1.30 mg/dL    eGFR 77 >60 mL/min/1.73m*2    Calcium 8.5 (L) 8.6 - 10.3 mg/dL   CBC   Result Value Ref Range    WBC 11.8 (H) 4.4 - 11.3 x10*3/uL    nRBC 0.0 0.0 - 0.0 /100 WBCs    RBC 4.48 (L) 4.50 - 5.90 x10*6/uL    Hemoglobin 14.9 13.5 - 17.5 g/dL    Hematocrit 42.3 41.0 - 52.0 %    MCV 94 80 - 100 fL    MCH 33.3 26.0 - 34.0 pg    MCHC 35.2 32.0 - 36.0 g/dL    RDW 12.5 11.5 - 14.5 %    Platelets 229 150 - 450 x10*3/uL   Heparin Assay, UFH   Result Value Ref Range    Heparin Unfractionated 0.3 See Comment Below for Therapeutic Ranges IU/mL   POCT GLUCOSE   Result Value Ref Range    POCT Glucose 193 (H) 74 - 99 mg/dL   POCT GLUCOSE   Result Value Ref Range    POCT Glucose 211 (H) 74 - 99 mg/dL   POCT GLUCOSE   Result Value Ref Range    POCT Glucose 309 (H) 74 - 99 mg/dL   POCT GLUCOSE   Result Value Ref Range    POCT Glucose 292 (H) 74 - 99 mg/dL        Microbiology data: reviewed    Imaging data: reviewed      Hamlet Wolff MD  [phone]  7/12/2024  8:18 PM

## 2024-07-13 NOTE — PROGRESS NOTES
Jose Olivia [id number]   Service: Internal Medicine / Hospitalist Date of service: 7/13/2024                                   Full Code         Overnight Events: No new overnight events reported by patient or nursing.      Subjective        History Of Present Illness  Jose Olivia is a 68 y.o. male with past medical history of HTN, CAD, mild COPD, DM with diabetic peripheral neuropathy, hyperlipidemia, mild renal insufficiency and CVA 2018 (on aspirin and Plavix since that time) comes to the hospital with abdominal pain.  The patient states that his pain began yesterday morning while he was working on his water heater.  According the patient the pain moved from the middle of the abdomen more to the right.  He reported some nausea and vomiting.  He also reported some diarrhea.  He reported some chills and sweats but denies any chest pain or shortness of breath.  Initially the patient wanted to leave AMA to finish his water heater work but then later decided to stay for surgical intervention.  At this time the patient states his abdominal pain is improved and he is awaiting to go to the OR for surgery later today.         7/8.................No reported: Chest pains, dyspnea, orthopnea, palpitations, fevers or chills.Patient reported that overall he was doing well given recent surgery.  He endorsed some abdominal pain related to recent surgery.     7/9..............The patient reported feeling better today no reported : nausea, vomiting, chest pains or palpitations.The patient felt that his postop related abdominal pain was tolerable.        7/10................No reported: Palpitations, headaches, chest pains, fevers, chills, nausea or vomiting.  Patient endorsed constipation.     7/11.......................  No reported: Syncope, presyncope, chest pains, nausea, vomiting, palpitations or tremors.However patient did endorse wheezing and shortness of breath this morning.     7/12................  No reported:  Nausea, vomiting, headaches, chest pains, palpitations, fevers or chills.   Patient endorsed 2 bowel movements, no abdominal pain reported at time of evaluation.    7/13..................No reported  :palpitations, syncope, presyncope, nausea, vomiting, fevers or chills.Patient endorsed constipation today.  Recent urinary retention prompted placement of Mcintosh catheter.      Review of Systems:   Review of system otherwise negative if not aforementioned above in subjective.     Objective        Physical Exam      Constitutional:       Appearance: Patient appeared in no acute cardiopulmonary distress.     Comments: Patient alert and oriented to person place time and situation.  HEENT:      Head: Normocephalic and atraumatic.Trachea midline      Nose:No observed congestion or rhinorrhea.     Mouth/Throat: Mucous membranes Moist, Trachea appeared  midline.  Eyes:      Extraocular Movements: Extraocular movements intact.      Pupils: Pupils are equal, round, and reactive to light.      Comments: No scleral icterus or conjunctival injection appreciated.   Cardiovascular:      Rate and Rhythm: Irregular rate and rhythm.No peripheral stigmata of endocarditis appreciated.     Pulmonary:      Lungs appeared clear to auscultation, no adventitious sound appreciated.  Abdominal:      General: Status post surgical intervention, abdomen nondistended, hypoactive bowel sounds, no involuntary guarding or rebound tenderness appreciated.     Comments: Point tenderness on palpation.  Musculoskeletal:       Patient appeared to have full active range of motion for upper and lower extremities, no acute apparent joint deformity appreciated on examination.   No pitting edema or cyanosis appreciated.       Lymphadenopathy:      No appreciable palpable lymphadenopathy  Skin:     General: Skin is warm.      Coloration:  No jaundice     Findings: No Pandey Banks's or Mcalister signs appreciated.  No appreciation of erythema or signs of infectious  process related to surgical site/Laparoscopic incisions,dressing removed today..  Neurological:      General: No focal sensory or motor deficits appreciated, no meningeal signs or dysmetria noted.      Cranial Nerves: Cranial nerves II to XII appearing grossly intact.     Genitals:  Deferred  Psychiatric:         The patient appears to be displaying normal mood and affect at the time of evaluation.     Labs:           Lab Results   Component Value Date     GLUCOSE 188 (H) 07/12/2024     CALCIUM 8.5 (L) 07/12/2024      (L) 07/12/2024     K 3.8 07/12/2024     CO2 20 (L) 07/12/2024      07/12/2024     BUN 21 07/12/2024     CREATININE 1.05 07/12/2024            Lab Results   Component Value Date     WBC 11.8 (H) 07/12/2024     HGB 14.9 07/12/2024     HCT 42.3 07/12/2024     MCV 94 07/12/2024      07/12/2024      Lab Results   Component Value Date    GLUCOSE 222 (H) 07/13/2024    CALCIUM 8.3 (L) 07/13/2024     (L) 07/13/2024    K 4.0 07/13/2024    CO2 20 (L) 07/13/2024     07/13/2024    BUN 21 07/13/2024    CREATININE 1.01 07/13/2024      Lab Results   Component Value Date    WBC 12.5 (H) 07/13/2024    HGB 14.5 07/13/2024    HCT 40.7 (L) 07/13/2024    MCV 94 07/13/2024     07/13/2024         [unfilled]   [unfilled]   No results found for the last 90 days.                  X-rays/ Images     [unfilled]   Radiology Results (last 21 days)     Procedure Component Value Units Date/Time   CT abdomen pelvis w IV contrast [[id number]] Collected: 07/07/24 0400   Order Status: Completed Updated: 07/07/24 0400   Narrative:     Interpreted By:  Corina Farmer,  STUDY:  CT ABDOMEN PELVIS W IV CONTRAST;  7/7/2024 3:45 am      INDICATION:  Signs/Symptoms:RLQ tenderness guarding.      COMPARISON:  CT scan of the abdomen pelvis 09/19/2011      ACCESSION NUMBER(S):  [id number]      ORDERING CLINICIAN:  LIVAN FLORES      TECHNIQUE:  Axial CT images of the abdomen and pelvis with coronal and  sagittal  reconstructed images obtained after intravenous administration of 75  mL of Omnipaque 350      FINDINGS:  LOWER CHEST: Peripheral reticular opacities in the lung bases likely  relate to chronic fibrotic changes. Calcified granulomas noted right  lung base. Aortic root, mitral annular and coronary artery  calcifications noted.      ABDOMEN:      LIVER: Morphology. Hepatic steatosis.  BILE DUCTS: Normal caliber.  GALLBLADDER: No calcified gallstones. No wall thickening.  PANCREAS: Mild fatty atrophy of the pancreas.  SPLEEN: Within normal limits.  Accessory splenule noted.  ADRENALS: Within normal limits.  KIDNEYS and URETERS: Symmetric renal enhancement. No hydronephrosis  or hydroureter. There is a 3 mm calculus in the lower pole of the  right kidney. Subcentimeter hypodense foci bilaterally are too small  to characterize. Bilateral perinephric stranding is nonspecific and  could be age related.      VESSELS:  Calcific atherosclerosis of the aortoiliac and mesenteric  vessels. No aortic aneurysm. RETROPERITONEUM: No pathologically  enlarged retroperitoneal lymph nodes.      PELVIS:      REPRODUCTIVE ORGANS: Prostate gland is enlarged protruding into the  base of the bladder. BLADDER: Bladder is moderately distended with  mild wall thickening.      BOWEL: No dilated bowel. Appendix is dilated measuring up to 12 mm.  There is an appendicolith of the base of the appendix. There is wall  thickening and surrounding inflammatory changes consistent with acute  appendicitis. No evidence for microperforation or abscess formation  at this time. Mild bowel wall thickening involving several loops of  small bowel in the right lower quadrant, likely reactive no  pneumatosis or portal venous gas. A few scattered colonic diverticula  without evidence for acute diverticulitis. PERITONEUM: No ascites or  free air, no fluid collection.      ABDOMINAL WALL: Bilateral inguinal hernias containing fat. Small  umbilical hernia  contains fat. BONES: Multilevel degenerative changes  of the spine.       Impression:     Dilated appendix measuring 12 mm with periappendiceal inflammatory  changes and wall thickening consistent with acute appendicitis. No  evidence for microperforation or abscess formation. Surgical  consultation is advised.      Mild bowel wall thickening involving several loops of bowel in the  right lower quadrant, likely reactive. Diverticulosis without  evidence for acute diverticulitis.      Prostatomegaly with mild bladder wall thickening which may relate to  under distention or sequela of chronic outlet obstruction. Correlate  with urinalysis to exclude infectious cystitis.      Nonobstructing right renal calculus.      Additional findings as described above.      MACRO:  None      Signed by: Corina Farmer 7/7/2024 3:59 AM  Dictation workstation:   [id number]                  Medical Problems         Problem List         * (Principal) Acute appendicitis with localized peritonitis, without perforation, abscess, or gangrene     Abnormal EKG     Abnormal stress test     Acute carpal tunnel syndrome, right     Acute sinusitis     Allergic rhinitis     Benign essential hypertension     Bilateral tinnitus     CAD (coronary artery disease)     Cervical vertigo     Chronic cough     Chronic glossitis     Claudication, intermittent (CMS-HCC)     Contact with and (suspected) exposure to covid-19     COPD, mild (Multi)     Mild chronic obstructive pulmonary disease (Multi)     Diabetes mellitus (Multi)     Diabetic peripheral neuropathy (Multi)     Disease suspected     Dizziness     Dry skin     Elevated serum creatinine     Fatigue     Hemiplegia (Multi)     History of elevated lipids     History of hypertension     HLD (hyperlipidemia)     Hyperkalemia     Hypogonadism in male     Imbalance     Insomnia     Left hemiparesis (Multi)     Low testosterone in male     Mild renal insufficiency     Nasal valve blockage     Numbness      Obstructive sleep apnea syndrome     Onychomycosis     Spondylosis of cervical spine     Osteoarthritis of spine with radiculopathy, cervical region     Left knee pain     Pain and swelling of lower leg, left     Pain of foot     Right hand paresthesia     Right hand weakness     Shortness of breath     Stroke (Multi)     Tinea corporis     Ulnar neuropathy at elbow, right                  Above medical problems may be reflective of historical medical problems that may have resolved and may not related to acute clinical condition/medical problems.     Clinical impression/plan:     Principal Problem:    Acute appendicitis with localized peritonitis, without perforation, abscess, or gangrene        Acute appendicitis, complicated by localized peritonitis status post appendectomy   -Finding on CAT scan concerning for acute appendicitis.  Surgery has been consulted plan 4 OR today.  -Leukocytosis secondary appendicitis.  Continue Zosyn.         Atrial fibrillation     Atrial fibrillation in the postoperative setting, anticoagulation recommended by cardiology  HELLEN with underlying CKD  -Creatinine 1.67 on admission.  Continue IV fluid hydration  -Avoid nephrotoxins check renal functions in AM.     Diabetes mellitus  -Continue sliding scale insulin last A1c is from 2022 shows A1c of 6.5  -Recheck A1c     Coronary artery disease  -Continue with aspirin and Plavix postoperatively once okay with surgery.     Hypertension  -Holding losartan in setting of HELLEN resume once renal functions improved.     COPD  -Currently stable     Disposition/additional care plan/interventions: 7/10/2024     Transitioned to gram-negative coverage with Rocephin and anaerobic coverage and Flagyl as per infectious disease, continue stewardship as per ID....................Duration of antibiotics as per ID.         Nonacute appearing abdomen, hypoactive, constipation persists, MiraLAX added it appeared by the surgical service .     Will add IV  fluids at this time.     Avoid opioids when possible     Nonacute appearing abdomen while constipation is suspected, may benefit from a CT scan of the abdomen and pelvis to rule out other concomitant pathology including but limited to manifestation of peritonitis/abscess.....................will defer to surgical service at this time.     Continue supportive care  Possible transition to oral therapy to cover anaerobic and gram-negative pathogen at the time of discharge, will defer to ID.     Monitor fever curve     Monitor leukocytosis.        Disposition/additional care plan/treatment interventions: 7/11/2024        Recommendation for anticoagulation once cleared by surgery, appreciate input from Dr. Murdock  New onset atrial fibrillation.     Discussed heparin therapy with general surgery, workup for suspected intraperitoneal abscess with CTA and possible drainage.  Patient with heparin drip postprocedure.  Reported history of CVA in the past.     Continue monitor atrial fibrillation, acceptable ventricular response at this juncture, rate control therapy may be needed.     Monitor electrolytes     Bladder scan to evaluate for urinary retention     Clinically doubt bowel obstruction at this time.  Continue laxatives/stool softeners     Point tenderness on abdominal examination.................Plan CT scan of the abdomen to evaluate for abscess     Continue anaerobic and gram-negative intravenous antibiotic coverage.     Reported history of bradycardia.        Reported COPD did not appear in exacerbation at the time of evaluation.  Continue longitudinal therapy, beta agonist.     Continue monitor ventricular rate............... however reported history of bradycardia.     Last  TSH 3.52 years ago, will check TSH      Disposition/additional care plan/treatment interventions: 7/12/2024        Status post Ultrasound-guided drainageof abdominal abscess.     Continue antimicrobial stewardship as per ID, de-escalation/oral  therapy timing as per ID.     Reported history of bradycardia with suspicion of sick sinus syndrome continue to monitor of rate control therapy     Agree with increasing losartan to optimize blood pressure control.     Transition from heparin infusion  to NOAC.  No Plavix and discharge as recommended by cardiology.    Disposition/additional care plan/treatment interventions: 7/13/2024      Transition recommendation on discharge concerning antibiotics reviewed, appreciate input from ID ..............Omnicef and Flagyl orally and discharged to complete 10-day course.    Will discuss further with surgical service concerning timing of discharge.    With advance diet escalate insulin therapy to optimize blood glucose control .    Blood pressure appeared improved today we will monitor closely    Patient refused option of visiting nurses.  Care plan discussed with surgical service recommendation for continued monitoring inpatient at this time to optimize patient's clinical disposition monitoring white blood cell count closely, estimated discharge likely in the next 48 hours    Voiding trial    Flomax will be added    Escalate stool softeners to optimize constipation disposition.    Nonacute abdomen    Monitor white blood cell count particularly given recent abscess risks of recurrence and possible need for repeat IR intervention closely inpatient.     The patient was informed of differential diagnosis , work up , plan of care and possible sequelae of clinical disposition.Patient in agreement with plan of care. Further recommendations forthcoming in accordance with patient's clinical disposition and response to care.     Discharge planning:  Anticipate likely discharge in the next 48 hours.Care plan discussed with surgical service recommendation for further inpatient monitoring and care.Expected plan discharge today was held patient did not appear clinically safe for this patient with current ongoing disposition .Monitor  white blood cell count particularly given recent abscess risks of recurrence and possible need for repeat IR intervention .    Care time: > 55  mins              Dictation performed with assistance of voice recognition device therefore transcription errors are possible.

## 2024-07-14 VITALS
BODY MASS INDEX: 28.46 KG/M2 | SYSTOLIC BLOOD PRESSURE: 148 MMHG | OXYGEN SATURATION: 98 % | TEMPERATURE: 96.7 F | HEART RATE: 67 BPM | DIASTOLIC BLOOD PRESSURE: 80 MMHG | WEIGHT: 210.1 LBS | RESPIRATION RATE: 17 BRPM | HEIGHT: 72 IN

## 2024-07-14 LAB
ANION GAP SERPL CALC-SCNC: 11 MMOL/L (ref 10–20)
BACTERIA FLD CULT: NORMAL
BUN SERPL-MCNC: 19 MG/DL (ref 6–23)
CALCIUM SERPL-MCNC: 8.6 MG/DL (ref 8.6–10.3)
CHLORIDE SERPL-SCNC: 107 MMOL/L (ref 98–107)
CO2 SERPL-SCNC: 24 MMOL/L (ref 21–32)
CREAT SERPL-MCNC: 1.11 MG/DL (ref 0.5–1.3)
EGFRCR SERPLBLD CKD-EPI 2021: 72 ML/MIN/1.73M*2
ERYTHROCYTE [DISTWIDTH] IN BLOOD BY AUTOMATED COUNT: 12.3 % (ref 11.5–14.5)
GLUCOSE BLD MANUAL STRIP-MCNC: 178 MG/DL (ref 74–99)
GLUCOSE BLD MANUAL STRIP-MCNC: 210 MG/DL (ref 74–99)
GLUCOSE BLD MANUAL STRIP-MCNC: 247 MG/DL (ref 74–99)
GLUCOSE BLD MANUAL STRIP-MCNC: 288 MG/DL (ref 74–99)
GLUCOSE SERPL-MCNC: 181 MG/DL (ref 74–99)
GRAM STN SPEC: NORMAL
GRAM STN SPEC: NORMAL
HCT VFR BLD AUTO: 43.4 % (ref 41–52)
HGB BLD-MCNC: 15 G/DL (ref 13.5–17.5)
MCH RBC QN AUTO: 32.9 PG (ref 26–34)
MCHC RBC AUTO-ENTMCNC: 34.6 G/DL (ref 32–36)
MCV RBC AUTO: 95 FL (ref 80–100)
NRBC BLD-RTO: 0 /100 WBCS (ref 0–0)
PLATELET # BLD AUTO: 270 X10*3/UL (ref 150–450)
POTASSIUM SERPL-SCNC: 3.8 MMOL/L (ref 3.5–5.3)
RBC # BLD AUTO: 4.56 X10*6/UL (ref 4.5–5.9)
SODIUM SERPL-SCNC: 138 MMOL/L (ref 136–145)
WBC # BLD AUTO: 12.3 X10*3/UL (ref 4.4–11.3)

## 2024-07-14 PROCEDURE — 82947 ASSAY GLUCOSE BLOOD QUANT: CPT

## 2024-07-14 PROCEDURE — 2500000002 HC RX 250 W HCPCS SELF ADMINISTERED DRUGS (ALT 637 FOR MEDICARE OP, ALT 636 FOR OP/ED): Performed by: HOSPITALIST

## 2024-07-14 PROCEDURE — 2500000001 HC RX 250 WO HCPCS SELF ADMINISTERED DRUGS (ALT 637 FOR MEDICARE OP): Performed by: SURGERY

## 2024-07-14 PROCEDURE — 36415 COLL VENOUS BLD VENIPUNCTURE: CPT | Performed by: HOSPITALIST

## 2024-07-14 PROCEDURE — 99233 SBSQ HOSP IP/OBS HIGH 50: CPT | Performed by: HOSPITALIST

## 2024-07-14 PROCEDURE — 2500000001 HC RX 250 WO HCPCS SELF ADMINISTERED DRUGS (ALT 637 FOR MEDICARE OP): Performed by: INTERNAL MEDICINE

## 2024-07-14 PROCEDURE — 2500000001 HC RX 250 WO HCPCS SELF ADMINISTERED DRUGS (ALT 637 FOR MEDICARE OP): Performed by: HOSPITALIST

## 2024-07-14 PROCEDURE — 2500000002 HC RX 250 W HCPCS SELF ADMINISTERED DRUGS (ALT 637 FOR MEDICARE OP, ALT 636 FOR OP/ED): Performed by: SURGERY

## 2024-07-14 PROCEDURE — 85027 COMPLETE CBC AUTOMATED: CPT | Performed by: HOSPITALIST

## 2024-07-14 PROCEDURE — 2500000002 HC RX 250 W HCPCS SELF ADMINISTERED DRUGS (ALT 637 FOR MEDICARE OP, ALT 636 FOR OP/ED): Performed by: PHYSICIAN ASSISTANT

## 2024-07-14 PROCEDURE — 2500000004 HC RX 250 GENERAL PHARMACY W/ HCPCS (ALT 636 FOR OP/ED): Performed by: HOSPITALIST

## 2024-07-14 PROCEDURE — 2060000001 HC INTERMEDIATE ICU ROOM DAILY

## 2024-07-14 PROCEDURE — 99232 SBSQ HOSP IP/OBS MODERATE 35: CPT | Performed by: SURGERY

## 2024-07-14 PROCEDURE — 2500000001 HC RX 250 WO HCPCS SELF ADMINISTERED DRUGS (ALT 637 FOR MEDICARE OP): Performed by: NURSE PRACTITIONER

## 2024-07-14 PROCEDURE — 80048 BASIC METABOLIC PNL TOTAL CA: CPT | Performed by: HOSPITALIST

## 2024-07-14 PROCEDURE — 2500000004 HC RX 250 GENERAL PHARMACY W/ HCPCS (ALT 636 FOR OP/ED): Performed by: INTERNAL MEDICINE

## 2024-07-14 RX ORDER — AMLODIPINE BESYLATE 5 MG/1
5 TABLET ORAL DAILY
Status: DISCONTINUED | OUTPATIENT
Start: 2024-07-14 | End: 2024-07-14

## 2024-07-14 RX ADMIN — INSULIN HUMAN 25 UNITS: 100 INJECTION, SUSPENSION SUBCUTANEOUS at 21:58

## 2024-07-14 RX ADMIN — SENNOSIDES 8.6 MG: 8.6 TABLET, FILM COATED ORAL at 09:25

## 2024-07-14 RX ADMIN — APIXABAN 5 MG: 5 TABLET, FILM COATED ORAL at 21:58

## 2024-07-14 RX ADMIN — CEFTRIAXONE SODIUM 2 G: 2 INJECTION, SOLUTION INTRAVENOUS at 06:02

## 2024-07-14 RX ADMIN — INSULIN LISPRO 2 UNITS: 100 INJECTION, SOLUTION INTRAVENOUS; SUBCUTANEOUS at 09:26

## 2024-07-14 RX ADMIN — DOCUSATE SODIUM 100 MG: 100 CAPSULE, LIQUID FILLED ORAL at 21:58

## 2024-07-14 RX ADMIN — METRONIDAZOLE 500 MG: 500 INJECTION, SOLUTION INTRAVENOUS at 16:29

## 2024-07-14 RX ADMIN — LOSARTAN POTASSIUM 100 MG: 50 TABLET, FILM COATED ORAL at 21:58

## 2024-07-14 RX ADMIN — POLYETHYLENE GLYCOL 3350 17 G: 17 POWDER, FOR SOLUTION ORAL at 21:58

## 2024-07-14 RX ADMIN — SENNOSIDES 8.6 MG: 8.6 TABLET, FILM COATED ORAL at 21:58

## 2024-07-14 RX ADMIN — DOCUSATE SODIUM 100 MG: 100 CAPSULE, LIQUID FILLED ORAL at 09:26

## 2024-07-14 RX ADMIN — ISOSORBIDE MONONITRATE 30 MG: 30 TABLET, EXTENDED RELEASE ORAL at 09:25

## 2024-07-14 RX ADMIN — GUAIFENESIN 600 MG: 600 TABLET ORAL at 09:25

## 2024-07-14 RX ADMIN — EZETIMIBE 10 MG: 10 TABLET ORAL at 09:25

## 2024-07-14 RX ADMIN — ASPIRIN 81 MG CHEWABLE TABLET 81 MG: 81 TABLET CHEWABLE at 09:25

## 2024-07-14 RX ADMIN — TAMSULOSIN HYDROCHLORIDE 0.4 MG: 0.4 CAPSULE ORAL at 09:25

## 2024-07-14 RX ADMIN — METRONIDAZOLE 500 MG: 500 INJECTION, SOLUTION INTRAVENOUS at 23:09

## 2024-07-14 RX ADMIN — INSULIN HUMAN 25 UNITS: 100 INJECTION, SUSPENSION SUBCUTANEOUS at 09:41

## 2024-07-14 RX ADMIN — METRONIDAZOLE 500 MG: 500 INJECTION, SOLUTION INTRAVENOUS at 06:47

## 2024-07-14 RX ADMIN — CLOPIDOGREL 75 MG: 75 TABLET ORAL at 09:25

## 2024-07-14 RX ADMIN — APIXABAN 5 MG: 5 TABLET, FILM COATED ORAL at 09:25

## 2024-07-14 ASSESSMENT — COGNITIVE AND FUNCTIONAL STATUS - GENERAL
TOILETING: A LITTLE
MOVING TO AND FROM BED TO CHAIR: A LITTLE
DAILY ACTIVITIY SCORE: 22
STANDING UP FROM CHAIR USING ARMS: A LITTLE
DRESSING REGULAR LOWER BODY CLOTHING: A LITTLE
CLIMB 3 TO 5 STEPS WITH RAILING: A LITTLE
MOBILITY SCORE: 20
WALKING IN HOSPITAL ROOM: A LITTLE

## 2024-07-14 ASSESSMENT — ENCOUNTER SYMPTOMS
VOMITING: 0
DIZZINESS: 0
SHORTNESS OF BREATH: 0
NAUSEA: 0
CONSTIPATION: 1
ABDOMINAL PAIN: 1
BLOOD IN STOOL: 0
HEADACHES: 0
CHILLS: 0
COUGH: 0
DIARRHEA: 0
FEVER: 0
PALPITATIONS: 0

## 2024-07-14 ASSESSMENT — PAIN SCALES - GENERAL
PAINLEVEL_OUTOF10: 0 - NO PAIN

## 2024-07-14 ASSESSMENT — PAIN - FUNCTIONAL ASSESSMENT: PAIN_FUNCTIONAL_ASSESSMENT: 0-10

## 2024-07-14 NOTE — PROGRESS NOTES
Jose Olivia [id number]   Service: Internal Medicine / Hospitalist Date of service: 7/14/2024                                   Full Code         Overnight Events: No new overnight events reported by patient or nursing.      Subjective        History Of Present Illness  Jose Olivia is a 68 y.o. male with past medical history of HTN, CAD, mild COPD, DM with diabetic peripheral neuropathy, hyperlipidemia, mild renal insufficiency and CVA 2018 (on aspirin and Plavix since that time) comes to the hospital with abdominal pain.  The patient states that his pain began yesterday morning while he was working on his water heater.  According the patient the pain moved from the middle of the abdomen more to the right.  He reported some nausea and vomiting.  He also reported some diarrhea.  He reported some chills and sweats but denies any chest pain or shortness of breath.  Initially the patient wanted to leave AMA to finish his water heater work but then later decided to stay for surgical intervention.  At this time the patient states his abdominal pain is improved and he is awaiting to go to the OR for surgery later today.         7/8.................No reported: Chest pains, dyspnea, orthopnea, palpitations, fevers or chills.Patient reported that overall he was doing well given recent surgery.  He endorsed some abdominal pain related to recent surgery.     7/9..............The patient reported feeling better today no reported : nausea, vomiting, chest pains or palpitations.The patient felt that his postop related abdominal pain was tolerable.        7/10................No reported: Palpitations, headaches, chest pains, fevers, chills, nausea or vomiting.  Patient endorsed constipation.     7/11.......................  No reported: Syncope, presyncope, chest pains, nausea, vomiting, palpitations or tremors.However patient did endorse wheezing and shortness of breath this morning.     7/12................  No reported:  Nausea, vomiting, headaches, chest pains, palpitations, fevers or chills.   Patient endorsed 2 bowel movements, no abdominal pain reported at time of evaluation.     7/13..................No reported  :palpitations, syncope, presyncope, nausea, vomiting, fevers or chills.Patient endorsed constipation today.  Recent urinary retention prompted placement of Mcintosh catheter.     7/14....................No reported: Headaches, flank pain, back pain, suprapubic pain, fevers, chills, nausea, vomiting or dyspnea     Review of Systems:   Review of system otherwise negative if not aforementioned above in subjective.     Objective        Physical Exam      Constitutional:       Appearance: Patient appeared in no acute cardiopulmonary distress.     Comments: Patient alert and oriented to person place time and situation.  HEENT:      Head: Normocephalic and atraumatic.Trachea midline      Nose:No observed congestion or rhinorrhea.     Mouth/Throat: Mucous membranes Moist, Trachea appeared  midline.  Eyes:      Extraocular Movements: Extraocular movements intact.      Pupils: Pupils are equal, round, and reactive to light.      Comments: No scleral icterus or conjunctival injection appreciated.   Cardiovascular:      Rate and Rhythm: Irregular rate and rhythm.No peripheral stigmata of endocarditis appreciated.     Pulmonary:      Lungs appeared clear to auscultation, no adventitious sound appreciated.  Abdominal:      General: Status post surgical intervention, abdomen nondistended, hypoactive bowel sounds, no involuntary guarding or rebound tenderness appreciated.     Comments: Point tenderness on palpation.  Musculoskeletal:       Patient appeared to have full active range of motion for upper and lower extremities, no acute apparent joint deformity appreciated on examination.   No pitting edema or cyanosis appreciated.       Lymphadenopathy:      No appreciable palpable lymphadenopathy  Skin:     General: Skin is warm.       Coloration:  No jaundice     Findings: No Pandey Banks's or Winslow signs appreciated.  No appreciation of erythema or signs of infectious process related to surgical site/Laparoscopic incisions,dressing removed today..  Neurological:      General: No focal sensory or motor deficits appreciated, no meningeal signs or dysmetria noted.      Cranial Nerves: Cranial nerves II to XII appearing grossly intact.     Genitals:  Deferred  Psychiatric:         The patient appears to be displaying normal mood and affect at the time of evaluation.     Labs:                 Lab Results   Component Value Date    GLUCOSE 181 (H) 07/14/2024    CALCIUM 8.6 07/14/2024     07/14/2024    K 3.8 07/14/2024    CO2 24 07/14/2024     07/14/2024    BUN 19 07/14/2024    CREATININE 1.11 07/14/2024      Lab Results   Component Value Date    WBC 12.3 (H) 07/14/2024    HGB 15.0 07/14/2024    HCT 43.4 07/14/2024    MCV 95 07/14/2024     07/14/2024            [unfilled]   [unfilled]   No results found for the last 90 days.                  X-rays/ Images     [unfilled]   Radiology Results (last 21 days)     Procedure Component Value Units Date/Time   CT abdomen pelvis w IV contrast [[id number]] Collected: 07/07/24 0400   Order Status: Completed Updated: 07/07/24 0400   Narrative:     Interpreted By:  Corina Farmer,  STUDY:  CT ABDOMEN PELVIS W IV CONTRAST;  7/7/2024 3:45 am      INDICATION:  Signs/Symptoms:RLQ tenderness guarding.      COMPARISON:  CT scan of the abdomen pelvis 09/19/2011      ACCESSION NUMBER(S):  [id number]      ORDERING CLINICIAN:  LIVAN FLORES      TECHNIQUE:  Axial CT images of the abdomen and pelvis with coronal and sagittal  reconstructed images obtained after intravenous administration of 75  mL of Omnipaque 350      FINDINGS:  LOWER CHEST: Peripheral reticular opacities in the lung bases likely  relate to chronic fibrotic changes. Calcified granulomas noted right  lung base. Aortic root, mitral  annular and coronary artery  calcifications noted.      ABDOMEN:      LIVER: Morphology. Hepatic steatosis.  BILE DUCTS: Normal caliber.  GALLBLADDER: No calcified gallstones. No wall thickening.  PANCREAS: Mild fatty atrophy of the pancreas.  SPLEEN: Within normal limits.  Accessory splenule noted.  ADRENALS: Within normal limits.  KIDNEYS and URETERS: Symmetric renal enhancement. No hydronephrosis  or hydroureter. There is a 3 mm calculus in the lower pole of the  right kidney. Subcentimeter hypodense foci bilaterally are too small  to characterize. Bilateral perinephric stranding is nonspecific and  could be age related.      VESSELS:  Calcific atherosclerosis of the aortoiliac and mesenteric  vessels. No aortic aneurysm. RETROPERITONEUM: No pathologically  enlarged retroperitoneal lymph nodes.      PELVIS:      REPRODUCTIVE ORGANS: Prostate gland is enlarged protruding into the  base of the bladder. BLADDER: Bladder is moderately distended with  mild wall thickening.      BOWEL: No dilated bowel. Appendix is dilated measuring up to 12 mm.  There is an appendicolith of the base of the appendix. There is wall  thickening and surrounding inflammatory changes consistent with acute  appendicitis. No evidence for microperforation or abscess formation  at this time. Mild bowel wall thickening involving several loops of  small bowel in the right lower quadrant, likely reactive no  pneumatosis or portal venous gas. A few scattered colonic diverticula  without evidence for acute diverticulitis. PERITONEUM: No ascites or  free air, no fluid collection.      ABDOMINAL WALL: Bilateral inguinal hernias containing fat. Small  umbilical hernia contains fat. BONES: Multilevel degenerative changes  of the spine.       Impression:     Dilated appendix measuring 12 mm with periappendiceal inflammatory  changes and wall thickening consistent with acute appendicitis. No  evidence for microperforation or abscess formation.  Surgical  consultation is advised.      Mild bowel wall thickening involving several loops of bowel in the  right lower quadrant, likely reactive. Diverticulosis without  evidence for acute diverticulitis.      Prostatomegaly with mild bladder wall thickening which may relate to  under distention or sequela of chronic outlet obstruction. Correlate  with urinalysis to exclude infectious cystitis.      Nonobstructing right renal calculus.      Additional findings as described above.      MACRO:  None      Signed by: Corina Farmer 7/7/2024 3:59 AM  Dictation workstation:   [id number]                  Medical Problems         Problem List         * (Principal) Acute appendicitis with localized peritonitis, without perforation, abscess, or gangrene     Abnormal EKG     Abnormal stress test     Acute carpal tunnel syndrome, right     Acute sinusitis     Allergic rhinitis     Benign essential hypertension     Bilateral tinnitus     CAD (coronary artery disease)     Cervical vertigo     Chronic cough     Chronic glossitis     Claudication, intermittent (CMS-HCC)     Contact with and (suspected) exposure to covid-19     COPD, mild (Multi)     Mild chronic obstructive pulmonary disease (Multi)     Diabetes mellitus (Multi)     Diabetic peripheral neuropathy (Multi)     Disease suspected     Dizziness     Dry skin     Elevated serum creatinine     Fatigue     Hemiplegia (Multi)     History of elevated lipids     History of hypertension     HLD (hyperlipidemia)     Hyperkalemia     Hypogonadism in male     Imbalance     Insomnia     Left hemiparesis (Multi)     Low testosterone in male     Mild renal insufficiency     Nasal valve blockage     Numbness     Obstructive sleep apnea syndrome     Onychomycosis     Spondylosis of cervical spine     Osteoarthritis of spine with radiculopathy, cervical region     Left knee pain     Pain and swelling of lower leg, left     Pain of foot     Right hand paresthesia     Right hand  weakness     Shortness of breath     Stroke (Multi)     Tinea corporis     Ulnar neuropathy at elbow, right                  Above medical problems may be reflective of historical medical problems that may have resolved and may not related to acute clinical condition/medical problems.     Clinical impression/plan:     Principal Problem:    Acute appendicitis with localized peritonitis, without perforation, abscess, or gangrene        Acute appendicitis, complicated by localized peritonitis status post appendectomy   -Finding on CAT scan concerning for acute appendicitis.  Surgery has been consulted plan 4 OR today.  -Leukocytosis secondary appendicitis.  Continue Zosyn.         Atrial fibrillation     Atrial fibrillation in the postoperative setting, anticoagulation recommended by cardiology  HELLEN with underlying CKD  -Creatinine 1.67 on admission.  Continue IV fluid hydration  -Avoid nephrotoxins check renal functions in AM.     Diabetes mellitus  -Continue sliding scale insulin last A1c is from 2022 shows A1c of 6.5  -Recheck A1c     Coronary artery disease  -Continue with aspirin and Plavix postoperatively once okay with surgery.     Hypertension  -Holding losartan in setting of HELLEN resume once renal functions improved.     COPD  -Currently stable         Disposition/additional care plan/treatment interventions: 7/14/2024    Transition from heparin infusion  to NOAC.  No Plavix on discharge as recommended by cardiology.    Status post Ultrasound-guided drainageof abdominal abscess............. leukocytosis persists, peritonitis history , plan repeat CT scan of the abdomen in next 24 hours to evaluate for abscess, before  possible discharge and transition to oral antibiotics.    Monitor white blood cell count particularly given recent abscess risks of recurrence and possible need for repeat IR intervention closely inpatient.    Repeat blood pressure post a.m. antihypertensive therapy if hypertension persists  additional antihypertensive.    Continue anaerobic and gram-negative intravenous antibiotic coverage.    Voiding trial     Flomax will be added    Recent constipation, patient reporting loose stools at this time......... de-escalate stool softeners.    Reported history of bradycardia with suspicion of sick sinus syndrome continue to monitor of rate control therapy...................outpatient follow-up with cardiology.    Transition recommendation on discharge concerning antibiotics reviewed, appreciate input from ID ..............Omnicef and Flagyl orally and discharged to complete 10-day course.      Recommend repeat CT scan of the abdomen pelvis 7/15/2024    Home health care recommended however patient refused.     Discharge planning:  Anticipate likely discharge in the next  24-48 hours.Dependent and repeat CT scan of the abdomen for evaluation of abscess,CT scan of abdomen and pelvis 7/15/2024.Care plan discussed with surgical service recommendation for further inpatient monitoring and care.     Monitor white blood cell count particularly given recent abscess risks of recurrence and possible need for repeat IR intervention.Plan for repeat CT scan in the next 24 hours for completeness.     Care time: > 55  mins              Dictation performed with assistance of voice recognition device therefore transcription errors are possible

## 2024-07-14 NOTE — PROGRESS NOTES
Jose Olivia is a 68 y.o. male on day 7 of admission presenting with Acute appendicitis with localized peritonitis, without perforation, abscess, or gangrene.    Subjective   Pt notes he feels better after aspiration, still with a bit of tenderness over the site.  No bm yet       Objective     Physical Examawake, alert, nad  Rrr no murmur  Clear  except for a small left basilar wheeze  Abd is soft with mild tenderness, improved, over aspiration site    Last Recorded Vitals  Blood pressure 144/77, pulse 92, temperature 35.7 °C (96.3 °F), temperature source Temporal, resp. rate 17, height 1.829 m (6'), weight 95.3 kg (210 lb 1.6 oz), SpO2 96%.  Intake/Output last 3 Shifts:  I/O last 3 completed shifts:  In: 1189.2 (12.9 mL/kg) [P.O.:120; I.V.:269.2 (2.9 mL/kg); IV Piggyback:800]  Out: 3175 (34.5 mL/kg) [Urine:3175 (1 mL/kg/hr)]  Weight: 92.1 kg     Relevant Results           This patient currently has cardiac telemetry ordered; if you would like to modify or discontinue the telemetry order, click here to go to the orders activity to modify/discontinue the order.    This patient has a urinary catheter   Reason for the urinary catheter remaining today? urinary retention/bladder outlet obstruction, acute or chronic    Results for orders placed or performed during the hospital encounter of 07/07/24 (from the past 96 hour(s))   POCT GLUCOSE   Result Value Ref Range    POCT Glucose 181 (H) 74 - 99 mg/dL   POCT GLUCOSE   Result Value Ref Range    POCT Glucose 233 (H) 74 - 99 mg/dL   POCT GLUCOSE   Result Value Ref Range    POCT Glucose 142 (H) 74 - 99 mg/dL   POCT GLUCOSE   Result Value Ref Range    POCT Glucose 242 (H) 74 - 99 mg/dL   Electrocardiogram, 12-lead PRN ACS symptoms   Result Value Ref Range    Ventricular Rate 70 BPM    Atrial Rate 70 BPM    QRS Duration 90 ms    QT Interval 410 ms    QTC Calculation(Bazett) 442 ms    R Axis -48 degrees    T Axis 15 degrees    QRS Count 11 beats    Q Onset 211 ms    T Offset  416 ms    QTC Fredericia 431 ms   Basic Metabolic Panel   Result Value Ref Range    Glucose 179 (H) 74 - 99 mg/dL    Sodium 135 (L) 136 - 145 mmol/L    Potassium 4.4 3.5 - 5.3 mmol/L    Chloride 110 (H) 98 - 107 mmol/L    Bicarbonate 14 (L) 21 - 32 mmol/L    Anion Gap 15 10 - 20 mmol/L    Urea Nitrogen 23 6 - 23 mg/dL    Creatinine 0.96 0.50 - 1.30 mg/dL    eGFR 86 >60 mL/min/1.73m*2    Calcium 7.2 (L) 8.6 - 10.3 mg/dL   CBC   Result Value Ref Range    WBC 12.5 (H) 4.4 - 11.3 x10*3/uL    nRBC 0.0 0.0 - 0.0 /100 WBCs    RBC 4.57 4.50 - 5.90 x10*6/uL    Hemoglobin 15.2 13.5 - 17.5 g/dL    Hematocrit 43.7 41.0 - 52.0 %    MCV 96 80 - 100 fL    MCH 33.3 26.0 - 34.0 pg    MCHC 34.8 32.0 - 36.0 g/dL    RDW 12.5 11.5 - 14.5 %    Platelets 206 150 - 450 x10*3/uL   POCT GLUCOSE   Result Value Ref Range    POCT Glucose 190 (H) 74 - 99 mg/dL   Lactate   Result Value Ref Range    Lactate 0.8 0.4 - 2.0 mmol/L   POCT GLUCOSE   Result Value Ref Range    POCT Glucose 159 (H) 74 - 99 mg/dL   Urinalysis with Reflex Culture and Microscopic   Result Value Ref Range    Color, Urine Yellow Light-Yellow, Yellow, Dark-Yellow    Appearance, Urine Clear Clear    Specific Gravity, Urine 1.020 1.005 - 1.035    pH, Urine 6.0 5.0, 5.5, 6.0, 6.5, 7.0, 7.5, 8.0    Protein, Urine 100 (2+) (A) NEGATIVE, 10 (TRACE), 20 (TRACE) mg/dL    Glucose, Urine 300 (3+) (A) Normal mg/dL    Blood, Urine NEGATIVE NEGATIVE    Ketones, Urine 40 (2+) (A) NEGATIVE mg/dL    Bilirubin, Urine NEGATIVE NEGATIVE    Urobilinogen, Urine Normal Normal mg/dL    Nitrite, Urine NEGATIVE NEGATIVE    Leukocyte Esterase, Urine NEGATIVE NEGATIVE   Extra Urine Gray Tube   Result Value Ref Range    Extra Tube Hold for add-ons.    Urine culture    Specimen: Indwelling (Mcintosh) Catheter; Urine   Result Value Ref Range    Urine Culture No growth    Urinalysis Microscopic   Result Value Ref Range    WBC, Urine 1-5 1-5, NONE /HPF    RBC, Urine 1-2 NONE, 1-2, 3-5 /HPF    Budding Yeast,  Urine PRESENT (A) NONE /HPF    Mucus, Urine FEW Reference range not established. /LPF    Hyaline Casts, Urine OCCASIONAL (A) NONE /LPF   Transthoracic Echo (TTE) Complete   Result Value Ref Range    LVOT diam 2.30 cm    LV Biplane EF 67 %    Tricuspid annular plane systolic excursion 2.7 cm    AV mn grad 5.0 mmHg    LA vol index A/L 20.7 ml/m2    AV pk efrain 1.63 m/s    LV EF 63 %    RV free wall pk S' 18.60 cm/s    RVSP 26.8 mmHg    LVIDd 4.50 cm    Aortic Valve Area by Continuity of VTI 2.47 cm2    Aortic Valve Area by Continuity of Peak Velocity 2.43 cm2    AV pk grad 10.6 mmHg    LV A4C EF 64.3     BSA 2.19 m2   Sterile Fluid Culture/Smear    Specimen: Intra-abdominal Abscess; Fluid   Result Value Ref Range    Sterile Fluid Culture/Smear No growth to date     Gram Stain (4+) Abundant Polymorphonuclear leukocytes     Gram Stain No organisms seen    POCT GLUCOSE   Result Value Ref Range    POCT Glucose 262 (H) 74 - 99 mg/dL   Protime-INR   Result Value Ref Range    Protime 13.2 (H) 9.8 - 12.8 seconds    INR 1.2 (H) 0.9 - 1.1   aPTT - baseline   Result Value Ref Range    aPTT 28 27 - 38 seconds   TSH   Result Value Ref Range    Thyroid Stimulating Hormone 4.04 (H) 0.44 - 3.98 mIU/L   POCT GLUCOSE   Result Value Ref Range    POCT Glucose 208 (H) 74 - 99 mg/dL   Heparin Assay, UFH   Result Value Ref Range    Heparin Unfractionated 0.5 See Comment Below for Therapeutic Ranges IU/mL   Heparin Assay, UFH   Result Value Ref Range    Heparin Unfractionated 0.3 See Comment Below for Therapeutic Ranges IU/mL   Basic Metabolic Panel   Result Value Ref Range    Glucose 188 (H) 74 - 99 mg/dL    Sodium 135 (L) 136 - 145 mmol/L    Potassium 3.8 3.5 - 5.3 mmol/L    Chloride 105 98 - 107 mmol/L    Bicarbonate 20 (L) 21 - 32 mmol/L    Anion Gap 14 10 - 20 mmol/L    Urea Nitrogen 21 6 - 23 mg/dL    Creatinine 1.05 0.50 - 1.30 mg/dL    eGFR 77 >60 mL/min/1.73m*2    Calcium 8.5 (L) 8.6 - 10.3 mg/dL   CBC   Result Value Ref Range    WBC  11.8 (H) 4.4 - 11.3 x10*3/uL    nRBC 0.0 0.0 - 0.0 /100 WBCs    RBC 4.48 (L) 4.50 - 5.90 x10*6/uL    Hemoglobin 14.9 13.5 - 17.5 g/dL    Hematocrit 42.3 41.0 - 52.0 %    MCV 94 80 - 100 fL    MCH 33.3 26.0 - 34.0 pg    MCHC 35.2 32.0 - 36.0 g/dL    RDW 12.5 11.5 - 14.5 %    Platelets 229 150 - 450 x10*3/uL   Heparin Assay, UFH   Result Value Ref Range    Heparin Unfractionated 0.3 See Comment Below for Therapeutic Ranges IU/mL   POCT GLUCOSE   Result Value Ref Range    POCT Glucose 193 (H) 74 - 99 mg/dL   POCT GLUCOSE   Result Value Ref Range    POCT Glucose 211 (H) 74 - 99 mg/dL   POCT GLUCOSE   Result Value Ref Range    POCT Glucose 309 (H) 74 - 99 mg/dL   POCT GLUCOSE   Result Value Ref Range    POCT Glucose 292 (H) 74 - 99 mg/dL   CBC   Result Value Ref Range    WBC 12.5 (H) 4.4 - 11.3 x10*3/uL    nRBC 0.0 0.0 - 0.0 /100 WBCs    RBC 4.32 (L) 4.50 - 5.90 x10*6/uL    Hemoglobin 14.5 13.5 - 17.5 g/dL    Hematocrit 40.7 (L) 41.0 - 52.0 %    MCV 94 80 - 100 fL    MCH 33.6 26.0 - 34.0 pg    MCHC 35.6 32.0 - 36.0 g/dL    RDW 12.3 11.5 - 14.5 %    Platelets 245 150 - 450 x10*3/uL   Basic Metabolic Panel   Result Value Ref Range    Glucose 222 (H) 74 - 99 mg/dL    Sodium 135 (L) 136 - 145 mmol/L    Potassium 4.0 3.5 - 5.3 mmol/L    Chloride 106 98 - 107 mmol/L    Bicarbonate 20 (L) 21 - 32 mmol/L    Anion Gap 13 10 - 20 mmol/L    Urea Nitrogen 21 6 - 23 mg/dL    Creatinine 1.01 0.50 - 1.30 mg/dL    eGFR 81 >60 mL/min/1.73m*2    Calcium 8.3 (L) 8.6 - 10.3 mg/dL   POCT GLUCOSE   Result Value Ref Range    POCT Glucose 233 (H) 74 - 99 mg/dL   POCT GLUCOSE   Result Value Ref Range    POCT Glucose 294 (H) 74 - 99 mg/dL   POCT GLUCOSE   Result Value Ref Range    POCT Glucose 269 (H) 74 - 99 mg/dL   POCT GLUCOSE   Result Value Ref Range    POCT Glucose 309 (H) 74 - 99 mg/dL   CBC   Result Value Ref Range    WBC 12.3 (H) 4.4 - 11.3 x10*3/uL    nRBC 0.0 0.0 - 0.0 /100 WBCs    RBC 4.56 4.50 - 5.90 x10*6/uL    Hemoglobin 15.0 13.5 -  17.5 g/dL    Hematocrit 43.4 41.0 - 52.0 %    MCV 95 80 - 100 fL    MCH 32.9 26.0 - 34.0 pg    MCHC 34.6 32.0 - 36.0 g/dL    RDW 12.3 11.5 - 14.5 %    Platelets 270 150 - 450 x10*3/uL                Assessment/Plan   Principal Problem:    Acute appendicitis with localized peritonitis, without perforation, abscess, or gangrene    Stable. Improving, wbc a bit up today, rec observe for at least 1 more day       I spent 20 minutes in the professional and overall care of this patient.      Gilles Hobson MD

## 2024-07-14 NOTE — PROGRESS NOTES
GENERAL SURGERY PROGRESS NOTE    Jose Olivia   1955   [id number]     Jose Olivia is a 68 y.o. male on day 7 of admission presenting with Acute appendicitis with localized peritonitis, without perforation, abscess, or gangrene.      Subjective  No acute events. Patient reports improvement in pain, but not resolved. He is tolerating a diet and having bowel function, but feeling somewhat constipated as well.    Review of Systems   Constitutional:  Negative for chills and fever.   Respiratory:  Negative for cough and shortness of breath.    Cardiovascular:  Negative for chest pain and palpitations.   Gastrointestinal:  Positive for abdominal pain and constipation. Negative for blood in stool, diarrhea, nausea and vomiting.   Neurological:  Negative for dizziness and headaches.   All other systems reviewed and are negative.      Objective    Last Recorded Vitals  Blood pressure 169/81, pulse 63, temperature 35.9 °C (96.6 °F), temperature source Temporal, resp. rate 18, height 1.829 m (6'), weight 95.3 kg (210 lb 1.6 oz), SpO2 96%.    Intake/Output last 3 Shifts:  I/O last 3 completed shifts:  In: 689.2 (7.2 mL/kg) [P.O.:120; I.V.:269.2 (2.8 mL/kg); IV Piggyback:300]  Out: 2725 (28.6 mL/kg) [Urine:2725 (0.8 mL/kg/hr)]  Weight: 95.3 kg     Physical Exam  Constitutional:       General: He is not in acute distress.     Appearance: Normal appearance. He is not ill-appearing.   HENT:      Head: Normocephalic and atraumatic.   Cardiovascular:      Rate and Rhythm: Normal rate and regular rhythm.   Pulmonary:      Effort: Pulmonary effort is normal. No respiratory distress.      Breath sounds: Normal breath sounds.   Abdominal:      General: There is no distension.      Palpations: Abdomen is soft.      Tenderness: There is no abdominal tenderness. There is no guarding.      Comments: Incisions healing well with mild serosanguinous drainage from the umbilical incision.    Musculoskeletal:         General: No swelling.    Skin:     General: Skin is warm and dry.   Neurological:      Mental Status: He is alert and oriented to person, place, and time. Mental status is at baseline.   Psychiatric:         Mood and Affect: Mood normal.         Behavior: Behavior normal.       Labs  Results for orders placed or performed during the hospital encounter of 07/07/24 (from the past 24 hour(s))   POCT GLUCOSE   Result Value Ref Range    POCT Glucose 294 (H) 74 - 99 mg/dL   POCT GLUCOSE   Result Value Ref Range    POCT Glucose 269 (H) 74 - 99 mg/dL   POCT GLUCOSE   Result Value Ref Range    POCT Glucose 309 (H) 74 - 99 mg/dL   CBC   Result Value Ref Range    WBC 12.3 (H) 4.4 - 11.3 x10*3/uL    nRBC 0.0 0.0 - 0.0 /100 WBCs    RBC 4.56 4.50 - 5.90 x10*6/uL    Hemoglobin 15.0 13.5 - 17.5 g/dL    Hematocrit 43.4 41.0 - 52.0 %    MCV 95 80 - 100 fL    MCH 32.9 26.0 - 34.0 pg    MCHC 34.6 32.0 - 36.0 g/dL    RDW 12.3 11.5 - 14.5 %    Platelets 270 150 - 450 x10*3/uL   Basic Metabolic Panel   Result Value Ref Range    Glucose 181 (H) 74 - 99 mg/dL    Sodium 138 136 - 145 mmol/L    Potassium 3.8 3.5 - 5.3 mmol/L    Chloride 107 98 - 107 mmol/L    Bicarbonate 24 21 - 32 mmol/L    Anion Gap 11 10 - 20 mmol/L    Urea Nitrogen 19 6 - 23 mg/dL    Creatinine 1.11 0.50 - 1.30 mg/dL    eGFR 72 >60 mL/min/1.73m*2    Calcium 8.6 8.6 - 10.3 mg/dL   POCT GLUCOSE   Result Value Ref Range    POCT Glucose 178 (H) 74 - 99 mg/dL       Radiology    IR body aspiration    Ultrasound-guided aspiration of a superficial right lower quadrant fluid collection as above.   Signed by: Marcial Andujar 7/11/2024 2:57 PM Dictation workstation:   [id number]    CT abdomen pelvis w IV and oral contrast    1.  Status post appendectomy, with several loculated fluid collections in the right lower quadrant, possibly with early abscess formation as there is reticulation of the locoregional mesentery as well as small bowel edema. 2. Fairly marked distention of the urinary bladder, this is felt  to account for mild bilateral hydronephrosis. A punctate nonobstructing right intrarenal calculus is similar to the prior exam. Mild renal cortical atrophy.   MACRO: 1. None   Signed by: True Celestin 7/11/2024 1:03 PM Dictation workstation:   [id number]    CT abdomen pelvis w IV contrast    Dilated appendix measuring 12 mm with periappendiceal inflammatory changes and wall thickening consistent with acute appendicitis. No evidence for microperforation or abscess formation. Surgical consultation is advised.   Mild bowel wall thickening involving several loops of bowel in the right lower quadrant, likely reactive. Diverticulosis without evidence for acute diverticulitis.   Prostatomegaly with mild bladder wall thickening which may relate to under distention or sequela of chronic outlet obstruction. Correlate with urinalysis to exclude infectious cystitis.   Nonobstructing right renal calculus.   Additional findings as described above.   MACRO: None   Signed by: Corina Farmer 7/7/2024 3:59 AM Dictation workstation:   [id number]      Assessment and Plan  Principal Problem:    Acute appendicitis with localized peritonitis, without perforation, abscess, or gangrene    68 y.o. male with perforated appendicitis status post laparoscopic appendectomy and repair of umbilical hernia 7/7/24. He is status post aspiration of a fluid collection 7/11/24 with negative cultures. He is overall clinically improving, but has a persistent leukocytosis. Following a discussion with [facility], they plan to keep him inpatient and we will consider a repeat CT scan in the near future. Diet as tolerated. Continue bowel regimen.    Claudette Noel MD, [facility]  General Surgery

## 2024-07-14 NOTE — CARE PLAN
Pt remained safe and stable throughout shift. Continues to report minimal abdominal tenderness but does not require intervention. Pt reported having loose bowel movement today. Pt reported that he is beginning to feel better. No acute events overnight.      Problem: Pain - Adult  Goal: Verbalizes/displays adequate comfort level or baseline comfort level  Outcome: Progressing     Problem: Safety - Adult  Goal: Free from fall injury  Outcome: Progressing     Problem: Discharge Planning  Goal: Discharge to home or other facility with appropriate resources  Outcome: Progressing     Problem: Chronic Conditions and Co-morbidities  Goal: Patient's chronic conditions and co-morbidity symptoms are monitored and maintained or improved  Outcome: Progressing     Problem: Diabetes  Goal: Achieve decreasing blood glucose levels by end of shift  Outcome: Progressing  Goal: Increase stability of blood glucose readings by end of shift  Outcome: Progressing  Goal: Decrease in ketones present in urine by end of shift  Outcome: Progressing  Goal: Maintain electrolyte levels within acceptable range throughout shift  Outcome: Progressing  Goal: Maintain glucose levels >70mg/dl to <250mg/dl throughout shift  Outcome: Progressing  Goal: No changes in neurological exam by end of shift  Outcome: Progressing  Goal: Learn about and adhere to nutrition recommendations by end of shift  Outcome: Progressing  Goal: Vital signs within normal range for age by end of shift  Outcome: Progressing  Goal: Increase self care and/or family involovement by end of shift  Outcome: Progressing  Goal: Receive DSME education by end of shift  Outcome: Progressing     Problem: Pain  Goal: Takes deep breaths with improved pain control throughout the shift  Outcome: Progressing  Goal: Turns in bed with improved pain control throughout the shift  Outcome: Progressing  Goal: Walks with improved pain control throughout the shift  Outcome: Progressing  Goal: Performs  ADL's with improved pain control throughout shift  Outcome: Progressing  Goal: Participates in PT with improved pain control throughout the shift  Outcome: Progressing  Goal: Free from opioid side effects throughout the shift  Outcome: Progressing  Goal: Free from acute confusion related to pain meds throughout the shift  Outcome: Progressing     Problem: Fall/Injury  Goal: Not fall by end of shift  Outcome: Progressing  Goal: Be free from injury by end of the shift  Outcome: Progressing  Goal: Verbalize understanding of personal risk factors for fall in the hospital  Outcome: Progressing  Goal: Verbalize understanding of risk factor reduction measures to prevent injury from fall in the home  Outcome: Progressing  Goal: Use assistive devices by end of the shift  Outcome: Progressing  Goal: Pace activities to prevent fatigue by end of the shift  Outcome: Progressing     Problem: Atrial Fibrillation  Goal: I will have no complications due to atrial fibrillation  Outcome: Progressing

## 2024-07-14 NOTE — CARE PLAN
The patient's goals for the shift include      The clinical goals for the shift include pt will remain safe and HDS throughout shift      Problem: Pain - Adult  Goal: Verbalizes/displays adequate comfort level or baseline comfort level  Outcome: Progressing     Problem: Discharge Planning  Goal: Discharge to home or other facility with appropriate resources  Outcome: Progressing     Problem: Safety - Adult  Goal: Free from fall injury  Outcome: Progressing     Problem: Chronic Conditions and Co-morbidities  Goal: Patient's chronic conditions and co-morbidity symptoms are monitored and maintained or improved  Outcome: Progressing     Problem: Diabetes  Goal: Achieve decreasing blood glucose levels by end of shift  Outcome: Progressing  Goal: Increase stability of blood glucose readings by end of shift  Outcome: Progressing  Goal: Decrease in ketones present in urine by end of shift  Outcome: Progressing  Goal: Maintain electrolyte levels within acceptable range throughout shift  Outcome: Progressing  Goal: Maintain glucose levels >70mg/dl to <250mg/dl throughout shift  Outcome: Progressing  Goal: No changes in neurological exam by end of shift  Outcome: Progressing  Goal: Learn about and adhere to nutrition recommendations by end of shift  Outcome: Progressing  Goal: Vital signs within normal range for age by end of shift  Outcome: Progressing  Goal: Increase self care and/or family involovement by end of shift  Outcome: Progressing  Goal: Receive DSME education by end of shift  Outcome: Progressing     Problem: Fall/Injury  Goal: Not fall by end of shift  Outcome: Progressing  Goal: Be free from injury by end of the shift  Outcome: Progressing  Goal: Verbalize understanding of personal risk factors for fall in the hospital  Outcome: Progressing  Goal: Verbalize understanding of risk factor reduction measures to prevent injury from fall in the home  Outcome: Progressing  Goal: Use assistive devices by end of the  shift  Outcome: Progressing  Goal: Pace activities to prevent fatigue by end of the shift  Outcome: Progressing     Problem: Atrial Fibrillation  Goal: I will have no complications due to atrial fibrillation  Outcome: Progressing

## 2024-07-15 ENCOUNTER — PHARMACY VISIT (OUTPATIENT)
Dept: PHARMACY | Facility: CLINIC | Age: 69
End: 2024-07-15
Payer: COMMERCIAL

## 2024-07-15 ENCOUNTER — APPOINTMENT (OUTPATIENT)
Dept: RADIOLOGY | Facility: HOSPITAL | Age: 69
DRG: 397 | End: 2024-07-15
Payer: MEDICARE

## 2024-07-15 VITALS
OXYGEN SATURATION: 97 % | RESPIRATION RATE: 16 BRPM | SYSTOLIC BLOOD PRESSURE: 144 MMHG | TEMPERATURE: 97.6 F | WEIGHT: 204.59 LBS | DIASTOLIC BLOOD PRESSURE: 72 MMHG | HEIGHT: 72 IN | HEART RATE: 71 BPM | BODY MASS INDEX: 27.71 KG/M2

## 2024-07-15 PROBLEM — K35.30 ACUTE APPENDICITIS WITH LOCALIZED PERITONITIS, WITHOUT PERFORATION, ABSCESS, OR GANGRENE: Status: RESOLVED | Noted: 2024-07-07 | Resolved: 2024-07-15

## 2024-07-15 LAB
ANION GAP SERPL CALC-SCNC: 12 MMOL/L (ref 10–20)
BUN SERPL-MCNC: 21 MG/DL (ref 6–23)
CALCIUM SERPL-MCNC: 8.9 MG/DL (ref 8.6–10.3)
CHLORIDE SERPL-SCNC: 108 MMOL/L (ref 98–107)
CO2 SERPL-SCNC: 22 MMOL/L (ref 21–32)
CREAT SERPL-MCNC: 1.07 MG/DL (ref 0.5–1.3)
EGFRCR SERPLBLD CKD-EPI 2021: 75 ML/MIN/1.73M*2
ERYTHROCYTE [DISTWIDTH] IN BLOOD BY AUTOMATED COUNT: 12.5 % (ref 11.5–14.5)
GLUCOSE BLD MANUAL STRIP-MCNC: 145 MG/DL (ref 74–99)
GLUCOSE BLD MANUAL STRIP-MCNC: 228 MG/DL (ref 74–99)
GLUCOSE BLD MANUAL STRIP-MCNC: 285 MG/DL (ref 74–99)
GLUCOSE SERPL-MCNC: 135 MG/DL (ref 74–99)
HCT VFR BLD AUTO: 40.7 % (ref 41–52)
HGB BLD-MCNC: 14.1 G/DL (ref 13.5–17.5)
MAGNESIUM SERPL-MCNC: 1.61 MG/DL (ref 1.6–2.4)
MCH RBC QN AUTO: 33 PG (ref 26–34)
MCHC RBC AUTO-ENTMCNC: 34.6 G/DL (ref 32–36)
MCV RBC AUTO: 95 FL (ref 80–100)
NRBC BLD-RTO: 0 /100 WBCS (ref 0–0)
PLATELET # BLD AUTO: 294 X10*3/UL (ref 150–450)
POTASSIUM SERPL-SCNC: 3.7 MMOL/L (ref 3.5–5.3)
RBC # BLD AUTO: 4.27 X10*6/UL (ref 4.5–5.9)
SODIUM SERPL-SCNC: 138 MMOL/L (ref 136–145)
WBC # BLD AUTO: 10.6 X10*3/UL (ref 4.4–11.3)

## 2024-07-15 PROCEDURE — 85027 COMPLETE CBC AUTOMATED: CPT | Performed by: SURGERY

## 2024-07-15 PROCEDURE — 99239 HOSP IP/OBS DSCHRG MGMT >30: CPT | Performed by: FAMILY MEDICINE

## 2024-07-15 PROCEDURE — 2500000002 HC RX 250 W HCPCS SELF ADMINISTERED DRUGS (ALT 637 FOR MEDICARE OP, ALT 636 FOR OP/ED): Performed by: SURGERY

## 2024-07-15 PROCEDURE — 74177 CT ABD & PELVIS W/CONTRAST: CPT

## 2024-07-15 PROCEDURE — 2500000001 HC RX 250 WO HCPCS SELF ADMINISTERED DRUGS (ALT 637 FOR MEDICARE OP): Performed by: HOSPITALIST

## 2024-07-15 PROCEDURE — 80048 BASIC METABOLIC PNL TOTAL CA: CPT | Performed by: SURGERY

## 2024-07-15 PROCEDURE — 36415 COLL VENOUS BLD VENIPUNCTURE: CPT | Performed by: SURGERY

## 2024-07-15 PROCEDURE — 2550000001 HC RX 255 CONTRASTS: Performed by: FAMILY MEDICINE

## 2024-07-15 PROCEDURE — RXMED WILLOW AMBULATORY MEDICATION CHARGE

## 2024-07-15 PROCEDURE — 2500000001 HC RX 250 WO HCPCS SELF ADMINISTERED DRUGS (ALT 637 FOR MEDICARE OP): Performed by: SURGERY

## 2024-07-15 PROCEDURE — 2500000002 HC RX 250 W HCPCS SELF ADMINISTERED DRUGS (ALT 637 FOR MEDICARE OP, ALT 636 FOR OP/ED): Performed by: HOSPITALIST

## 2024-07-15 PROCEDURE — 2500000001 HC RX 250 WO HCPCS SELF ADMINISTERED DRUGS (ALT 637 FOR MEDICARE OP): Performed by: NURSE PRACTITIONER

## 2024-07-15 PROCEDURE — 2500000001 HC RX 250 WO HCPCS SELF ADMINISTERED DRUGS (ALT 637 FOR MEDICARE OP): Performed by: INTERNAL MEDICINE

## 2024-07-15 PROCEDURE — 2500000002 HC RX 250 W HCPCS SELF ADMINISTERED DRUGS (ALT 637 FOR MEDICARE OP, ALT 636 FOR OP/ED): Performed by: PHYSICIAN ASSISTANT

## 2024-07-15 PROCEDURE — 83735 ASSAY OF MAGNESIUM: CPT | Performed by: SURGERY

## 2024-07-15 PROCEDURE — 2500000004 HC RX 250 GENERAL PHARMACY W/ HCPCS (ALT 636 FOR OP/ED): Mod: JZ | Performed by: INTERNAL MEDICINE

## 2024-07-15 PROCEDURE — 82947 ASSAY GLUCOSE BLOOD QUANT: CPT

## 2024-07-15 PROCEDURE — 74177 CT ABD & PELVIS W/CONTRAST: CPT | Performed by: RADIOLOGY

## 2024-07-15 RX ORDER — METRONIDAZOLE 500 MG/1
500 TABLET ORAL 2 TIMES DAILY
Qty: 6 TABLET | Refills: 0 | Status: SHIPPED | OUTPATIENT
Start: 2024-07-15 | End: 2024-07-25 | Stop reason: WASHOUT

## 2024-07-15 RX ORDER — TAMSULOSIN HYDROCHLORIDE 0.4 MG/1
0.4 CAPSULE ORAL DAILY
Qty: 30 CAPSULE | Refills: 0 | Status: SHIPPED | OUTPATIENT
Start: 2024-07-16 | End: 2024-08-15

## 2024-07-15 RX ORDER — CEFDINIR 300 MG/1
300 CAPSULE ORAL 2 TIMES DAILY
Qty: 6 CAPSULE | Refills: 0 | Status: SHIPPED | OUTPATIENT
Start: 2024-07-15 | End: 2024-07-25 | Stop reason: WASHOUT

## 2024-07-15 RX ORDER — OXYCODONE HYDROCHLORIDE 5 MG/1
5 TABLET ORAL EVERY 6 HOURS PRN
Qty: 12 TABLET | Refills: 0 | Status: SHIPPED | OUTPATIENT
Start: 2024-07-15 | End: 2024-07-25 | Stop reason: WASHOUT

## 2024-07-15 ASSESSMENT — PAIN - FUNCTIONAL ASSESSMENT
PAIN_FUNCTIONAL_ASSESSMENT: 0-10
PAIN_FUNCTIONAL_ASSESSMENT: 0-10

## 2024-07-15 ASSESSMENT — PAIN SCALES - GENERAL
PAINLEVEL_OUTOF10: 0 - NO PAIN
PAINLEVEL_OUTOF10: 0 - NO PAIN

## 2024-07-15 NOTE — PROGRESS NOTES
[facility]  GENERAL SURGERY - PROGRESS NOTE    Patient Name: Jose Olivia  MRN: [id number]  Admit Date: 707  : 1955  AGE: 68 y.o.   GENDER: male    CHIEF COMPLAINT / EVENTS LAST 24HRS / HPI:  Pain improved to right lower quadrant.  Leukocytosis time from 12.    MEDICAL HISTORY / ROS:   Admission history and ROS reviewed. Pertinent changes as follows:      Review of Systems   Constitutional:   Negative for fever, chills, weight loss.   HENT:  Negative for congestion and dental problem.    Eyes:  Negative for discharge and itching.   Respiratory: . Negative for apnea, choking and wheezing.    Cardiovascular:  Negative for palpitations and leg swelling.   Gastrointestinal:  negative for n/v  Endocrine: Negative for cold intolerance and heat intolerance.   Musculoskeletal:  Negative for neck pain.   Skin:  Negative for color change.   Neurological:  Negative for tingling, loss of consciousness and numbness.   Psychiatric/Behavioral:  Negative for agitation and behavioral problems.      PHYSICAL EXAM:  Heart Rate:  [61-67]   Temp:  [35.9 °C (96.6 °F)-36.3 °C (97.4 °F)]   Resp:  [17-18]   BP: (128-169)/(74-81)   Weight:  [92.8 kg (204 lb 9.4 oz)]   SpO2:  [91 %-98 %]   Physical Exam  Constitutional:       Appearance: Normal appearance.   HENT:      Head: Normocephalic.      Mouth/Throat:      Mouth: Mucous membranes are moist.   Eyes:      Pupils: Pupils are equal, round, and reactive to light.   Cardiovascular:      Rate and Rhythm: Normal rate.   Abdominal:      General: Abdomen is flat.      Comments: Tender to palpation of right lower quadrant no rebound or guarding   Musculoskeletal:         General: Normal range of motion.   Skin:     General: Skin is warm.      Capillary Refill: Capillary refill takes less than 2 seconds.   Neurological:      Mental Status: He is alert.   Psychiatric:         Mood and Affect: Mood normal.           I have reviewed all medications,  laboratory results, and imaging pertinent for today's encounter.    ==============================================================================  TODAY'S ASSESSMENT AND PLAN OF CARE:  Patient is a 68-year-old male who presented with acute appendicitis now status post laparoscopic appendectomy found to have perforated appendicitis IntraOp.  Patient now status post needle aspiration on July 11.  Pain improved today leukocytosis down 10 from 12.  Patient  on exam but improved      Plan:  -No acute surgical intervention  -Regular diet  -P.o. pain meds  -May discharge from surgical standpoint with close follow up      Patient will be discussed with Attending Physician Dr. Jazlyn Chan PGY4  General Surgery Service   ==============================================================================

## 2024-07-15 NOTE — PROGRESS NOTES
[facility] INFECTIOUS DISEASE PROGRESS NOTE    Patient Name: Jose Olivia  MRN: [id number]    INTERVAL HISTORY:   No fevers. WBC are better. Complains of abx pain wendy w coughing    Patient Active Problem List   Diagnosis    Abnormal EKG    Abnormal stress test    Acute carpal tunnel syndrome, right    Acute sinusitis    Allergic rhinitis    Benign essential hypertension    Bilateral tinnitus    CAD (coronary artery disease)    Cervical vertigo    Chronic cough    Chronic glossitis    Claudication, intermittent (CMS-HCC)    Contact with and (suspected) exposure to covid-19    COPD, mild (Multi)    Mild chronic obstructive pulmonary disease (Multi)    Diabetes mellitus (Multi)    Diabetic peripheral neuropathy (Multi)    Disease suspected    Dizziness    Dry skin    Elevated serum creatinine    Fatigue    Hemiplegia (Multi)    History of elevated lipids    History of hypertension    HLD (hyperlipidemia)    Hyperkalemia    Hypogonadism in male    Imbalance    Insomnia    Left hemiparesis (Multi)    Low testosterone in male    Mild renal insufficiency    Nasal valve blockage    Numbness    Obstructive sleep apnea syndrome    Onychomycosis    Spondylosis of cervical spine    Osteoarthritis of spine with radiculopathy, cervical region    Left knee pain    Pain and swelling of lower leg, left    Pain of foot    Right hand paresthesia    Right hand weakness    Shortness of breath    Stroke (Multi)    Tinea corporis    Ulnar neuropathy at elbow, right    Acute appendicitis with localized peritonitis, without perforation, abscess, or gangrene        ASSESSMENT:   Perf Appendicitis  Localized peritonitis  HELLEN on CKD 3  DM2   CAD  COPD  HTN     Plan   Continue Ceftriaxone and Flagyl  Follow cultures  Bcx x 2 w fevers   Serial abd exams  Consider repeating CT scan of the abdomen and pelvis tomorrow  If no abscess detected are significantly improved and If advances with diet then discharge plan will be on oral Omnicef and Flagyl  to complete 10 days of therapy postoperatively.  Repeat imaging may be warranted for worsening symptoms, leukocytosis or adverse abdominal examination    MEDICATIONS: reviewed.    Current Facility-Administered Medications:     acetaminophen (Tylenol) tablet 650 mg, 650 mg, oral, q4h PRN, Woody Chan MD, 650 mg at 07/10/24 1209    albuterol 2.5 mg /3 mL (0.083 %) nebulizer solution 2.5 mg, 2.5 mg, nebulization, q6h PRN, Jaspal Condon DO    apixaban (Eliquis) tablet 5 mg, 5 mg, oral, q12h, Maribel Burgos, APRN-CNP, 5 mg at 07/14/24 2158    aspirin chewable tablet 81 mg, 81 mg, oral, Daily, Gilles Hobson MD, 81 mg at 07/14/24 0925    cefTRIAXone (Rocephin) 2 g in dextrose 5% in water (D5W) 50 mL, 2 g, intravenous, q24h, Hamlet Wolff MD, Stopped at 07/15/24 0646    clopidogrel (Plavix) tablet 75 mg, 75 mg, oral, Daily, Jori Blanco MD, 75 mg at 07/14/24 0925    dextrose 50 % injection 12.5 g, 12.5 g, intravenous, q15 min PRN, Gilles Hobson MD    dextrose 50 % injection 12.5 g, 12.5 g, intravenous, q15 min PRN, Mike Aponte PA-C    dextrose 50 % injection 25 g, 25 g, intravenous, q15 min PRN, Gilles Hobson MD    dextrose 50 % injection 25 g, 25 g, intravenous, q15 min PRN, Mike Aponte PA-C    docusate sodium (Colace) capsule 100 mg, 100 mg, oral, BID, Jaspal Condon DO, 100 mg at 07/14/24 2158    ezetimibe (Zetia) tablet 10 mg, 10 mg, oral, Daily, Gilles Hobson MD, 10 mg at 07/14/24 0925    glucagon (Glucagen) injection 1 mg, 1 mg, intramuscular, q15 min PRN, Gilles Hobson MD    glucagon (Glucagen) injection 1 mg, 1 mg, intramuscular, q15 min PRN, Gilles Hobson MD    glucagon (Glucagen) injection 1 mg, 1 mg, intramuscular, q15 min PRN, Mike Aponte PA-C    glucagon (Glucagen) injection 1 mg, 1 mg, intramuscular, q15 min PRN, Mike Aponte PA-C    guaiFENesin (Mucinex) 12 hr tablet 600 mg, 600 mg, oral, BID PRN, Jaspal Condon DO, 600 mg at 07/14/24 0947    HYDROmorphone PF (Dilaudid)  injection 0.2 mg, 0.2 mg, intravenous, q3h PRN, Riley Reyes, DO, 0.2 mg at 07/11/24 0811    insulin lispro (HumaLOG) injection 0-10 Units, 0-10 Units, subcutaneous, TID, Gilles Hobson MD, 2 Units at 07/14/24 0926    insulin NPH (Isophane) (HumuLIN N,NovoLIN N) injection 25 Units, 25 Units, subcutaneous, q12h JAYRO, Mike Aponte PA-C, 25 Units at 07/14/24 2158    isosorbide mononitrate ER (Imdur) 24 hr tablet 30 mg, 30 mg, oral, Daily, Gilles Hobson MD, 30 mg at 07/14/24 0925    losartan (Cozaar) tablet 100 mg, 100 mg, oral, Daily, Maribel Burgos, APRN-CNP, 100 mg at 07/14/24 2158    metroNIDAZOLE (Flagyl) 500 mg in NaCl (iso-os) 100 mL, 500 mg, intravenous, q8h, Hamlet Wolff MD, Stopped at 07/15/24 0009    ondansetron (Zofran) injection 4 mg, 4 mg, intravenous, q6h PRN, Gilles Hobson MD, 4 mg at 07/11/24 0646    oxyCODONE (Roxicodone) immediate release tablet 10 mg, 10 mg, oral, q4h PRN, Woody Chan MD, 10 mg at 07/10/24 2143    oxyCODONE (Roxicodone) immediate release tablet 5 mg, 5 mg, oral, q4h PRN, Woody Chan MD, 5 mg at 07/13/24 0137    perflutren lipid microspheres (Definity) injection 0.5-10 mL of dilution, 0.5-10 mL of dilution, intravenous, Once in imaging, Diogo Murdock MD    perflutren lipid microspheres (Definity) injection 0.5-10 mL of dilution, 0.5-10 mL of dilution, intravenous, Once in imaging, Diogo Murdock MD    perflutren protein A microsphere (Optison) injection 0.5 mL, 0.5 mL, intravenous, Once in imaging, Diogo Murdock MD    polyethylene glycol (Glycolax, Miralax) packet 17 g, 17 g, oral, BID, Jaspal Condon DO, 17 g at 07/14/24 2158    sennosides (Senokot) tablet 8.6 mg, 1 tablet, oral, BID, Jaspal Condon DO, 8.6 mg at 07/14/24 2158    sulfur hexafluoride microsphr (Lumason) injection 24.28 mg, 2 mL, intravenous, Once in imaging, Diogo Murdock MD    sulfur hexafluoride microsphr (Lumason) injection 24.28 mg, 2 mL, intravenous, Once in imaging, Diogo Murdock MD    tamsulosin  (Flomax) 24 hr capsule 0.4 mg, 0.4 mg, oral, Daily, Jaspal Condon DO, 0.4 mg at 24 0925     PHYSICAL EXAM:  Vital signs: /80 (BP Location: Left arm, Patient Position: Lying)   Pulse 70   Temp 37.1 °C (98.7 °F) (Temporal)   Resp 16   Ht 1.829 m (6')   Wt 92.8 kg (204 lb 9.4 oz)   SpO2 96%   BMI 27.75 kg/m²   Temp (24hrs), Av.3 °C (97.3 °F), Min:35.9 °C (96.7 °F), Max:37.1 °C (98.7 °F)    General: alert, oriented, NAD  Lungs: bilaterally clear to auscultation  Heart: regular rate and rhythm  Abdomen: soft, + tender, non distended, BS+  Extremities: no edema  No rashes  No joint inflammation  Neck supple  Lines ok  No CVAT    Labs:    Results for orders placed or performed during the hospital encounter of 24 (from the past 96 hour(s))   Lactate   Result Value Ref Range    Lactate 0.8 0.4 - 2.0 mmol/L   POCT GLUCOSE   Result Value Ref Range    POCT Glucose 159 (H) 74 - 99 mg/dL   Urinalysis with Reflex Culture and Microscopic   Result Value Ref Range    Color, Urine Yellow Light-Yellow, Yellow, Dark-Yellow    Appearance, Urine Clear Clear    Specific Gravity, Urine 1.020 1.005 - 1.035    pH, Urine 6.0 5.0, 5.5, 6.0, 6.5, 7.0, 7.5, 8.0    Protein, Urine 100 (2+) (A) NEGATIVE, 10 (TRACE), 20 (TRACE) mg/dL    Glucose, Urine 300 (3+) (A) Normal mg/dL    Blood, Urine NEGATIVE NEGATIVE    Ketones, Urine 40 (2+) (A) NEGATIVE mg/dL    Bilirubin, Urine NEGATIVE NEGATIVE    Urobilinogen, Urine Normal Normal mg/dL    Nitrite, Urine NEGATIVE NEGATIVE    Leukocyte Esterase, Urine NEGATIVE NEGATIVE   Extra Urine Gray Tube   Result Value Ref Range    Extra Tube Hold for add-ons.    Urine culture    Specimen: Indwelling (Mcintosh) Catheter; Urine   Result Value Ref Range    Urine Culture No growth    Urinalysis Microscopic   Result Value Ref Range    WBC, Urine 1-5 1-5, NONE /HPF    RBC, Urine 1-2 NONE, 1-2, 3-5 /HPF    Budding Yeast, Urine PRESENT (A) NONE /HPF    Mucus, Urine FEW Reference range not  established. /LPF    Hyaline Casts, Urine OCCASIONAL (A) NONE /LPF   Transthoracic Echo (TTE) Complete   Result Value Ref Range    LVOT diam 2.30 cm    LV Biplane EF 67 %    Tricuspid annular plane systolic excursion 2.7 cm    AV mn grad 5.0 mmHg    LA vol index A/L 20.7 ml/m2    AV pk efrain 1.63 m/s    LV EF 63 %    RV free wall pk S' 18.60 cm/s    RVSP 26.8 mmHg    LVIDd 4.50 cm    Aortic Valve Area by Continuity of VTI 2.47 cm2    Aortic Valve Area by Continuity of Peak Velocity 2.43 cm2    AV pk grad 10.6 mmHg    LV A4C EF 64.3     BSA 2.19 m2   Sterile Fluid Culture/Smear    Specimen: Intra-abdominal Abscess; Fluid   Result Value Ref Range    Sterile Fluid Culture/Smear No growth aerobically and anaerobically     Gram Stain (4+) Abundant Polymorphonuclear leukocytes     Gram Stain No organisms seen    POCT GLUCOSE   Result Value Ref Range    POCT Glucose 262 (H) 74 - 99 mg/dL   Protime-INR   Result Value Ref Range    Protime 13.2 (H) 9.8 - 12.8 seconds    INR 1.2 (H) 0.9 - 1.1   aPTT - baseline   Result Value Ref Range    aPTT 28 27 - 38 seconds   TSH   Result Value Ref Range    Thyroid Stimulating Hormone 4.04 (H) 0.44 - 3.98 mIU/L   POCT GLUCOSE   Result Value Ref Range    POCT Glucose 208 (H) 74 - 99 mg/dL   Heparin Assay, UFH   Result Value Ref Range    Heparin Unfractionated 0.5 See Comment Below for Therapeutic Ranges IU/mL   Heparin Assay, UFH   Result Value Ref Range    Heparin Unfractionated 0.3 See Comment Below for Therapeutic Ranges IU/mL   Basic Metabolic Panel   Result Value Ref Range    Glucose 188 (H) 74 - 99 mg/dL    Sodium 135 (L) 136 - 145 mmol/L    Potassium 3.8 3.5 - 5.3 mmol/L    Chloride 105 98 - 107 mmol/L    Bicarbonate 20 (L) 21 - 32 mmol/L    Anion Gap 14 10 - 20 mmol/L    Urea Nitrogen 21 6 - 23 mg/dL    Creatinine 1.05 0.50 - 1.30 mg/dL    eGFR 77 >60 mL/min/1.73m*2    Calcium 8.5 (L) 8.6 - 10.3 mg/dL   CBC   Result Value Ref Range    WBC 11.8 (H) 4.4 - 11.3 x10*3/uL    nRBC 0.0 0.0 -  0.0 /100 WBCs    RBC 4.48 (L) 4.50 - 5.90 x10*6/uL    Hemoglobin 14.9 13.5 - 17.5 g/dL    Hematocrit 42.3 41.0 - 52.0 %    MCV 94 80 - 100 fL    MCH 33.3 26.0 - 34.0 pg    MCHC 35.2 32.0 - 36.0 g/dL    RDW 12.5 11.5 - 14.5 %    Platelets 229 150 - 450 x10*3/uL   Heparin Assay, UFH   Result Value Ref Range    Heparin Unfractionated 0.3 See Comment Below for Therapeutic Ranges IU/mL   POCT GLUCOSE   Result Value Ref Range    POCT Glucose 193 (H) 74 - 99 mg/dL   POCT GLUCOSE   Result Value Ref Range    POCT Glucose 211 (H) 74 - 99 mg/dL   POCT GLUCOSE   Result Value Ref Range    POCT Glucose 309 (H) 74 - 99 mg/dL   POCT GLUCOSE   Result Value Ref Range    POCT Glucose 292 (H) 74 - 99 mg/dL   CBC   Result Value Ref Range    WBC 12.5 (H) 4.4 - 11.3 x10*3/uL    nRBC 0.0 0.0 - 0.0 /100 WBCs    RBC 4.32 (L) 4.50 - 5.90 x10*6/uL    Hemoglobin 14.5 13.5 - 17.5 g/dL    Hematocrit 40.7 (L) 41.0 - 52.0 %    MCV 94 80 - 100 fL    MCH 33.6 26.0 - 34.0 pg    MCHC 35.6 32.0 - 36.0 g/dL    RDW 12.3 11.5 - 14.5 %    Platelets 245 150 - 450 x10*3/uL   Basic Metabolic Panel   Result Value Ref Range    Glucose 222 (H) 74 - 99 mg/dL    Sodium 135 (L) 136 - 145 mmol/L    Potassium 4.0 3.5 - 5.3 mmol/L    Chloride 106 98 - 107 mmol/L    Bicarbonate 20 (L) 21 - 32 mmol/L    Anion Gap 13 10 - 20 mmol/L    Urea Nitrogen 21 6 - 23 mg/dL    Creatinine 1.01 0.50 - 1.30 mg/dL    eGFR 81 >60 mL/min/1.73m*2    Calcium 8.3 (L) 8.6 - 10.3 mg/dL   POCT GLUCOSE   Result Value Ref Range    POCT Glucose 233 (H) 74 - 99 mg/dL   POCT GLUCOSE   Result Value Ref Range    POCT Glucose 294 (H) 74 - 99 mg/dL   POCT GLUCOSE   Result Value Ref Range    POCT Glucose 269 (H) 74 - 99 mg/dL   POCT GLUCOSE   Result Value Ref Range    POCT Glucose 309 (H) 74 - 99 mg/dL   CBC   Result Value Ref Range    WBC 12.3 (H) 4.4 - 11.3 x10*3/uL    nRBC 0.0 0.0 - 0.0 /100 WBCs    RBC 4.56 4.50 - 5.90 x10*6/uL    Hemoglobin 15.0 13.5 - 17.5 g/dL    Hematocrit 43.4 41.0 - 52.0 %     MCV 95 80 - 100 fL    MCH 32.9 26.0 - 34.0 pg    MCHC 34.6 32.0 - 36.0 g/dL    RDW 12.3 11.5 - 14.5 %    Platelets 270 150 - 450 x10*3/uL   Basic Metabolic Panel   Result Value Ref Range    Glucose 181 (H) 74 - 99 mg/dL    Sodium 138 136 - 145 mmol/L    Potassium 3.8 3.5 - 5.3 mmol/L    Chloride 107 98 - 107 mmol/L    Bicarbonate 24 21 - 32 mmol/L    Anion Gap 11 10 - 20 mmol/L    Urea Nitrogen 19 6 - 23 mg/dL    Creatinine 1.11 0.50 - 1.30 mg/dL    eGFR 72 >60 mL/min/1.73m*2    Calcium 8.6 8.6 - 10.3 mg/dL   POCT GLUCOSE   Result Value Ref Range    POCT Glucose 178 (H) 74 - 99 mg/dL   POCT GLUCOSE   Result Value Ref Range    POCT Glucose 288 (H) 74 - 99 mg/dL   POCT GLUCOSE   Result Value Ref Range    POCT Glucose 210 (H) 74 - 99 mg/dL   POCT GLUCOSE   Result Value Ref Range    POCT Glucose 247 (H) 74 - 99 mg/dL   CBC   Result Value Ref Range    WBC 10.6 4.4 - 11.3 x10*3/uL    nRBC 0.0 0.0 - 0.0 /100 WBCs    RBC 4.27 (L) 4.50 - 5.90 x10*6/uL    Hemoglobin 14.1 13.5 - 17.5 g/dL    Hematocrit 40.7 (L) 41.0 - 52.0 %    MCV 95 80 - 100 fL    MCH 33.0 26.0 - 34.0 pg    MCHC 34.6 32.0 - 36.0 g/dL    RDW 12.5 11.5 - 14.5 %    Platelets 294 150 - 450 x10*3/uL   Basic Metabolic Panel   Result Value Ref Range    Glucose 135 (H) 74 - 99 mg/dL    Sodium 138 136 - 145 mmol/L    Potassium 3.7 3.5 - 5.3 mmol/L    Chloride 108 (H) 98 - 107 mmol/L    Bicarbonate 22 21 - 32 mmol/L    Anion Gap 12 10 - 20 mmol/L    Urea Nitrogen 21 6 - 23 mg/dL    Creatinine 1.07 0.50 - 1.30 mg/dL    eGFR 75 >60 mL/min/1.73m*2    Calcium 8.9 8.6 - 10.3 mg/dL   Magnesium   Result Value Ref Range    Magnesium 1.61 1.60 - 2.40 mg/dL        Microbiology data: reviewed    Imaging data: reviewed      Hamlet Wolff MD  [phone]

## 2024-07-15 NOTE — CARE PLAN
The patient's goals for the shift include      The clinical goals for the shift include pt will remain fall free      Problem: Pain - Adult  Goal: Verbalizes/displays adequate comfort level or baseline comfort level  Outcome: Progressing     Problem: Safety - Adult  Goal: Free from fall injury  Outcome: Progressing     Problem: Discharge Planning  Goal: Discharge to home or other facility with appropriate resources  Outcome: Progressing     Problem: Chronic Conditions and Co-morbidities  Goal: Patient's chronic conditions and co-morbidity symptoms are monitored and maintained or improved  Outcome: Progressing     Problem: Diabetes  Goal: Achieve decreasing blood glucose levels by end of shift  Outcome: Progressing  Goal: Increase stability of blood glucose readings by end of shift  Outcome: Progressing  Goal: Decrease in ketones present in urine by end of shift  Outcome: Progressing  Goal: Maintain electrolyte levels within acceptable range throughout shift  Outcome: Progressing  Goal: Maintain glucose levels >70mg/dl to <250mg/dl throughout shift  Outcome: Progressing  Goal: No changes in neurological exam by end of shift  Outcome: Progressing  Goal: Learn about and adhere to nutrition recommendations by end of shift  Outcome: Progressing  Goal: Vital signs within normal range for age by end of shift  Outcome: Progressing  Goal: Increase self care and/or family involovement by end of shift  Outcome: Progressing  Goal: Receive DSME education by end of shift  Outcome: Progressing     Problem: Pain  Goal: Takes deep breaths with improved pain control throughout the shift  Outcome: Progressing  Goal: Turns in bed with improved pain control throughout the shift  Outcome: Progressing  Goal: Walks with improved pain control throughout the shift  Outcome: Progressing  Goal: Performs ADL's with improved pain control throughout shift  Outcome: Progressing  Goal: Participates in PT with improved pain control throughout the  shift  Outcome: Progressing  Goal: Free from opioid side effects throughout the shift  Outcome: Progressing  Goal: Free from acute confusion related to pain meds throughout the shift  Outcome: Progressing     Problem: Fall/Injury  Goal: Not fall by end of shift  Outcome: Progressing  Goal: Be free from injury by end of the shift  Outcome: Progressing  Goal: Verbalize understanding of personal risk factors for fall in the hospital  Outcome: Progressing  Goal: Verbalize understanding of risk factor reduction measures to prevent injury from fall in the home  Outcome: Progressing  Goal: Use assistive devices by end of the shift  Outcome: Progressing  Goal: Pace activities to prevent fatigue by end of the shift  Outcome: Progressing     Problem: Atrial Fibrillation  Goal: I will have no complications due to atrial fibrillation  Outcome: Progressing

## 2024-07-15 NOTE — PROGRESS NOTES
[facility] INFECTIOUS DISEASE PROGRESS NOTE    Patient Name: Jose Olivia  MRN: [id number]    INTERVAL HISTORY:   No fevers. WBC are better. Complains of abx pain wendy w coughing    Patient Active Problem List   Diagnosis    Abnormal EKG    Abnormal stress test    Acute carpal tunnel syndrome, right    Acute sinusitis    Allergic rhinitis    Benign essential hypertension    Bilateral tinnitus    CAD (coronary artery disease)    Cervical vertigo    Chronic cough    Chronic glossitis    Claudication, intermittent (CMS-HCC)    Contact with and (suspected) exposure to covid-19    COPD, mild (Multi)    Mild chronic obstructive pulmonary disease (Multi)    Diabetes mellitus (Multi)    Diabetic peripheral neuropathy (Multi)    Disease suspected    Dizziness    Dry skin    Elevated serum creatinine    Fatigue    Hemiplegia (Multi)    History of elevated lipids    History of hypertension    HLD (hyperlipidemia)    Hyperkalemia    Hypogonadism in male    Imbalance    Insomnia    Left hemiparesis (Multi)    Low testosterone in male    Mild renal insufficiency    Nasal valve blockage    Numbness    Obstructive sleep apnea syndrome    Onychomycosis    Spondylosis of cervical spine    Osteoarthritis of spine with radiculopathy, cervical region    Left knee pain    Pain and swelling of lower leg, left    Pain of foot    Right hand paresthesia    Right hand weakness    Shortness of breath    Stroke (Multi)    Tinea corporis    Ulnar neuropathy at elbow, right    Acute appendicitis with localized peritonitis, without perforation, abscess, or gangrene        ASSESSMENT:   Perf Appendicitis  Localized peritonitis  HELLEN on CKD 3  DM2   CAD  COPD  HTN     Plan   Continue Ceftriaxone and Flagyl  Follow cultures  Bcx x 2 w fevers   Serial abd exams  Consider repeating CT scan of the abdomen and pelvis today  If no abscess detected are significantly improved and If advances with diet then discharge plan will be on oral Omnicef and Flagyl to  complete 10 days of therapy postoperatively.  Repeat imaging may be warranted for worsening symptoms, leukocytosis or adverse abdominal examination    MEDICATIONS: reviewed.    Current Facility-Administered Medications:     acetaminophen (Tylenol) tablet 650 mg, 650 mg, oral, q4h PRN, Woody Chan MD, 650 mg at 07/10/24 1209    albuterol 2.5 mg /3 mL (0.083 %) nebulizer solution 2.5 mg, 2.5 mg, nebulization, q6h PRN, Jaspal Condon DO    apixaban (Eliquis) tablet 5 mg, 5 mg, oral, q12h, Maribel Burgos, APRN-CNP, 5 mg at 07/14/24 2158    aspirin chewable tablet 81 mg, 81 mg, oral, Daily, Gilles Hobson MD, 81 mg at 07/14/24 0925    cefTRIAXone (Rocephin) 2 g in dextrose 5% in water (D5W) 50 mL, 2 g, intravenous, q24h, Hamlet Wolff MD, Stopped at 07/15/24 0646    clopidogrel (Plavix) tablet 75 mg, 75 mg, oral, Daily, Jori Blanco MD, 75 mg at 07/14/24 0925    dextrose 50 % injection 12.5 g, 12.5 g, intravenous, q15 min PRN, Gilles Hobson MD    dextrose 50 % injection 12.5 g, 12.5 g, intravenous, q15 min PRN, Mike Aponte PA-C    dextrose 50 % injection 25 g, 25 g, intravenous, q15 min PRN, Gilles Hobson MD    dextrose 50 % injection 25 g, 25 g, intravenous, q15 min PRN, Mike Aponte PA-C    docusate sodium (Colace) capsule 100 mg, 100 mg, oral, BID, Jaspal Condon DO, 100 mg at 07/14/24 2158    ezetimibe (Zetia) tablet 10 mg, 10 mg, oral, Daily, Gilles Hobson MD, 10 mg at 07/14/24 0925    glucagon (Glucagen) injection 1 mg, 1 mg, intramuscular, q15 min PRN, Gilles Hobson MD    glucagon (Glucagen) injection 1 mg, 1 mg, intramuscular, q15 min PRN, Gilles Hobson MD    glucagon (Glucagen) injection 1 mg, 1 mg, intramuscular, q15 min PRN, Mike Aponte PA-C    glucagon (Glucagen) injection 1 mg, 1 mg, intramuscular, q15 min PRN, Mike Aponte PA-C    guaiFENesin (Mucinex) 12 hr tablet 600 mg, 600 mg, oral, BID PRN, Jaspal Condon DO, 600 mg at 07/14/24 0925    HYDROmorphone PF (Dilaudid)  injection 0.2 mg, 0.2 mg, intravenous, q3h PRN, Riley Reyes, DO, 0.2 mg at 07/11/24 0811    insulin lispro (HumaLOG) injection 0-10 Units, 0-10 Units, subcutaneous, TID, Gilles Hobson MD, 2 Units at 07/14/24 0926    insulin NPH (Isophane) (HumuLIN N,NovoLIN N) injection 25 Units, 25 Units, subcutaneous, q12h JAYRO, Mike Aponte PA-C, 25 Units at 07/14/24 2158    isosorbide mononitrate ER (Imdur) 24 hr tablet 30 mg, 30 mg, oral, Daily, Gilles Hobson MD, 30 mg at 07/14/24 0925    losartan (Cozaar) tablet 100 mg, 100 mg, oral, Daily, Maribel Burgos, APRN-CNP, 100 mg at 07/14/24 2158    metroNIDAZOLE (Flagyl) 500 mg in NaCl (iso-os) 100 mL, 500 mg, intravenous, q8h, Hamlet Wolff MD, Stopped at 07/15/24 0009    ondansetron (Zofran) injection 4 mg, 4 mg, intravenous, q6h PRN, Gilles Hobson MD, 4 mg at 07/11/24 0646    oxyCODONE (Roxicodone) immediate release tablet 10 mg, 10 mg, oral, q4h PRN, Woody Chan MD, 10 mg at 07/10/24 2143    oxyCODONE (Roxicodone) immediate release tablet 5 mg, 5 mg, oral, q4h PRN, Woody Chan MD, 5 mg at 07/13/24 0137    perflutren lipid microspheres (Definity) injection 0.5-10 mL of dilution, 0.5-10 mL of dilution, intravenous, Once in imaging, Diogo Murdock MD    perflutren lipid microspheres (Definity) injection 0.5-10 mL of dilution, 0.5-10 mL of dilution, intravenous, Once in imaging, Diogo Murdock MD    perflutren protein A microsphere (Optison) injection 0.5 mL, 0.5 mL, intravenous, Once in imaging, Diogo Murdock MD    polyethylene glycol (Glycolax, Miralax) packet 17 g, 17 g, oral, BID, Jaspal Condon DO, 17 g at 07/14/24 2158    sennosides (Senokot) tablet 8.6 mg, 1 tablet, oral, BID, Jaspal Condon DO, 8.6 mg at 07/14/24 2158    sulfur hexafluoride microsphr (Lumason) injection 24.28 mg, 2 mL, intravenous, Once in imaging, Diogo Murdock MD    sulfur hexafluoride microsphr (Lumason) injection 24.28 mg, 2 mL, intravenous, Once in imaging, Diogo Murdock MD    tamsulosin  (Flomax) 24 hr capsule 0.4 mg, 0.4 mg, oral, Daily, Jaspal Condon DO, 0.4 mg at 24 0925     PHYSICAL EXAM:  Vital signs: /80 (BP Location: Left arm, Patient Position: Lying)   Pulse 70   Temp 37.1 °C (98.7 °F) (Temporal)   Resp 16   Ht 1.829 m (6')   Wt 92.8 kg (204 lb 9.4 oz)   SpO2 96%   BMI 27.75 kg/m²   Temp (24hrs), Av.3 °C (97.3 °F), Min:35.9 °C (96.7 °F), Max:37.1 °C (98.7 °F)    General: alert, oriented, NAD  Lungs: bilaterally clear to auscultation  Heart: regular rate and rhythm  Abdomen: soft, + tender, non distended, BS+  Extremities: no edema  No rashes  No joint inflammation  Neck supple  Lines ok  No CVAT    Labs:    Results for orders placed or performed during the hospital encounter of 24 (from the past 96 hour(s))   Lactate   Result Value Ref Range    Lactate 0.8 0.4 - 2.0 mmol/L   POCT GLUCOSE   Result Value Ref Range    POCT Glucose 159 (H) 74 - 99 mg/dL   Urinalysis with Reflex Culture and Microscopic   Result Value Ref Range    Color, Urine Yellow Light-Yellow, Yellow, Dark-Yellow    Appearance, Urine Clear Clear    Specific Gravity, Urine 1.020 1.005 - 1.035    pH, Urine 6.0 5.0, 5.5, 6.0, 6.5, 7.0, 7.5, 8.0    Protein, Urine 100 (2+) (A) NEGATIVE, 10 (TRACE), 20 (TRACE) mg/dL    Glucose, Urine 300 (3+) (A) Normal mg/dL    Blood, Urine NEGATIVE NEGATIVE    Ketones, Urine 40 (2+) (A) NEGATIVE mg/dL    Bilirubin, Urine NEGATIVE NEGATIVE    Urobilinogen, Urine Normal Normal mg/dL    Nitrite, Urine NEGATIVE NEGATIVE    Leukocyte Esterase, Urine NEGATIVE NEGATIVE   Extra Urine Gray Tube   Result Value Ref Range    Extra Tube Hold for add-ons.    Urine culture    Specimen: Indwelling (Mcintosh) Catheter; Urine   Result Value Ref Range    Urine Culture No growth    Urinalysis Microscopic   Result Value Ref Range    WBC, Urine 1-5 1-5, NONE /HPF    RBC, Urine 1-2 NONE, 1-2, 3-5 /HPF    Budding Yeast, Urine PRESENT (A) NONE /HPF    Mucus, Urine FEW Reference range not  established. /LPF    Hyaline Casts, Urine OCCASIONAL (A) NONE /LPF   Transthoracic Echo (TTE) Complete   Result Value Ref Range    LVOT diam 2.30 cm    LV Biplane EF 67 %    Tricuspid annular plane systolic excursion 2.7 cm    AV mn grad 5.0 mmHg    LA vol index A/L 20.7 ml/m2    AV pk efrain 1.63 m/s    LV EF 63 %    RV free wall pk S' 18.60 cm/s    RVSP 26.8 mmHg    LVIDd 4.50 cm    Aortic Valve Area by Continuity of VTI 2.47 cm2    Aortic Valve Area by Continuity of Peak Velocity 2.43 cm2    AV pk grad 10.6 mmHg    LV A4C EF 64.3     BSA 2.19 m2   Sterile Fluid Culture/Smear    Specimen: Intra-abdominal Abscess; Fluid   Result Value Ref Range    Sterile Fluid Culture/Smear No growth aerobically and anaerobically     Gram Stain (4+) Abundant Polymorphonuclear leukocytes     Gram Stain No organisms seen    POCT GLUCOSE   Result Value Ref Range    POCT Glucose 262 (H) 74 - 99 mg/dL   Protime-INR   Result Value Ref Range    Protime 13.2 (H) 9.8 - 12.8 seconds    INR 1.2 (H) 0.9 - 1.1   aPTT - baseline   Result Value Ref Range    aPTT 28 27 - 38 seconds   TSH   Result Value Ref Range    Thyroid Stimulating Hormone 4.04 (H) 0.44 - 3.98 mIU/L   POCT GLUCOSE   Result Value Ref Range    POCT Glucose 208 (H) 74 - 99 mg/dL   Heparin Assay, UFH   Result Value Ref Range    Heparin Unfractionated 0.5 See Comment Below for Therapeutic Ranges IU/mL   Heparin Assay, UFH   Result Value Ref Range    Heparin Unfractionated 0.3 See Comment Below for Therapeutic Ranges IU/mL   Basic Metabolic Panel   Result Value Ref Range    Glucose 188 (H) 74 - 99 mg/dL    Sodium 135 (L) 136 - 145 mmol/L    Potassium 3.8 3.5 - 5.3 mmol/L    Chloride 105 98 - 107 mmol/L    Bicarbonate 20 (L) 21 - 32 mmol/L    Anion Gap 14 10 - 20 mmol/L    Urea Nitrogen 21 6 - 23 mg/dL    Creatinine 1.05 0.50 - 1.30 mg/dL    eGFR 77 >60 mL/min/1.73m*2    Calcium 8.5 (L) 8.6 - 10.3 mg/dL   CBC   Result Value Ref Range    WBC 11.8 (H) 4.4 - 11.3 x10*3/uL    nRBC 0.0 0.0 -  0.0 /100 WBCs    RBC 4.48 (L) 4.50 - 5.90 x10*6/uL    Hemoglobin 14.9 13.5 - 17.5 g/dL    Hematocrit 42.3 41.0 - 52.0 %    MCV 94 80 - 100 fL    MCH 33.3 26.0 - 34.0 pg    MCHC 35.2 32.0 - 36.0 g/dL    RDW 12.5 11.5 - 14.5 %    Platelets 229 150 - 450 x10*3/uL   Heparin Assay, UFH   Result Value Ref Range    Heparin Unfractionated 0.3 See Comment Below for Therapeutic Ranges IU/mL   POCT GLUCOSE   Result Value Ref Range    POCT Glucose 193 (H) 74 - 99 mg/dL   POCT GLUCOSE   Result Value Ref Range    POCT Glucose 211 (H) 74 - 99 mg/dL   POCT GLUCOSE   Result Value Ref Range    POCT Glucose 309 (H) 74 - 99 mg/dL   POCT GLUCOSE   Result Value Ref Range    POCT Glucose 292 (H) 74 - 99 mg/dL   CBC   Result Value Ref Range    WBC 12.5 (H) 4.4 - 11.3 x10*3/uL    nRBC 0.0 0.0 - 0.0 /100 WBCs    RBC 4.32 (L) 4.50 - 5.90 x10*6/uL    Hemoglobin 14.5 13.5 - 17.5 g/dL    Hematocrit 40.7 (L) 41.0 - 52.0 %    MCV 94 80 - 100 fL    MCH 33.6 26.0 - 34.0 pg    MCHC 35.6 32.0 - 36.0 g/dL    RDW 12.3 11.5 - 14.5 %    Platelets 245 150 - 450 x10*3/uL   Basic Metabolic Panel   Result Value Ref Range    Glucose 222 (H) 74 - 99 mg/dL    Sodium 135 (L) 136 - 145 mmol/L    Potassium 4.0 3.5 - 5.3 mmol/L    Chloride 106 98 - 107 mmol/L    Bicarbonate 20 (L) 21 - 32 mmol/L    Anion Gap 13 10 - 20 mmol/L    Urea Nitrogen 21 6 - 23 mg/dL    Creatinine 1.01 0.50 - 1.30 mg/dL    eGFR 81 >60 mL/min/1.73m*2    Calcium 8.3 (L) 8.6 - 10.3 mg/dL   POCT GLUCOSE   Result Value Ref Range    POCT Glucose 233 (H) 74 - 99 mg/dL   POCT GLUCOSE   Result Value Ref Range    POCT Glucose 294 (H) 74 - 99 mg/dL   POCT GLUCOSE   Result Value Ref Range    POCT Glucose 269 (H) 74 - 99 mg/dL   POCT GLUCOSE   Result Value Ref Range    POCT Glucose 309 (H) 74 - 99 mg/dL   CBC   Result Value Ref Range    WBC 12.3 (H) 4.4 - 11.3 x10*3/uL    nRBC 0.0 0.0 - 0.0 /100 WBCs    RBC 4.56 4.50 - 5.90 x10*6/uL    Hemoglobin 15.0 13.5 - 17.5 g/dL    Hematocrit 43.4 41.0 - 52.0 %     MCV 95 80 - 100 fL    MCH 32.9 26.0 - 34.0 pg    MCHC 34.6 32.0 - 36.0 g/dL    RDW 12.3 11.5 - 14.5 %    Platelets 270 150 - 450 x10*3/uL   Basic Metabolic Panel   Result Value Ref Range    Glucose 181 (H) 74 - 99 mg/dL    Sodium 138 136 - 145 mmol/L    Potassium 3.8 3.5 - 5.3 mmol/L    Chloride 107 98 - 107 mmol/L    Bicarbonate 24 21 - 32 mmol/L    Anion Gap 11 10 - 20 mmol/L    Urea Nitrogen 19 6 - 23 mg/dL    Creatinine 1.11 0.50 - 1.30 mg/dL    eGFR 72 >60 mL/min/1.73m*2    Calcium 8.6 8.6 - 10.3 mg/dL   POCT GLUCOSE   Result Value Ref Range    POCT Glucose 178 (H) 74 - 99 mg/dL   POCT GLUCOSE   Result Value Ref Range    POCT Glucose 288 (H) 74 - 99 mg/dL   POCT GLUCOSE   Result Value Ref Range    POCT Glucose 210 (H) 74 - 99 mg/dL   POCT GLUCOSE   Result Value Ref Range    POCT Glucose 247 (H) 74 - 99 mg/dL   CBC   Result Value Ref Range    WBC 10.6 4.4 - 11.3 x10*3/uL    nRBC 0.0 0.0 - 0.0 /100 WBCs    RBC 4.27 (L) 4.50 - 5.90 x10*6/uL    Hemoglobin 14.1 13.5 - 17.5 g/dL    Hematocrit 40.7 (L) 41.0 - 52.0 %    MCV 95 80 - 100 fL    MCH 33.0 26.0 - 34.0 pg    MCHC 34.6 32.0 - 36.0 g/dL    RDW 12.5 11.5 - 14.5 %    Platelets 294 150 - 450 x10*3/uL   Basic Metabolic Panel   Result Value Ref Range    Glucose 135 (H) 74 - 99 mg/dL    Sodium 138 136 - 145 mmol/L    Potassium 3.7 3.5 - 5.3 mmol/L    Chloride 108 (H) 98 - 107 mmol/L    Bicarbonate 22 21 - 32 mmol/L    Anion Gap 12 10 - 20 mmol/L    Urea Nitrogen 21 6 - 23 mg/dL    Creatinine 1.07 0.50 - 1.30 mg/dL    eGFR 75 >60 mL/min/1.73m*2    Calcium 8.9 8.6 - 10.3 mg/dL   Magnesium   Result Value Ref Range    Magnesium 1.61 1.60 - 2.40 mg/dL        Microbiology data: reviewed    Imaging data: reviewed      Hamlet Wolff MD  [phone]  7/15/2024  8:50 AM

## 2024-07-15 NOTE — DISCHARGE SUMMARY
Discharge Diagnosis  Acute appendicitis with localized peritonitis, without perforation, abscess, or gangrene    Issues Requiring Follow-Up  Acute appendicitis with localized peritonitis, new onset A-fib, DM 2, acute urinary retention    This discharge took greater than 35 minutes.    Test Results Pending At Discharge  Pending Labs       Order Current Status    Surgical Pathology Exam In process            Hospital Course     68 y.o. male with past medical history of HTN, CAD, mild COPD, DM with diabetic peripheral neuropathy, hyperlipidemia, mild renal insufficiency and CVA 2018 (on aspirin and Plavix since that time) comes to the hospital with abdominal pain.  The patient states that his pain began yesterday morning while he was working on his water heater.  According the patient the pain moved from the middle of the abdomen more to the right.  He reported some nausea and vomiting.  He also reported some diarrhea.  He reported some chills and sweats but denies any chest pain or shortness of breath.  Initially the patient wanted to leave AMA to finish his water heater work but then later decided to stay for surgical intervention.  At this time the patient states his abdominal pain is improved and he is awaiting to go to the OR for surgery later today.         7/8.................No reported: Chest pains, dyspnea, orthopnea, palpitations, fevers or chills.Patient reported that overall he was doing well given recent surgery.  He endorsed some abdominal pain related to recent surgery.     7/9..............The patient reported feeling better today no reported : nausea, vomiting, chest pains or palpitations.The patient felt that his postop related abdominal pain was tolerable.        7/10................No reported: Palpitations, headaches, chest pains, fevers, chills, nausea or vomiting.  Patient endorsed constipation.     7/11.......................  No reported: Syncope, presyncope, chest pains, nausea, vomiting,  palpitations or tremors.However patient did endorse wheezing and shortness of breath this morning.     7/12................  No reported: Nausea, vomiting, headaches, chest pains, palpitations, fevers or chills.   Patient endorsed 2 bowel movements, no abdominal pain reported at time of evaluation.     7/13..................No reported  :palpitations, syncope, presyncope, nausea, vomiting, fevers or chills.Patient endorsed constipation today.  Recent urinary retention prompted placement of Mcintosh catheter.     7/14....................No reported: Headaches, flank pain, back pain, suprapubic pain, fevers, chills, nausea, vomiting or dyspnea    7/15:              Today the patient is found awake alert and interactive appropriately and desiring discharge home.  He is taking meals without difficulty.  Has not required significant pain medication today.  Repeat CT of the abdomen today reveals decreased size of fluid collection.  ID has recommended discharge on Omnicef and Flagyl to complete 10-day course postoperatively.  General surgery feels suitable for discharge and they will follow-up as outpatient.  Otherwise denies interval new symptoms or complaints including chest pain palpitations pleuritic type pain unusual shortness of breath cough sputum nausea worsening abdominal pain, diarrhea fever or chills.     Review of Systems:   Review of system otherwise negative if not aforementioned above in subjective.      Acute appendicitis, complicated by localized peritonitis status post appendectomy   -Finding on CAT scan concerning for acute appendicitis.  Surgery has been consulted plan 4 OR today.  -Leukocytosis secondary appendicitis.  Continue Zosyn.  7/15: Doing well.  Taking diet without difficulty.  Decreasing abdominal pain.  WBC trending down.  Creatinine trending down.  ID recommending Omnicef and Flagyl to complete 10-day course postoperatively.  Will follow-up with general surgery.         Atrial fibrillation      Atrial fibrillation in the postoperative setting, anticoagulation recommended by cardiology  HELLEN with underlying CKD  -Creatinine 1.67 on admission.  Continue IV fluid hydration  -Avoid nephrotoxins check renal functions in AM.  7/15: Will continue on Eliquis.  Creatinine trending down.     Diabetes mellitus  -Continue sliding scale insulin last A1c is from 2022 shows A1c of 6.5  -Recheck A1c     Coronary artery disease  -Continue with aspirin and Plavix postoperatively once okay with surgery.     Hypertension  -Holding losartan in setting of HELLEN resume once renal functions improved.     COPD  -Currently stable       Pertinent Physical Exam At Time of Discharge  Physical Exam  Constitutional:       Appearance: Patient appeared in no acute cardiopulmonary distress.     Comments: Patient alert and oriented to person place time and situation.  HEENT:      Head: Normocephalic and atraumatic.Trachea midline      Nose:No observed congestion or rhinorrhea.     Mouth/Throat: Mucous membranes Moist, Trachea appeared  midline.  Eyes:      Extraocular Movements: Extraocular movements intact.      Pupils: Pupils are equal, round, and reactive to light.      Comments: No scleral icterus or conjunctival injection appreciated.   Cardiovascular:      Rate and Rhythm: Irregular rate and rhythm.No peripheral stigmata of endocarditis appreciated.     Pulmonary:      Lungs appeared clear to auscultation, no adventitious sound appreciated.  Abdominal:      General: Status post surgical intervention, abdomen nondistended, normal bowel sounds, no involuntary guarding or rebound tenderness appreciated.     Comments: Point tenderness on palpation.  Musculoskeletal:       Patient appeared to have full active range of motion for upper and lower extremities, no acute apparent joint deformity appreciated on examination.   No pitting edema or cyanosis appreciated.       Lymphadenopathy:      No appreciable palpable lymphadenopathy  Skin:      General: Skin is warm.      Coloration:  No jaundice     Findings: No Pandey Banks's or Hunter signs appreciated.  No appreciation of erythema or signs of infectious process related to surgical site/Laparoscopic incisions,dressing removed today..  Neurological:      General: No focal sensory or motor deficits appreciated, no meningeal signs or dysmetria noted.      Cranial Nerves: Cranial nerves II to XII appearing grossly intact.     Genitals:  Deferred  Psychiatric:         The patient appears to be displaying normal mood and affect at the time of evaluation.     Home Medications     Medication List      START taking these medications     amoxicillin-pot clavulanate 875-125 mg tablet; Commonly known as:   Augmentin; Take 1 tablet by mouth every 12 hours for 10 days.; Notes to   patient: Do not  from pharmacy   cefdinir 300 mg capsule; Commonly known as: Omnicef; Take 1 capsule (300   mg) by mouth 2 times a day for 3 days.   Eliquis 5 mg tablet; Generic drug: apixaban; Take 1 tablet (5 mg) by   mouth every 12 hours.   * metroNIDAZOLE 500 mg tablet; Commonly known as: FlagyL; Take 1 tablet   (500 mg) by mouth 3 times a day for 10 days.   * metroNIDAZOLE 500 mg tablet; Commonly known as: Flagyl; Take 1 tablet   (500 mg) by mouth 2 times a day for 3 days.   oxyCODONE 5 mg immediate release tablet; Commonly known as: Roxicodone;   Take 1 tablet (5 mg) by mouth every 6 hours if needed for moderate pain (4   - 6) for up to 3 days.   tamsulosin 0.4 mg 24 hr capsule; Commonly known as: Flomax; Take 1   capsule (0.4 mg) by mouth once daily.; Start taking on: July 16, 2024  * This list has 2 medication(s) that are the same as other medications   prescribed for you. Read the directions carefully, and ask your doctor or   other care provider to review them with you.     CONTINUE taking these medications     ascorbic acid (vitamin C) 500 mg capsule   aspirin 81 mg chewable tablet   Breeze 2 Test Strips strip; Generic  "drug: blood sugar diagnostic, disc   cetirizine 10 mg tablet; Commonly known as: ZyrTEC   cholecalciferol 50 mcg (2,000 unit) capsule; Commonly known as: Vitamin   D-3   clopidogrel 75 mg tablet; Commonly known as: Plavix; Take 1 tablet (75   mg) by mouth once daily.   cyanocobalamin 500 mcg tablet; Commonly known as: Vitamin B-12   Easy Touch Insulin Syringe 0.5 mL 31 gauge x 5/16\" syringe; Generic   drug: insulin syringe-needle U-100   ezetimibe 10 mg tablet; Commonly known as: Zetia   insulin NPH (Isophane) 100 unit/mL injection; Commonly known as: HumuLIN   N,NovoLIN N   isosorbide mononitrate ER 30 mg 24 hr tablet; Commonly known as: Imdur;   Take 1 tablet (30 mg) by mouth once daily.   losartan 50 mg tablet; Commonly known as: Cozaar   multivitamin tablet   NovoLIN R Regular U100 Insulin 100 unit/mL injection; Generic drug:   insulin regular   OneTouch Verio test strips strip; Generic drug: blood sugar diagnostic   Ozempic 0.25 mg or 0.5 mg (2 mg/3 mL) pen injector; Generic drug:   semaglutide; Notes to patient: As prior to admission    Stiolto Respimat 2.5-2.5 mcg/actuation mist inhaler; Generic drug:   tiotropium-olodateroL   vitamin B complex tablet     STOP taking these medications     magnesium glycinate 100 mg magnesium capsule       Outpatient Follow-Up  General surgery, PCP, cardiology    Buck Siddiqi MD  "

## 2024-07-16 ENCOUNTER — TELEPHONE (OUTPATIENT)
Dept: CARDIOLOGY | Facility: CLINIC | Age: 69
End: 2024-07-16

## 2024-07-16 NOTE — TELEPHONE ENCOUNTER
Called patient and left voicemail in regards to post hospital follow up visit. Gave patient day and time on voicemail and if it didn't work to call us back.

## 2024-07-17 LAB
LABORATORY COMMENT REPORT: NORMAL
PATH REPORT.FINAL DX SPEC: NORMAL
PATH REPORT.GROSS SPEC: NORMAL
PATH REPORT.RELEVANT HX SPEC: NORMAL
PATH REPORT.TOTAL CANCER: NORMAL

## 2024-07-21 NOTE — PROGRESS NOTES
Chief Complaint/Reason for Visit:   Hospital follow-up    History Of Present Illness:    Mr. Olivia is coming in today as a hospital follow-up.  He was hospitalized from July 7 through July 15 after having his appendix removed with A-fib RVR postoperatively.  Patient was initially treated with heparin and was changed to Eliquis on discharge.  Plans were to consider a cardioversion after 3 to 4 weeks of uninterrupted anticoagulation.  We have followed this patient previously for coronary artery disease, hypertension, and dyslipidemia.  Patient's other medical history consists of mild COPD, diabetes, peripheral neuropathy, mild renal insufficiency, and a stroke in 2018.    Patient comes in today status post hospital.  He has been feeling rundown and reports that he is sleeping a lot.  He has not been having any significant chest pain, pressure, palpitations, orthopnea, or edema.  He has some dyspnea on exertion with moderate activity.  He also notes that he feels off balance at times.  He saw his PCP last week and was found to be hypotensive and was taken off of losartan.  He also had lab work done to see if he was anemic.  He finds out the results later this week.  (Outside lab)    Past Medical History:  He has a past medical history of Diabetes mellitus (Multi), Other conditions influencing health status, Personal history of other diseases of the circulatory system (03/08/2017), Personal history of other diseases of the respiratory system (03/08/2017), and Personal history of other endocrine, nutritional and metabolic disease (03/08/2017).    Past Surgical History:  He has a past surgical history that includes Prostate surgery (04/05/2016); Knee surgery (04/05/2016); Tonsillectomy (04/05/2016); MR angio neck wo IV contrast (5/18/2022); and MR angio head wo IV contrast (5/18/2022).      Social History:  He reports that he has quit smoking. His smoking use included cigarettes. He has never used smokeless tobacco. No  "history on file for alcohol use and drug use.    Family History:  No family history on file.     Allergies:  Patient has no known allergies.    Medications:  Current Outpatient Medications   Medication Instructions    ascorbic acid (vitamin C) 1,000 mg, oral, 2 times daily    aspirin 81 mg chewable tablet 1 tablet, oral, Daily    blood sugar diagnostic, disc (Breeze 2 Test Strips) strip Dilma Breeze 2 Test DISK Quantity: 400 Refills: 0 Start : 24-Oct-2014 Active    cetirizine (ZYRTEC) 10 mg, oral, Daily    cholecalciferol (Vitamin D-3) 50 mcg (2,000 unit) capsule oral    clopidogrel (PLAVIX) 75 mg, oral, Daily    cyanocobalamin (Vitamin B-12) 500 mcg tablet oral    Easy Touch Insulin Syringe 0.5 mL 31 gauge x 5/16\" syringe     Eliquis 5 mg, oral, Every 12 hours    ezetimibe (ZETIA) 10 mg, oral, Daily    insulin NPH (Isophane) (HUMULIN N,NOVOLIN N) 25 Units, subcutaneous, 2 times daily before meals, Take as directed per insulin instructions.    insulin regular (NovoLIN R Regular U100 Insulin) 100 unit/mL injection injectable 3 times a day (before meals)Sliding scale coverage    isosorbide mononitrate ER (IMDUR) 30 mg, oral, Daily    multivitamin tablet oral    OneTouch Verio test strips strip use to take blood sugars at least 3-4 times per day    Ozempic 0.25 mg or 0.5 mg (2 mg/3 mL) pen injector     Stiolto Respimat 2.5-2.5 mcg/actuation mist inhaler 2 puffs inhaled once a day    tamsulosin (FLOMAX) 0.4 mg, oral, Daily    vitamin B complex tablet 1 tablet, oral, Daily       Review of Systems:  Review of Systems   Constitutional: Positive for malaise/fatigue. Negative for decreased appetite.        Sleeping a lot     HENT: Negative.     Eyes:  Negative for blurred vision and visual disturbance.   Cardiovascular:  Positive for dyspnea on exertion (with moderate activity). Negative for chest pain, irregular heartbeat, leg swelling, orthopnea, palpitations and syncope.   Respiratory: Negative.  Negative for cough and " shortness of breath.    Musculoskeletal:  Negative for arthritis and falls.        Feels off balance.   Gastrointestinal: Negative.    Neurological:  Negative for focal weakness and light-headedness.   Psychiatric/Behavioral:  Negative for depression and memory loss.         Vitals  Visit Vitals  /72 (BP Location: Left arm, Patient Position: Sitting, BP Cuff Size: Adult)   Pulse 75   Ht 1.829 m (6')   Wt 88.9 kg (196 lb)   SpO2 97%   BMI 26.58 kg/m²   Smoking Status Former   BSA 2.13 m²   '    Physical Exam:  Physical Exam  Constitutional:       Appearance: Normal appearance.   HENT:      Head: Normocephalic.   Eyes:      Conjunctiva/sclera: Conjunctivae normal.   Cardiovascular:      Rate and Rhythm: Normal rate and regular rhythm.      Pulses: Normal pulses.      Heart sounds: S1 normal and S2 normal. No murmur heard.     No friction rub. No gallop.   Pulmonary:      Effort: Pulmonary effort is normal.      Breath sounds: Normal breath sounds.   Abdominal:      General: Bowel sounds are normal.      Palpations: Abdomen is soft.      Tenderness: There is no abdominal tenderness.   Musculoskeletal:      Cervical back: Neck supple.      Right lower leg: No edema.      Left lower leg: No edema.   Skin:     General: Skin is warm and dry.   Neurological:      General: No focal deficit present.      Mental Status: He is alert and oriented to person, place, and time.   Psychiatric:         Attention and Perception: Attention normal.         Mood and Affect: Mood normal.           Last Labs:  CBC -  Lab Results   Component Value Date    WBC 10.6 07/15/2024    HGB 14.1 07/15/2024    HCT 40.7 (L) 07/15/2024    MCV 95 07/15/2024     07/15/2024     Lab Results   Component Value Date    GLUCOSE 135 (H) 07/15/2024    CALCIUM 8.9 07/15/2024     07/15/2024    K 3.7 07/15/2024    CO2 22 07/15/2024     (H) 07/15/2024    BUN 21 07/15/2024    CREATININE 1.07 07/15/2024      CMP -  Lab Results   Component Value  Date    CALCIUM 8.9 07/15/2024    PHOS 4.1 11/16/2019    PROT 6.4 07/07/2024    ALBUMIN 3.8 07/07/2024    AST 14 07/07/2024    ALT 19 07/07/2024    ALKPHOS 68 07/07/2024    BILITOT 1.3 (H) 07/07/2024       LIPID PANEL -   Lab Results   Component Value Date    CHOL 150 05/19/2022    TRIG 204 (H) 05/19/2022    HDL 40.1 05/19/2022    CHHDL 3.7 05/19/2022    LDLF 69 05/19/2022    VLDL 41 (H) 05/19/2022    NHDL 110 05/19/2022       Lab Results   Component Value Date     (H) 05/19/2022    HGBA1C 6.9 (H) 07/07/2024       Last Cardiology Tests:  ECG:  EKG done in the office today and personally reviewed shows sinus rhythm at 81 bpm, QT corrected interval 453 ms.  There are no significant ST or T wave abnormalities.  This will go to Dr. Murdock for review.    Echo:7-11-24  CONCLUSIONS:   1. The left ventricular systolic function is normal, with a visually estimated ejection fraction of 60-65%.   2. There is normal right ventricular global systolic function.     Cath:12-20-21  CONCLUSIONS:   1. Double vessel coronary artery disease without proximal left anterior descending involvement.   2. Left Ventricular end-diastolic pressure = 9.   3. Normal LV filling pressures.       Lab review: I have personally reviewed the laboratory result(s)     Assessment/Plan:  Atrial fibrillation: Patient had atrial fibrillation during his recent hospitalization for a ruptured appendix.  He left the hospital and rate controlled atrial fibrillation with plans for an outpatient cardioversion.  Patient was anticoagulated with Eliquis due to an elevated [id number] score.  Patient comes in today and has converted into sinus rhythm spontaneously.  I recommended that he continue on anticoagulation.  He is not having any abnormal bleeding or bruising.    Coronary artery disease: Patient has a history of moderate coronary artery disease.  He is being treated medically.  Patient's current regimen consists of low-dose aspirin, Zetia, and Imdur.  He  had been on Plavix but it was discontinued when he started on anticoagulation.  We also have been avoiding beta-blockers due to intermittent bradycardia and probable sick sinus syndrome.  Patient is currently angina class 0.  He should be on a heart healthy low-sodium diet.  Continue on medical therapy.    Dyspnea on exertion: Patient has dyspnea on exertion with moderate activity.  He does not appear to be volume overloaded.  He is deconditioned from his recent hospitalization.  He is following up with his PCP this week.  Recent chest x-ray showed no acute abnormalities.    Patient will be scheduled to follow-up with Dr. Murdock in 3 months.  He no longer requires a cardioversion.  Patient instructed to call with any cardiovascular complaints. All questions were answered.       Dragon dictation was utilized to create this document. Quite often unanticipated grammatical, syntax,  and other interpretive errors are inadvertently transcribed by the computer software.  Please disregard these errors.  Please excuse any errors that have escaped final proofreading.             Maribel Burgos, APRN-CNP

## 2024-07-21 NOTE — DOCUMENTATION CLARIFICATION NOTE
"    PATIENT:               WAQAR BOO  ACCT #:                  [id number]  MRN:                       [id number]  :                       1955  ADMIT DATE:       2024 12:08 AM  DISCH DATE:        7/15/2024 6:56 PM  RESPONDING PROVIDER #:        [id number]          PROVIDER RESPONSE TEXT:    Abscess ruled in for this admission    CDI QUERY TEXT:    Clarification        Instruction:    Based on your assessment of the patient and the clinical information, please provide the requested documentation by clicking on the appropriate radio button and enter any additional information if prompted.    Question: Please further clarify the diagnosis of abscess as    When answering this query, please exercise your independent professional judgment. The fact that a question is being asked, does not imply that any particular answer is desired or expected.    The patient's clinical indicators include:  Clinical Information:  - 67yo male admitted for perforated appendicitis. PMH includes  DM, HTN, COPD, CKD 3, CAD, HLD.    Clinical Indicators:    -  Dr Condon : \" workup for suspected intraperitoneal abscess with CTA and possible drainage\"    -  Dr Andujar IE : \"A 5 Divehi Yueh catheter was then advanced into the collection under direct ultrasound guidance. Ultrasound imaging obtained of the needle placement. Aspiration was then performed yielding proximally 10 mL of simple appearing yellowish fluid. No gross purulence. \"    - 7/15  Dr Siddiqi : \" Repeat CT of the abdomen today reveals decreased size of fluid collection.\"    - 7/15 CT AP : Interval decrease in size of the right lower quadrant fluid collection as detailed above. Slightly decreased smaller fluid collection directly adjacent to the surgical suture line. Decreased regional mesenteric edema.      Treatment: Fluid aspiration.    Risk Factors: Perforated appendix s/p appendectomy.  Options provided:  -- Abscess ruled out after workup  -- Abscess ruled in for this " admission  -- Other - I will add my own diagnosis  -- Refer to Clinical Documentation Reviewer    Query created by: Sofia Chakraborty on 7/19/2024 3:25 PM      Electronically signed by:  ANGELA IVEY MD 7/21/2024 2:09 PM

## 2024-07-25 ENCOUNTER — APPOINTMENT (OUTPATIENT)
Dept: CARDIOLOGY | Facility: CLINIC | Age: 69
End: 2024-07-25
Payer: MEDICARE

## 2024-07-25 VITALS
WEIGHT: 196 LBS | SYSTOLIC BLOOD PRESSURE: 124 MMHG | OXYGEN SATURATION: 97 % | BODY MASS INDEX: 26.55 KG/M2 | HEART RATE: 75 BPM | DIASTOLIC BLOOD PRESSURE: 72 MMHG | HEIGHT: 72 IN

## 2024-07-25 DIAGNOSIS — R94.31 ABNORMAL EKG: Primary | ICD-10-CM

## 2024-07-25 DIAGNOSIS — I10 BENIGN ESSENTIAL HYPERTENSION: ICD-10-CM

## 2024-07-25 DIAGNOSIS — I25.10 CORONARY ARTERY DISEASE INVOLVING NATIVE CORONARY ARTERY OF NATIVE HEART WITHOUT ANGINA PECTORIS: ICD-10-CM

## 2024-07-25 DIAGNOSIS — R06.09 DOE (DYSPNEA ON EXERTION): ICD-10-CM

## 2024-07-25 DIAGNOSIS — I48.0 PAROXYSMAL ATRIAL FIBRILLATION (MULTI): ICD-10-CM

## 2024-07-25 PROCEDURE — 3074F SYST BP LT 130 MM HG: CPT | Performed by: NURSE PRACTITIONER

## 2024-07-25 PROCEDURE — 93000 ELECTROCARDIOGRAM COMPLETE: CPT | Performed by: INTERNAL MEDICINE

## 2024-07-25 PROCEDURE — 3008F BODY MASS INDEX DOCD: CPT | Performed by: NURSE PRACTITIONER

## 2024-07-25 PROCEDURE — 1036F TOBACCO NON-USER: CPT | Performed by: NURSE PRACTITIONER

## 2024-07-25 PROCEDURE — 1160F RVW MEDS BY RX/DR IN RCRD: CPT | Performed by: NURSE PRACTITIONER

## 2024-07-25 PROCEDURE — 3078F DIAST BP <80 MM HG: CPT | Performed by: NURSE PRACTITIONER

## 2024-07-25 PROCEDURE — 1157F ADVNC CARE PLAN IN RCRD: CPT | Performed by: NURSE PRACTITIONER

## 2024-07-25 PROCEDURE — 1111F DSCHRG MED/CURRENT MED MERGE: CPT | Performed by: NURSE PRACTITIONER

## 2024-07-25 PROCEDURE — 99214 OFFICE O/P EST MOD 30 MIN: CPT | Performed by: NURSE PRACTITIONER

## 2024-07-25 PROCEDURE — 3044F HG A1C LEVEL LT 7.0%: CPT | Performed by: NURSE PRACTITIONER

## 2024-07-25 PROCEDURE — 1159F MED LIST DOCD IN RCRD: CPT | Performed by: NURSE PRACTITIONER

## 2024-07-25 ASSESSMENT — ENCOUNTER SYMPTOMS
LIGHT-HEADEDNESS: 0
GASTROINTESTINAL NEGATIVE: 1
IRREGULAR HEARTBEAT: 0
MEMORY LOSS: 0
SYNCOPE: 0
DEPRESSION: 0
PALPITATIONS: 0
DECREASED APPETITE: 0
FALLS: 0
SHORTNESS OF BREATH: 0
COUGH: 0
BLURRED VISION: 0
RESPIRATORY NEGATIVE: 1
ORTHOPNEA: 0
FOCAL WEAKNESS: 0
DYSPNEA ON EXERTION: 1

## 2024-07-25 NOTE — PATIENT INSTRUCTIONS
Continue on current meds  Heart healthy, low sodium diet  Mediterranean diet is recommended  Follow up with Dr Murdock in 3 months

## 2024-07-26 ENCOUNTER — HOSPITAL ENCOUNTER (OUTPATIENT)
Dept: RADIOLOGY | Facility: HOSPITAL | Age: 69
Discharge: HOME | End: 2024-07-26
Payer: MEDICARE

## 2024-07-26 DIAGNOSIS — L02.211 ABDOMINAL WALL ABSCESS: ICD-10-CM

## 2024-07-26 DIAGNOSIS — K35.32 PERFORATED APPENDICITIS: ICD-10-CM

## 2024-07-26 PROCEDURE — 74177 CT ABD & PELVIS W/CONTRAST: CPT

## 2024-07-26 PROCEDURE — 2550000001 HC RX 255 CONTRASTS: Performed by: SURGERY

## 2024-07-26 PROCEDURE — A9698 NON-RAD CONTRAST MATERIALNOC: HCPCS | Performed by: SURGERY

## 2024-08-01 ENCOUNTER — DOCUMENTATION (OUTPATIENT)
Dept: CARDIOLOGY | Facility: HOSPITAL | Age: 69
End: 2024-08-01
Payer: MEDICARE

## 2024-08-01 NOTE — PROGRESS NOTES
Request from [facility] Gilles Hobson MD for colon screening on 10/7/2024 and would like patient to hold Plavix and Eliquis prior to procedure. ( No longer on Plavix)    Diagnosis/what are we seeing for:  CAD    Last ischemic work up left heart cath  Date: 2021  Anticoagulation: Apixaban  Antiplatelet: ASA  Has patient had a recent cardioversion or ablation?  no     Last seen by  GABRIEL Pringle-CNP    Next appointment:  10/25/2024                         [address] [place] [address]                   Phone# [phone]              Fax# [phone]      Date: 24    RE: Jose Olivia            : 1955       Surgical/Procedural Clearance for:  Colonoscopy  Patient is at: LOW cardiovascular risk for this LOW risk procedure.           N/A hold Antiplatelet      Can hold Anticoagulant Apixaban for 3 days prior                     Is further cardiac workup is needed prior to the procedure?  No     Patient should continue Beta Blocker in the perioperative period.  Yes     Patient should resume antiplatelet/anticoagulation as soon as cleared by surgeon/procedure physician.  Yes       Thank You,    24 at 8:59 AM - Diogo Murdock MD

## 2024-08-12 ENCOUNTER — APPOINTMENT (OUTPATIENT)
Dept: UROLOGY | Facility: CLINIC | Age: 69
End: 2024-08-12
Payer: MEDICARE

## 2024-08-12 VITALS — SYSTOLIC BLOOD PRESSURE: 117 MMHG | DIASTOLIC BLOOD PRESSURE: 66 MMHG | HEART RATE: 99 BPM

## 2024-08-12 DIAGNOSIS — N40.1 BENIGN PROSTATIC HYPERPLASIA WITH LOWER URINARY TRACT SYMPTOMS, SYMPTOM DETAILS UNSPECIFIED: Primary | ICD-10-CM

## 2024-08-12 PROCEDURE — 1157F ADVNC CARE PLAN IN RCRD: CPT | Performed by: UROLOGY

## 2024-08-12 PROCEDURE — 1036F TOBACCO NON-USER: CPT | Performed by: UROLOGY

## 2024-08-12 PROCEDURE — 3078F DIAST BP <80 MM HG: CPT | Performed by: UROLOGY

## 2024-08-12 PROCEDURE — 3074F SYST BP LT 130 MM HG: CPT | Performed by: UROLOGY

## 2024-08-12 PROCEDURE — 99214 OFFICE O/P EST MOD 30 MIN: CPT | Performed by: UROLOGY

## 2024-08-12 PROCEDURE — 1111F DSCHRG MED/CURRENT MED MERGE: CPT | Performed by: UROLOGY

## 2024-08-12 PROCEDURE — 1159F MED LIST DOCD IN RCRD: CPT | Performed by: UROLOGY

## 2024-08-12 PROCEDURE — 3044F HG A1C LEVEL LT 7.0%: CPT | Performed by: UROLOGY

## 2024-08-12 RX ORDER — TAMSULOSIN HYDROCHLORIDE 0.4 MG/1
0.4 CAPSULE ORAL DAILY
Qty: 90 CAPSULE | Refills: 3 | Status: SHIPPED | OUTPATIENT
Start: 2024-08-12

## 2024-08-12 NOTE — PROGRESS NOTES
08/12/2024  Here for BPH and ED follow-up.  On Flomax and Viagra 100 mg daily as needed and also use vacuum pump    Patient has no nausea, no vomiting, no fever.    FERNANDO: Deferred    PSA: Pending    We discussed benign prostate hypertrophy with moderate voiding symptoms on Flomax 0.4 mg daily  We discussed that erectile dysfunction, combination of vacuum pump and 50 mg Viagra use  We discussed yearly follow-up with PCP  All the questions were answered, the patient expressed understanding and agreed to the plan.    Impression  BPH  ED  PSA screening    Plan  Continue Flomax 0.4 mg daily  Combination of vacuum pump and Viagra 50 mg as needed  Yearly PSA check at PCP office  Appointment in a year    Chief Complaint   Patient presents with    Med Refill     Patient here today because he went to ER and they said he had bladder wall thickening. They did insert catheter. They said his prostate was swollen. He seen  and had surgery with for this in 2013. He needs a refill on flomax 0.4mg taking once a day. This was prescribed at ER.    He does feel that he empties well since being on flomax. He is urinating 5-6 times a day. Could not leave sample.      Physical Exam     TODAYS LAB RESULTS:      ASSESSMENT&PLAN:      IMPRESSIONS:       1/4/23 Dr. Burns  NEW PATIENT  1. PSA screening  PSA 4/2022 0.74 outside lab     2. Screening for low testosterone  No history   Significant fatigue last 10 years  Libido intact      3. ED  Uses MARIKA  Tolerates well   Able to penetrate   Had tried Viagra in past - moderate benefit   No cardiac history; no chest pain; can complete 4 METs        PSA 4 /2022 0.74 outside lab

## 2024-08-13 DIAGNOSIS — I48.91 ATRIAL FIBRILLATION, UNSPECIFIED TYPE (MULTI): ICD-10-CM

## 2024-08-21 ENCOUNTER — TELEPHONE (OUTPATIENT)
Dept: CARDIOLOGY | Facility: HOSPITAL | Age: 69
End: 2024-08-21
Payer: MEDICARE

## 2024-08-21 NOTE — TELEPHONE ENCOUNTER
8/21/24  1630  RN called about bruising/ bleeding elbow and tongue from a sore while on Eliquis and Plavix. Pt states he could not control the bleeding so he quit taking his eliquis for 3 days and has than cut his dose in half.   RN educated pt on how being on Plavix and Eliquis will cause bruising by the slightest bump against something and if that bleeding were to occur but a cut to apply pressure and if bleeding can not be controlled needs to go to the ER.   Pt states he can not live like this bleeding all the time.    Pt wants to know if he can come off of them and or stop one of the medications.   RN notified MD about concern.    Patient left  stating that after being prescribed Eliquis he has experienced excessive bruising and bleeding attempted to call patient back left  for patient to call office back.

## 2024-08-22 ENCOUNTER — TELEPHONE (OUTPATIENT)
Dept: CARDIOLOGY | Facility: HOSPITAL | Age: 69
End: 2024-08-22
Payer: MEDICARE

## 2024-08-22 NOTE — TELEPHONE ENCOUNTER
Rn spoke to Dr. Murdock about pt taking plavix and eliquis. Pt needs to stop Plavix per Dr. Murdock verbal order. RN called pt to give instructions on the fact he is only supposed to be taking Eliquis and to stop taking the plavix. Pt verablized undertstanding at this time.

## 2024-10-07 ENCOUNTER — ANESTHESIA EVENT (OUTPATIENT)
Dept: GASTROENTEROLOGY | Facility: HOSPITAL | Age: 69
End: 2024-10-07
Payer: MEDICARE

## 2024-10-07 ENCOUNTER — HOSPITAL ENCOUNTER (OUTPATIENT)
Dept: GASTROENTEROLOGY | Facility: HOSPITAL | Age: 69
Discharge: HOME | End: 2024-10-07
Payer: MEDICARE

## 2024-10-07 ENCOUNTER — ANESTHESIA (OUTPATIENT)
Dept: GASTROENTEROLOGY | Facility: HOSPITAL | Age: 69
End: 2024-10-07
Payer: MEDICARE

## 2024-10-07 VITALS
DIASTOLIC BLOOD PRESSURE: 89 MMHG | WEIGHT: 208 LBS | HEIGHT: 72 IN | SYSTOLIC BLOOD PRESSURE: 145 MMHG | RESPIRATION RATE: 16 BRPM | OXYGEN SATURATION: 97 % | BODY MASS INDEX: 28.17 KG/M2 | HEART RATE: 64 BPM | TEMPERATURE: 97 F

## 2024-10-07 DIAGNOSIS — Z12.11 SCREEN FOR COLON CANCER: ICD-10-CM

## 2024-10-07 LAB — GLUCOSE BLD MANUAL STRIP-MCNC: 138 MG/DL (ref 74–99)

## 2024-10-07 PROCEDURE — 82947 ASSAY GLUCOSE BLOOD QUANT: CPT

## 2024-10-07 PROCEDURE — 3700000001 HC GENERAL ANESTHESIA TIME - INITIAL BASE CHARGE

## 2024-10-07 PROCEDURE — 2500000004 HC RX 250 GENERAL PHARMACY W/ HCPCS (ALT 636 FOR OP/ED): Performed by: NURSE ANESTHETIST, CERTIFIED REGISTERED

## 2024-10-07 PROCEDURE — 2500000004 HC RX 250 GENERAL PHARMACY W/ HCPCS (ALT 636 FOR OP/ED): Performed by: SURGERY

## 2024-10-07 PROCEDURE — 45380 COLONOSCOPY AND BIOPSY: CPT | Performed by: SURGERY

## 2024-10-07 PROCEDURE — 7100000009 HC PHASE TWO TIME - INITIAL BASE CHARGE

## 2024-10-07 PROCEDURE — 7100000010 HC PHASE TWO TIME - EACH INCREMENTAL 1 MINUTE

## 2024-10-07 PROCEDURE — 3700000002 HC GENERAL ANESTHESIA TIME - EACH INCREMENTAL 1 MINUTE

## 2024-10-07 RX ORDER — LIDOCAINE HCL/PF 100 MG/5ML
SYRINGE (ML) INTRAVENOUS AS NEEDED
Status: DISCONTINUED | OUTPATIENT
Start: 2024-10-07 | End: 2024-10-07

## 2024-10-07 RX ORDER — PROPOFOL 10 MG/ML
INJECTION, EMULSION INTRAVENOUS AS NEEDED
Status: DISCONTINUED | OUTPATIENT
Start: 2024-10-07 | End: 2024-10-07

## 2024-10-07 RX ORDER — SODIUM CHLORIDE 9 MG/ML
20 INJECTION, SOLUTION INTRAVENOUS CONTINUOUS
Status: DISCONTINUED | OUTPATIENT
Start: 2024-10-07 | End: 2024-10-08 | Stop reason: HOSPADM

## 2024-10-07 SDOH — HEALTH STABILITY: MENTAL HEALTH: CURRENT SMOKER: 0

## 2024-10-07 ASSESSMENT — PAIN SCALES - GENERAL
PAINLEVEL_OUTOF10: 0 - NO PAIN

## 2024-10-07 ASSESSMENT — PAIN - FUNCTIONAL ASSESSMENT
PAIN_FUNCTIONAL_ASSESSMENT: 0-10

## 2024-10-07 ASSESSMENT — COLUMBIA-SUICIDE SEVERITY RATING SCALE - C-SSRS
6. HAVE YOU EVER DONE ANYTHING, STARTED TO DO ANYTHING, OR PREPARED TO DO ANYTHING TO END YOUR LIFE?: NO
1. IN THE PAST MONTH, HAVE YOU WISHED YOU WERE DEAD OR WISHED YOU COULD GO TO SLEEP AND NOT WAKE UP?: NO
2. HAVE YOU ACTUALLY HAD ANY THOUGHTS OF KILLING YOURSELF?: NO

## 2024-10-07 NOTE — H&P
History Of Present Illness  Jose Olivia is a 69 y.o. male presenting with hx appendicitis.     Past Medical History  He has a past medical history of Diabetes mellitus (Multi), Other conditions influencing health status, Personal history of other diseases of the circulatory system (03/08/2017), Personal history of other diseases of the respiratory system (03/08/2017), and Personal history of other endocrine, nutritional and metabolic disease (03/08/2017).    Surgical History  He has a past surgical history that includes Prostate surgery (04/05/2016); Knee surgery (04/05/2016); Tonsillectomy (04/05/2016); MR angio neck wo IV contrast (05/18/2022); MR angio head wo IV contrast (05/18/2022); and Appendectomy (07/2024).     Social History  He reports that he has quit smoking. His smoking use included cigarettes. He has never used smokeless tobacco. He reports current alcohol use. He reports that he does not use drugs.    Family History  No family history on file.     Allergies  Patient has no known allergies.    Review of Systems   All other systems reviewed and are negative.       Physical Exam  Vitals reviewed.   Cardiovascular:      Rate and Rhythm: Normal rate and regular rhythm.   Pulmonary:      Breath sounds: Normal breath sounds.   Skin:     General: Skin is warm and dry.   Neurological:      Mental Status: He is alert.   Psychiatric:         Mood and Affect: Mood normal.          Last Recorded Vitals  Blood pressure 138/75, pulse 67, temperature 36.1 °C (97 °F), temperature source Temporal, resp. rate 16, height 1.829 m (6'), weight 94.3 kg (208 lb), SpO2 98%.    Relevant Results          .     Assessment/Plan   Assessment & Plan  Screen for colon cancer      For colo       I spent 15 minutes in the professional and overall care of this patient.      Gilles Hobson MD

## 2024-10-07 NOTE — ANESTHESIA POSTPROCEDURE EVALUATION
Patient: Jose Olivia    Procedure Summary       Date: 10/07/24 Room / Location: [facility]    Anesthesia Start: 1132 Anesthesia Stop: 1210    Procedure: COLONOSCOPY Diagnosis: Screen for colon cancer    Scheduled Providers: Gilles Hobson MD Responsible Provider: FLAKITO Sherman    Anesthesia Type: MAC ASA Status: 3            Anesthesia Type: MAC    Vitals Value Taken Time   /67 10/07/24 1218   Temp 37.1 °C (98.7 °F) 10/07/24 1208   Pulse 57 10/07/24 1218   Resp 16 10/07/24 1218   SpO2 100 % 10/07/24 1218       Anesthesia Post Evaluation    Patient location during evaluation: bedside  Patient participation: complete - patient participated  Level of consciousness: awake  Pain management: adequate  Airway patency: patent  Cardiovascular status: acceptable  Respiratory status: acceptable  Hydration status: acceptable  Postoperative Nausea and Vomiting: none    There were no known notable events for this encounter.

## 2024-10-23 RX ORDER — LOSARTAN POTASSIUM 50 MG/1
1 TABLET ORAL
COMMUNITY
Start: 2024-09-05

## 2024-10-23 RX ORDER — SILDENAFIL CITRATE 20 MG/1
20 TABLET ORAL
COMMUNITY
Start: 2024-08-30

## 2024-10-25 ENCOUNTER — APPOINTMENT (OUTPATIENT)
Dept: CARDIOLOGY | Facility: CLINIC | Age: 69
End: 2024-10-25
Payer: MEDICARE

## 2024-10-25 VITALS
HEIGHT: 72 IN | DIASTOLIC BLOOD PRESSURE: 62 MMHG | BODY MASS INDEX: 28.17 KG/M2 | HEART RATE: 64 BPM | WEIGHT: 208 LBS | SYSTOLIC BLOOD PRESSURE: 124 MMHG

## 2024-10-25 DIAGNOSIS — I25.10 CAD (CORONARY ARTERY DISEASE): Primary | ICD-10-CM

## 2024-10-25 PROCEDURE — 3074F SYST BP LT 130 MM HG: CPT | Performed by: INTERNAL MEDICINE

## 2024-10-25 PROCEDURE — 4010F ACE/ARB THERAPY RXD/TAKEN: CPT | Performed by: INTERNAL MEDICINE

## 2024-10-25 PROCEDURE — 3078F DIAST BP <80 MM HG: CPT | Performed by: INTERNAL MEDICINE

## 2024-10-25 PROCEDURE — 93005 ELECTROCARDIOGRAM TRACING: CPT | Performed by: INTERNAL MEDICINE

## 2024-10-25 PROCEDURE — 3008F BODY MASS INDEX DOCD: CPT | Performed by: INTERNAL MEDICINE

## 2024-10-25 PROCEDURE — 3044F HG A1C LEVEL LT 7.0%: CPT | Performed by: INTERNAL MEDICINE

## 2024-10-25 PROCEDURE — 99213 OFFICE O/P EST LOW 20 MIN: CPT | Performed by: INTERNAL MEDICINE

## 2024-10-25 PROCEDURE — 93010 ELECTROCARDIOGRAM REPORT: CPT | Performed by: INTERNAL MEDICINE

## 2024-10-25 PROCEDURE — 1157F ADVNC CARE PLAN IN RCRD: CPT | Performed by: INTERNAL MEDICINE

## 2024-10-25 PROCEDURE — 1159F MED LIST DOCD IN RCRD: CPT | Performed by: INTERNAL MEDICINE

## 2024-10-25 PROCEDURE — 1160F RVW MEDS BY RX/DR IN RCRD: CPT | Performed by: INTERNAL MEDICINE

## 2024-10-25 NOTE — PROGRESS NOTES
Counseling:  The patient was counseled regarding diagnostic results, instructions for management, risk factor reductions, prognosis, patient and family education, impressions, risks and benefits of treatment options and importance of compliance with treatment.      Chief Complaint:   The patient presents today for 3-month followup of CAD and postop a-fib.     History Of Present Illness:    Jose Olivia is a 69 year old male patient who presents today for 3-month followup of CAD and postop a-fib. His PMH is significant for HTN, CAD, mild COPD, DM with diabetic peripheral neuropathy, hyperlipidemia, mild renal insufficiency and CVA 2018 (on aspirin and Plavix since that time). Over the past 3 months, the patient states that he has done well from a cardiac standpoint. He denies any CP, chest discomfort, SOB or palpitations. BP has been stable. EKG today shows NSR with no acute changes. The patient is compliant with his prescribed medications.       Last Recorded Vitals:  Vitals:    10/25/24 1359   BP: 124/62   BP Location: Left arm   Pulse: 64   Weight: 94.3 kg (208 lb)   Height: 1.829 m (6')       Past Surgical History:  He has a past surgical history that includes Prostate surgery (04/05/2016); Knee surgery (04/05/2016); Tonsillectomy (04/05/2016); MR angio neck wo IV contrast (05/18/2022); MR angio head wo IV contrast (05/18/2022); and Appendectomy (07/2024).      Social History:  He reports that he has quit smoking. His smoking use included cigarettes. He has never used smokeless tobacco. He reports current alcohol use. He reports that he does not use drugs.    Family History:  No family history on file.     Allergies:  Patient has no known allergies.    Outpatient Medications:  Current Outpatient Medications   Medication Instructions    apixaban (ELIQUIS) 5 mg, oral, Every 12 hours    ascorbic acid (vitamin C) 1,000 mg, 2 times daily    aspirin 81 mg chewable tablet 1 tablet, Daily    blood sugar diagnostic, disc  "(Breeze 2 Test Strips) strip [place] Breeze 2 Test DISK Quantity: 400 Refills: 0 Start : 24-Oct-2014 Active    cetirizine (ZYRTEC) 10 mg, Daily    cholecalciferol (Vitamin D-3) 50 mcg (2,000 unit) capsule Take by mouth.    cyanocobalamin (Vitamin B-12) 500 mcg tablet Take by mouth.    Easy Touch Insulin Syringe 0.5 mL 31 gauge x 5/16\" syringe     ezetimibe (ZETIA) 10 mg, Daily    insulin NPH (Isophane) (HUMULIN N,NOVOLIN N) 25 Units, 2 times daily before meals    insulin regular (NovoLIN R Regular U100 Insulin) 100 unit/mL injection injectable 3 times a day (before meals)Sliding scale coverage    isosorbide mononitrate ER (IMDUR) 30 mg, oral, Daily    losartan (Cozaar) 50 mg tablet 1 tablet, Daily (0630)    multivitamin tablet Take by mouth.    OneTouch Verio test strips strip use to take blood sugars at least 3-4 times per day    Ozempic 0.25 mg or 0.5 mg (2 mg/3 mL) pen injector     sildenafil (REVATIO) 20 mg    Stiolto Respimat 2.5-2.5 mcg/actuation mist inhaler 2 puffs inhaled once a day    tamsulosin (FLOMAX) 0.4 mg, oral, Daily, Daily before bed    vitamin B complex tablet 1 tablet, Daily     Review of Systems   All other systems reviewed and are negative.     Physical Exam:  Constitutional:       Appearance: Healthy appearance. Not in distress.   Neck:      Vascular: No JVR. JVD normal.   Pulmonary:      Effort: Pulmonary effort is normal.      Breath sounds: Normal breath sounds. No wheezing. No rhonchi. No rales.   Chest:      Chest wall: Not tender to palpatation.   Cardiovascular:      PMI at left midclavicular line. Normal rate. Regular rhythm. Normal S1. Normal S2.       Murmurs: There is no murmur.      No gallop.  No click. No rub.   Pulses:     Intact distal pulses.   Edema:     Peripheral edema absent.   Abdominal:      General: Bowel sounds are normal.      Palpations: Abdomen is soft.      Tenderness: There is no abdominal tenderness.   Musculoskeletal: Normal range of motion.         General: No " tenderness. Skin:     General: Skin is warm and dry.   Neurological:      General: No focal deficit present.      Mental Status: Alert and oriented to person, place and time.          Last Labs:  CBC -  Lab Results   Component Value Date    WBC 10.6 07/15/2024    HGB 14.1 07/15/2024    HCT 40.7 (L) 07/15/2024    MCV 95 07/15/2024     07/15/2024       CMP -  Lab Results   Component Value Date    CALCIUM 8.9 07/15/2024    PHOS 4.1 11/16/2019    PROT 6.4 07/07/2024    ALBUMIN 3.8 07/07/2024    AST 14 07/07/2024    ALT 19 07/07/2024    ALKPHOS 68 07/07/2024    BILITOT 1.3 (H) 07/07/2024       LIPID PANEL -   Lab Results   Component Value Date    CHOL 150 05/19/2022    TRIG 204 (H) 05/19/2022    HDL 40.1 05/19/2022    CHHDL 3.7 05/19/2022    LDLF 69 05/19/2022    VLDL 41 (H) 05/19/2022    NHDL 110 05/19/2022       RENAL FUNCTION PANEL -   Lab Results   Component Value Date    GLUCOSE 135 (H) 07/15/2024     07/15/2024    K 3.7 07/15/2024     (H) 07/15/2024    CO2 22 07/15/2024    ANIONGAP 12 07/15/2024    BUN 21 07/15/2024    CREATININE 1.07 07/15/2024    GFRMALE 61 05/20/2022    CALCIUM 8.9 07/15/2024    PHOS 4.1 11/16/2019    ALBUMIN 3.8 07/07/2024        Lab Results   Component Value Date     (H) 05/19/2022    HGBA1C 6.9 (H) 07/07/2024       Last Cardiology Tests:  05/20/2022 - TTE  1. The left ventricular systolic function is normal with a 55-60% estimated ejection fraction.  2. Spectral Doppler shows a pseudonormal pattern of left ventricular diastolic filling.  3. Aortic valve stenosis is not present.     12/23/2021 - Vascular Lab PVR ANGLE Only   1. Bilateral Lower PVR: No evidence of arterial occlusive disease bilaterally in the lower extremities at rest.  2. Right Lower PVR: Normal digital perfusion noted. Biphasic flow is noted in the right posterior tibial artery and right dorsalis pedis artery. Triphasic flow is noted in the right common femoral artery.  3. Left Lower PVR: Normal  digital perfusion noted. Biphasic flow is noted in the left posterior tibial artery. Triphasic flow is noted in the left common femoral artery and left dorsalis pedis artery.     12/20/2021 - Cardiac Catheterization (LH)  1. Double vessel coronary artery disease without proximal left anterior descending involvement.  2. Left Ventricular end-diastolic pressure = 9.  3. Normal LV filling pressures.     11/23/2021 - Vascular Lab Carotid Artery Duplex U/S   1. Right Carotid: Findings are consistent with less than 50% stenosis of the right proximal ICA. Laminar flow seen by color Doppler. Right external carotid artery appears patent with no evidence of stenosis. No evidence of hemodynamically significant stenosis of the right common carotid artery. The right vertebral artery is patent with antegrade flow. No evidence of hemodynamically significant stenosis in the right subclavian.  2. Left Carotid: Findings are consistent with less than 50% stenosis of the left proximal ICA. Laminar flow seen by color Doppler. Left external carotid artery appears patent with no evidence of stenosis. No evidence of hemodynamically significant stenosis of the left common carotid artery. The left vertebral artery is patent with antegrade flow. No evidence of hemodynamically significant stenosis in the left subclavian.     11/04/2021 - TTE  1. The left ventricular systolic function is normal with a 60-65% estimated ejection fraction.  2. There is moderate concentric left ventricular hypertrophy.     11/04/2021 - Exercise Stress Test  1. Stress test was indeterminate / nondiagnostic due to baseline EKG abnormalities.  2. Consideration for alternative testing (stress test with imaging component, CTA coronaries, or coronary angiography) can be considered if clinically indicated.  3. Dr. Treadwell's office was made aware of the result and recommendations as outlined above.  4. Hypertensive blood pressure response to exercise.  5. Indeterminate  Stress Test.  6. The adequate level of stress was achieved.     06/10/2019 - PFTs  Minimal airway obstruction is present.     Lab review: I have personally reviewed the laboratory result(s).    Assessment/Plan   1) Moderate Diffuse CAD of RCA and LAD  On ASA 81 mg daily, Imdur 30 mg daily, losartan 50 mg daily  Plavix previously discontinued as patient was prescribed Eliquis for postop a-fib  If recurrent symptoms on medical Rx, then PCI of RCA  Home monitoring of BP  TTE 07/11/2024 with LVEF 60-65%, normal RV systolic function   Lipid panel 09/26/2024 with LDL of 110 - patient has been off statins and Zetia for some time   Denies CP, chest discomfort or SOB  BP stable  Continue current medical Rx   Advised to have repeat lipids in 3 months through PCP - if LDL persistently elevated, consider restarting statin and Zetia.  Followup with Maribel Burgos CNP, in 6 months    2) Postoperative A-Fib  On Eliquis 5 mg daily   Hospital admission 07/2024 with rupture of appendix s/p appendectomy  Found to be in a-fib on monitor postoperatively  Discharged home on rate control therapy and anticoagulation with plan for outpatient DCC  Seen by Maribel Burgos CNP, 07/25/2024 - spontaneously converted to SR so DCC not required  Denies palpitations  In NSR  Continue current medical Rx   Followup with Maribel Burgos CNP, in 6 months    3) Claudication Symptoms  Negative LE ANGLE in the past     4) Fatigue/Tiredness, Decreased Energy, Poor Sleep Quality  Pt reports these symptoms have been present x10 years  Inconclusive in-lab sleep study in past d/t inability to fall asleep in lab setting  Previously referred to Dr. Castro for assessment of sleep apnea       Scribe Attestation  By signing my name below, I, Reena Macias   attest that this documentation has been prepared under the direction and in the presence of Diogo Murdock MD.

## 2024-10-25 NOTE — LETTER
October 25, 2024     López Neil DO  [address]  [address]  [place] [address]    Patient: Jose Olivia   YOB: 1955   Date of Visit: 10/25/2024       Dear Dr. López Neil, :    Thank you for referring Jose Olivia to me for evaluation. Below are my notes for this consultation.  If you have questions, please do not hesitate to call me. I look forward to following your patient along with you.       Sincerely,     Diogo Murdock MD      CC: No Recipients  ______________________________________________________________________________________    Counseling:  The patient was counseled regarding diagnostic results, instructions for management, risk factor reductions, prognosis, patient and family education, impressions, risks and benefits of treatment options and importance of compliance with treatment.      Chief Complaint:   The patient presents today for 3-month followup of CAD and postop a-fib.     History Of Present Illness:    Jose Olivia is a 69 year old male patient who presents today for 3-month followup of CAD and postop a-fib. His PMH is significant for HTN, CAD, mild COPD, DM with diabetic peripheral neuropathy, hyperlipidemia, mild renal insufficiency and CVA 2018 (on aspirin and Plavix since that time). Over the past 3 months, the patient states that he has done well from a cardiac standpoint. He denies any CP, chest discomfort, SOB or palpitations. BP has been stable. EKG today shows NSR with no acute changes. The patient is compliant with his prescribed medications.       Last Recorded Vitals:  Vitals:    10/25/24 1359   BP: 124/62   BP Location: Left arm   Pulse: 64   Weight: 94.3 kg (208 lb)   Height: 1.829 m (6')       Past Surgical History:  He has a past surgical history that includes Prostate surgery (04/05/2016); Knee surgery (04/05/2016); Tonsillectomy (04/05/2016); MR angio neck wo IV contrast (05/18/2022); MR angio head wo IV contrast (05/18/2022); and  "Appendectomy (07/2024).      Social History:  He reports that he has quit smoking. His smoking use included cigarettes. He has never used smokeless tobacco. He reports current alcohol use. He reports that he does not use drugs.    Family History:  No family history on file.     Allergies:  Patient has no known allergies.    Outpatient Medications:  Current Outpatient Medications   Medication Instructions   • apixaban (ELIQUIS) 5 mg, oral, Every 12 hours   • ascorbic acid (vitamin C) 1,000 mg, 2 times daily   • aspirin 81 mg chewable tablet 1 tablet, Daily   • blood sugar diagnostic, disc (Breeze 2 Test Strips) strip Dilma Breeze 2 Test DISK Quantity: 400 Refills: 0 Start : 24-Oct-2014 Active   • cetirizine (ZYRTEC) 10 mg, Daily   • cholecalciferol (Vitamin D-3) 50 mcg (2,000 unit) capsule Take by mouth.   • cyanocobalamin (Vitamin B-12) 500 mcg tablet Take by mouth.   • Easy Touch Insulin Syringe 0.5 mL 31 gauge x 5/16\" syringe    • ezetimibe (ZETIA) 10 mg, Daily   • insulin NPH (Isophane) (HUMULIN N,NOVOLIN N) 25 Units, 2 times daily before meals   • insulin regular (NovoLIN R Regular U100 Insulin) 100 unit/mL injection injectable 3 times a day (before meals)Sliding scale coverage   • isosorbide mononitrate ER (IMDUR) 30 mg, oral, Daily   • losartan (Cozaar) 50 mg tablet 1 tablet, Daily (0630)   • multivitamin tablet Take by mouth.   • OneTouch Verio test strips strip use to take blood sugars at least 3-4 times per day   • Ozempic 0.25 mg or 0.5 mg (2 mg/3 mL) pen injector    • sildenafil (REVATIO) 20 mg   • Stiolto Respimat 2.5-2.5 mcg/actuation mist inhaler 2 puffs inhaled once a day   • tamsulosin (FLOMAX) 0.4 mg, oral, Daily, Daily before bed   • vitamin B complex tablet 1 tablet, Daily     Review of Systems   All other systems reviewed and are negative.     Physical Exam:  Constitutional:       Appearance: Healthy appearance. Not in distress.   Neck:      Vascular: No JVR. JVD normal.   Pulmonary:      Effort: " Pulmonary effort is normal.      Breath sounds: Normal breath sounds. No wheezing. No rhonchi. No rales.   Chest:      Chest wall: Not tender to palpatation.   Cardiovascular:      PMI at left midclavicular line. Normal rate. Regular rhythm. Normal S1. Normal S2.       Murmurs: There is no murmur.      No gallop.  No click. No rub.   Pulses:     Intact distal pulses.   Edema:     Peripheral edema absent.   Abdominal:      General: Bowel sounds are normal.      Palpations: Abdomen is soft.      Tenderness: There is no abdominal tenderness.   Musculoskeletal: Normal range of motion.         General: No tenderness. Skin:     General: Skin is warm and dry.   Neurological:      General: No focal deficit present.      Mental Status: Alert and oriented to person, place and time.          Last Labs:  CBC -  Lab Results   Component Value Date    WBC 10.6 07/15/2024    HGB 14.1 07/15/2024    HCT 40.7 (L) 07/15/2024    MCV 95 07/15/2024     07/15/2024       CMP -  Lab Results   Component Value Date    CALCIUM 8.9 07/15/2024    PHOS 4.1 11/16/2019    PROT 6.4 07/07/2024    ALBUMIN 3.8 07/07/2024    AST 14 07/07/2024    ALT 19 07/07/2024    ALKPHOS 68 07/07/2024    BILITOT 1.3 (H) 07/07/2024       LIPID PANEL -   Lab Results   Component Value Date    CHOL 150 05/19/2022    TRIG 204 (H) 05/19/2022    HDL 40.1 05/19/2022    CHHDL 3.7 05/19/2022    LDLF 69 05/19/2022    VLDL 41 (H) 05/19/2022    NHDL 110 05/19/2022       RENAL FUNCTION PANEL -   Lab Results   Component Value Date    GLUCOSE 135 (H) 07/15/2024     07/15/2024    K 3.7 07/15/2024     (H) 07/15/2024    CO2 22 07/15/2024    ANIONGAP 12 07/15/2024    BUN 21 07/15/2024    CREATININE 1.07 07/15/2024    GFRMALE 61 05/20/2022    CALCIUM 8.9 07/15/2024    PHOS 4.1 11/16/2019    ALBUMIN 3.8 07/07/2024        Lab Results   Component Value Date     (H) 05/19/2022    HGBA1C 6.9 (H) 07/07/2024       Last Cardiology Tests:  05/20/2022 - TTE  1. The left  ventricular systolic function is normal with a 55-60% estimated ejection fraction.  2. Spectral Doppler shows a pseudonormal pattern of left ventricular diastolic filling.  3. Aortic valve stenosis is not present.     12/23/2021 - Vascular Lab PVR ANGLE Only   1. Bilateral Lower PVR: No evidence of arterial occlusive disease bilaterally in the lower extremities at rest.  2. Right Lower PVR: Normal digital perfusion noted. Biphasic flow is noted in the right posterior tibial artery and right dorsalis pedis artery. Triphasic flow is noted in the right common femoral artery.  3. Left Lower PVR: Normal digital perfusion noted. Biphasic flow is noted in the left posterior tibial artery. Triphasic flow is noted in the left common femoral artery and left dorsalis pedis artery.     12/20/2021 - Cardiac Catheterization (LH)  1. Double vessel coronary artery disease without proximal left anterior descending involvement.  2. Left Ventricular end-diastolic pressure = 9.  3. Normal LV filling pressures.     11/23/2021 - Vascular Lab Carotid Artery Duplex U/S   1. Right Carotid: Findings are consistent with less than 50% stenosis of the right proximal ICA. Laminar flow seen by color Doppler. Right external carotid artery appears patent with no evidence of stenosis. No evidence of hemodynamically significant stenosis of the right common carotid artery. The right vertebral artery is patent with antegrade flow. No evidence of hemodynamically significant stenosis in the right subclavian.  2. Left Carotid: Findings are consistent with less than 50% stenosis of the left proximal ICA. Laminar flow seen by color Doppler. Left external carotid artery appears patent with no evidence of stenosis. No evidence of hemodynamically significant stenosis of the left common carotid artery. The left vertebral artery is patent with antegrade flow. No evidence of hemodynamically significant stenosis in the left subclavian.     11/04/2021 - TTE  1. The  left ventricular systolic function is normal with a 60-65% estimated ejection fraction.  2. There is moderate concentric left ventricular hypertrophy.     11/04/2021 - Exercise Stress Test  1. Stress test was indeterminate / nondiagnostic due to baseline EKG abnormalities.  2. Consideration for alternative testing (stress test with imaging component, CTA coronaries, or coronary angiography) can be considered if clinically indicated.  3. Dr. Treadwell's office was made aware of the result and recommendations as outlined above.  4. Hypertensive blood pressure response to exercise.  5. Indeterminate Stress Test.  6. The adequate level of stress was achieved.     06/10/2019 - PFTs  Minimal airway obstruction is present.     Lab review: I have personally reviewed the laboratory result(s).    Assessment/Plan  1) Moderate Diffuse CAD of RCA and LAD  On ASA 81 mg daily, Imdur 30 mg daily, losartan 50 mg daily  Plavix previously discontinued as patient was prescribed Eliquis for postop a-fib  If recurrent symptoms on medical Rx, then PCI of RCA  Home monitoring of BP  TTE 07/11/2024 with LVEF 60-65%, normal RV systolic function   Lipid panel 09/26/2024 with LDL of 110 - patient has been off statins and Zetia for some time   Denies CP, chest discomfort or SOB  BP stable  Continue current medical Rx   Advised to have repeat lipids in 3 months through PCP - if LDL persistently elevated, consider restarting statin and Zetia.  Followup with Maribel Burgos CNP, in 6 months    2) Postoperative A-Fib  On Eliquis 5 mg daily   Hospital admission 07/2024 with rupture of appendix s/p appendectomy  Found to be in a-fib on monitor postoperatively  Discharged home on rate control therapy and anticoagulation with plan for outpatient DCC  Seen by Maribel Burgos CNP, 07/25/2024 - spontaneously converted to SR so DCC not required  Denies palpitations  In NSR  Continue current medical Rx   Followup with Maribel Burgos CNP, in 6 months    3)  Claudication Symptoms  Negative LE ANGLE in the past     4) Fatigue/Tiredness, Decreased Energy, Poor Sleep Quality  Pt reports these symptoms have been present x10 years  Inconclusive in-lab sleep study in past d/t inability to fall asleep in lab setting  Previously referred to Dr. Castro for assessment of sleep apnea       Scribe Attestation  By signing my name below, I, Reena Macias   attest that this documentation has been prepared under the direction and in the presence of Doigo Murdock MD.

## 2024-10-25 NOTE — PATIENT INSTRUCTIONS
"Continue all current medications as prescribed.  Dr. Murdock has recommended that you have repeat blood work drawn in 3 months through your primary care provider to followup on your cholesterol levels. Your \"bad\" cholesterol is currently elevated at 110. If your LDL remains elevated, you should consider going back on your statin and Zetia.   Followup with Maribel Burgos CNP, in 6 months.      If you have any questions or cardiac concerns, please call our office at [phone].   "

## 2024-12-04 ENCOUNTER — TELEPHONE (OUTPATIENT)
Dept: CARDIOLOGY | Facility: HOSPITAL | Age: 69
End: 2024-12-04
Payer: MEDICARE

## 2024-12-04 DIAGNOSIS — I48.91 ATRIAL FIBRILLATION, UNSPECIFIED TYPE (MULTI): ICD-10-CM

## 2024-12-04 NOTE — TELEPHONE ENCOUNTER
----- Message from Nurse Natalie EDWARDS sent at 12/2/2024  5:16 PM EST -----  Regarding: Refill  Patient is requesting a refill of Eliquis. Patient is requesting a 90 day supply with 1 refill to get him to his appointment in April to be sent to [facility] in [place].

## 2025-04-18 ASSESSMENT — ENCOUNTER SYMPTOMS
GASTROINTESTINAL NEGATIVE: 1
FALLS: 0
DYSPNEA ON EXERTION: 1
IRREGULAR HEARTBEAT: 0
ORTHOPNEA: 0
SHORTNESS OF BREATH: 0
RESPIRATORY NEGATIVE: 1
DEPRESSION: 0
SYNCOPE: 0
MEMORY LOSS: 0
DECREASED APPETITE: 0
PALPITATIONS: 0
BLURRED VISION: 0
COUGH: 0
LIGHT-HEADEDNESS: 0
FOCAL WEAKNESS: 0

## 2025-04-18 NOTE — PROGRESS NOTES
Chief Complaint/Reason for Visit:   Patient is coming in today as a 6 month Cardiovascular follow up.     History Of Present Illness:    Mr. Olivia is coming in today as a 6-month cardiovascular follow-up.  We have followed this patient previously for hypertension, dyslipidemia, postoperative atrial fibrillation, and coronary artery disease.  His other medical history includes COPD, diabetes, mild renal insufficiency, stroke in 2018, and peripheral neuropathy.    Patient comes in today generally feeling well.  He has some chronic fatigue which is at baseline.  He has dyspnea on exertion with moderate activity.  He denies any chest pain, pressure, palpitations, or orthopnea.  At times he has some mild left lower extremity edema.  Patient reports he is taking his medication as prescribed.  He self discontinued statins and Zetia a while ago because he thinks he does not need it.  He no longer smokes but admits to drinking alcohol on a routine basis.    Past Medical History:  He has a past medical history of Diabetes mellitus (Multi), Other conditions influencing health status, Personal history of other diseases of the circulatory system (03/08/2017), Personal history of other diseases of the respiratory system (03/08/2017), and Personal history of other endocrine, nutritional and metabolic disease (03/08/2017).    Past Surgical History:  He has a past surgical history that includes Prostate surgery (04/05/2016); Knee surgery (04/05/2016); Tonsillectomy (04/05/2016); MR angio neck wo IV contrast (05/18/2022); MR angio head wo IV contrast (05/18/2022); and Appendectomy (07/2024).      Social History:  He reports that he has quit smoking. His smoking use included cigarettes. He has never used smokeless tobacco. He reports current alcohol use. He reports that he does not use drugs.    Family History:  No family history on file.     Allergies:  Patient has no known allergies.    Medications:  Current Outpatient Medications  "  Medication Instructions    apixaban (ELIQUIS) 5 mg, oral, Every 12 hours    ascorbic acid (vitamin C) 1,000 mg, 2 times daily    aspirin 81 mg chewable tablet 1 tablet, Daily    cetirizine (ZYRTEC) 10 mg, Daily    cholecalciferol (Vitamin D-3) 50 mcg (2,000 unit) capsule Take by mouth.    Easy Touch Insulin Syringe 0.5 mL 31 gauge x 5/16\" syringe     insulin NPH (Isophane) (HUMULIN N,NOVOLIN N) 25 Units, 2 times daily before meals    insulin regular (NovoLIN R Regular U100 Insulin) 100 unit/mL injection injectable 3 times a day (before meals)Sliding scale coverage    isosorbide mononitrate ER (IMDUR) 30 mg, oral, Daily    losartan (Cozaar) 50 mg tablet 1 tablet, Daily (0630)    multivitamin tablet Take by mouth.    OneTouch Verio test strips strip use to take blood sugars at least 3-4 times per day    sildenafil (REVATIO) 20 mg    Stiolto Respimat 2.5-2.5 mcg/actuation mist inhaler 2 puffs inhaled once a day    vitamin B complex tablet 1 tablet, Daily       Review of Systems:  Review of Systems   Constitutional: Positive for malaise/fatigue. Negative for decreased appetite.   HENT: Negative.     Eyes:  Negative for blurred vision and visual disturbance.   Cardiovascular:  Positive for dyspnea on exertion (with moderate activity). Negative for chest pain, irregular heartbeat, leg swelling (LLE edema at times), orthopnea, palpitations and syncope.   Respiratory: Negative.  Negative for cough and shortness of breath.    Musculoskeletal:  Negative for arthritis and falls.        Feels off balance.   Gastrointestinal: Negative.    Neurological:  Negative for focal weakness and light-headedness.        Balance issues   Psychiatric/Behavioral:  Negative for depression and memory loss.         Vitals  Visit Vitals  /60 (BP Location: Right arm, Patient Position: Sitting, BP Cuff Size: Adult)   Pulse 85   Ht 1.829 m (6')   Wt 93.9 kg (207 lb)   SpO2 96%   BMI 28.07 kg/m²   Smoking Status Former   BSA 2.18 m² "   '    Physical Exam:  Physical Exam  Constitutional:       Appearance: Normal appearance.   HENT:      Head: Normocephalic.   Eyes:      Conjunctiva/sclera: Conjunctivae normal.   Cardiovascular:      Rate and Rhythm: Normal rate and regular rhythm.      Pulses: Normal pulses.      Heart sounds: S1 normal and S2 normal. No murmur heard.     No friction rub. No gallop.   Pulmonary:      Effort: Pulmonary effort is normal.      Breath sounds: Normal breath sounds.   Abdominal:      General: Bowel sounds are normal.      Palpations: Abdomen is soft.      Tenderness: There is no abdominal tenderness.   Musculoskeletal:      Cervical back: Neck supple.      Right lower leg: No edema.      Left lower leg: Edema (mild LLE edema) present.   Skin:     General: Skin is warm and dry.   Neurological:      General: No focal deficit present.      Mental Status: He is alert and oriented to person, place, and time.   Psychiatric:         Attention and Perception: Attention normal.         Mood and Affect: Mood normal.           Last Labs:  CBC -  Lab Results   Component Value Date    WBC 10.6 07/15/2024    HGB 14.1 07/15/2024    HCT 40.7 (L) 07/15/2024    MCV 95 07/15/2024     07/15/2024     Lab Results   Component Value Date    GLUCOSE 135 (H) 07/15/2024    CALCIUM 8.9 07/15/2024     07/15/2024    K 3.7 07/15/2024    CO2 22 07/15/2024     (H) 07/15/2024    BUN 21 07/15/2024    CREATININE 1.07 07/15/2024      CMP -  Lab Results   Component Value Date    CALCIUM 8.9 07/15/2024    PHOS 4.1 11/16/2019    PROT 6.4 07/07/2024    ALBUMIN 3.8 07/07/2024    AST 14 07/07/2024    ALT 19 07/07/2024    ALKPHOS 68 07/07/2024    BILITOT 1.3 (H) 07/07/2024       LIPID PANEL -   Lab Results   Component Value Date    CHOL 150 05/19/2022    TRIG 204 (H) 05/19/2022    HDL 40.1 05/19/2022    CHHDL 3.7 05/19/2022    LDLF 69 05/19/2022    VLDL 41 (H) 05/19/2022    NHDL 110 05/19/2022       Lab Results   Component Value Date    BNP  199 (H) 05/19/2022    HGBA1C 6.9 (H) 07/07/2024     Laboratory studies done April 14, 2025 (outside lab):  normal electrolytes, creatinine 1.31.  Hemoglobin A1c 7.4.  Lipid labs with triglycerides 143, .  H&H 17.2/51.3.    Last Cardiology Tests:    Echo:7-11-24  CONCLUSIONS:   1. The left ventricular systolic function is normal, with a visually estimated ejection fraction of 60-65%.   2. There is normal right ventricular global systolic function.     Cath:12-20-21  CONCLUSIONS:   1. Double vessel coronary artery disease without proximal left anterior descending involvement.   2. Left Ventricular end-diastolic pressure = 9.   3. Normal LV filling pressures.         Assessment/Plan:    Coronary artery disease: Patient has moderate, diffuse coronary artery disease primarily in the RCA and LAD.  Medical therapy is recommended.  He will continue on a low-dose aspirin, Imdur, and losartan.  He ideally should be on a statin if willing to take.  We would like to see his LDL less than 70.    Hypertension: Blood pressure today is well-controlled at 124/60.  He will continue on losartan 50 mg daily and Imdur 30 mg daily.  He was encouraged to be on a heart healthy low-sodium diet.    Atrial fibrillation: Patient is regular today on exam.  He is not noticing any tachycardia or palpitations.  Patient has an elevated [id number] score requiring anticoagulation.  He is not having any abnormal bleeding or bruising and will continue on Eliquis 5 mg twice daily.  Patient requests a prescription renewal today.    Dyslipidemia: Lipid labs recently done at his PCP office showed triglycerides 143, .  Ideally, we would like to see the patient's LDL less than 70.  I am recommending starting on a statin. Sent in Crestor 10 mg daily     Patient was counseled on the importance of limiting his alcohol intake.  He will follow-up with Dr. Murdock in 6 months.  Patient instructed to call with any cardiovascular complaints. All  questions were answered.       Dragon dictation was utilized to create this document. Quite often unanticipated grammatical, syntax,  and other interpretive errors are inadvertently transcribed by the computer software.  Please disregard these errors.  Please excuse any errors that have escaped final proofreading.             Maribel Burgos, GABRIEL-CNP

## 2025-04-21 ENCOUNTER — APPOINTMENT (OUTPATIENT)
Dept: CARDIOLOGY | Facility: CLINIC | Age: 70
End: 2025-04-21
Payer: MEDICARE

## 2025-04-24 ENCOUNTER — OFFICE VISIT (OUTPATIENT)
Dept: CARDIOLOGY | Facility: HOSPITAL | Age: 70
End: 2025-04-24
Payer: MEDICARE

## 2025-04-24 VITALS
HEART RATE: 85 BPM | SYSTOLIC BLOOD PRESSURE: 124 MMHG | HEIGHT: 72 IN | OXYGEN SATURATION: 96 % | BODY MASS INDEX: 28.04 KG/M2 | WEIGHT: 207 LBS | DIASTOLIC BLOOD PRESSURE: 60 MMHG

## 2025-04-24 DIAGNOSIS — I48.0 PAROXYSMAL ATRIAL FIBRILLATION (MULTI): ICD-10-CM

## 2025-04-24 DIAGNOSIS — I25.10 CORONARY ARTERY DISEASE INVOLVING NATIVE CORONARY ARTERY OF NATIVE HEART, UNSPECIFIED WHETHER ANGINA PRESENT: ICD-10-CM

## 2025-04-24 DIAGNOSIS — E78.5 DYSLIPIDEMIA: Primary | ICD-10-CM

## 2025-04-24 DIAGNOSIS — I10 ESSENTIAL HYPERTENSION: ICD-10-CM

## 2025-04-24 DIAGNOSIS — I48.91 ATRIAL FIBRILLATION, UNSPECIFIED TYPE (MULTI): ICD-10-CM

## 2025-04-24 PROCEDURE — 3074F SYST BP LT 130 MM HG: CPT | Performed by: NURSE PRACTITIONER

## 2025-04-24 PROCEDURE — 99214 OFFICE O/P EST MOD 30 MIN: CPT | Performed by: NURSE PRACTITIONER

## 2025-04-24 PROCEDURE — 4010F ACE/ARB THERAPY RXD/TAKEN: CPT | Performed by: NURSE PRACTITIONER

## 2025-04-24 PROCEDURE — 3008F BODY MASS INDEX DOCD: CPT | Performed by: NURSE PRACTITIONER

## 2025-04-24 PROCEDURE — 1036F TOBACCO NON-USER: CPT | Performed by: NURSE PRACTITIONER

## 2025-04-24 PROCEDURE — 1157F ADVNC CARE PLAN IN RCRD: CPT | Performed by: NURSE PRACTITIONER

## 2025-04-24 PROCEDURE — 1159F MED LIST DOCD IN RCRD: CPT | Performed by: NURSE PRACTITIONER

## 2025-04-24 PROCEDURE — 3078F DIAST BP <80 MM HG: CPT | Performed by: NURSE PRACTITIONER

## 2025-04-24 PROCEDURE — 1160F RVW MEDS BY RX/DR IN RCRD: CPT | Performed by: NURSE PRACTITIONER

## 2025-04-24 RX ORDER — ISOSORBIDE MONONITRATE 30 MG/1
30 TABLET, EXTENDED RELEASE ORAL DAILY
Qty: 90 TABLET | Refills: 3 | Status: SHIPPED | OUTPATIENT
Start: 2025-04-24 | End: 2026-04-24

## 2025-04-24 RX ORDER — ROSUVASTATIN CALCIUM 10 MG/1
10 TABLET, COATED ORAL DAILY
Qty: 30 TABLET | Refills: 11 | Status: SHIPPED | OUTPATIENT
Start: 2025-04-24 | End: 2026-04-24

## 2025-04-24 NOTE — PATIENT INSTRUCTIONS
Continue on current meds  Added Crestor 10mg daily   Heart healthy, low sodium diet  Mediterranean diet is recommended  Please bring copy of labs to your next office visit  Alcohol in moderation only   Follow up with Dr Murdock in 6 months

## 2025-04-28 ENCOUNTER — APPOINTMENT (OUTPATIENT)
Dept: CARDIOLOGY | Facility: HOSPITAL | Age: 70
End: 2025-04-28
Payer: MEDICARE

## 2025-07-24 DIAGNOSIS — E78.5 DYSLIPIDEMIA: ICD-10-CM

## 2025-10-27 ENCOUNTER — APPOINTMENT (OUTPATIENT)
Dept: NEUROLOGY | Facility: HOSPITAL | Age: 70
End: 2025-10-27
Payer: MEDICARE

## (undated) DEVICE — Device

## (undated) DEVICE — SUTURE, VICRYL, 4-0, 18 IN, UNDYED BR PS-2

## (undated) DEVICE — CAUTERY, PENCIL, PUSH BUTTON, SMOKE EVAC, 70MM

## (undated) DEVICE — GLOVE, PROTEXIS PI CLASSIC, SZ-7.5, PF, LF

## (undated) DEVICE — SUTURE, VICRYL, 2-0, 27 IN, SH, UNDYED

## (undated) DEVICE — DRESSING, GAUZE, SPONGE, 8 PLY, CURITY, 2 X 2 IN, STERILE

## (undated) DEVICE — PREP, SCRUB, SKIN, FOAM, HIBICLENS, 4 OZ

## (undated) DEVICE — PREP TRAY, SKIN, DRY, W/GLOVES

## (undated) DEVICE — TROCAR, KII OPTICAL BLADELESS 5MM Z THREAD 100MM LNGTH

## (undated) DEVICE — RETRIEVAL SYSTEM, MONARCH, 10MM DISP ENDOSCOPIC

## (undated) DEVICE — ENDO BABCOCKS, 5MM

## (undated) DEVICE — GOWN, ASTOUND, XL

## (undated) DEVICE — SOLUTION, IRRIGATION, SODIUM CHLORIDE 0.9%, 1000 ML, POUR BOTTLE

## (undated) DEVICE — SUTURE, VICRYL, 0, 27 IN, UR-6, VIOLET

## (undated) DEVICE — TRAY, SURESTEP, URINE METER, 16FR, COMPLETE, W/STATLOCK

## (undated) DEVICE — KIT, LAP ACCESS, TOWEL AND SWAB

## (undated) DEVICE — CUTTER, LINEAR FLEX ARTICNG 45MM, NO LOAD

## (undated) DEVICE — TROCAR, BLUNT TIP, KII, W/SEAL, 12MM X 100MM

## (undated) DEVICE — RELOAD, LINEAR, 45MM, BLUE, REG TISSUE

## (undated) DEVICE — PAD, GROUNDING, ELECTROSURGICAL, W/9 FT CABLE, POLYHESIVE II, ADULT, LF

## (undated) DEVICE — CLIP, ENDO APPLIER LIGAMAX 5MM

## (undated) DEVICE — COVER HANDLE LIGHT, STERIS, BLUE, STERILE

## (undated) DEVICE — SLEEVE, KII, Z-THREAD, 5X100CM